# Patient Record
Sex: FEMALE | Race: WHITE | NOT HISPANIC OR LATINO | Employment: OTHER | URBAN - METROPOLITAN AREA
[De-identification: names, ages, dates, MRNs, and addresses within clinical notes are randomized per-mention and may not be internally consistent; named-entity substitution may affect disease eponyms.]

---

## 2017-10-06 ENCOUNTER — HISTORICAL (OUTPATIENT)
Dept: RADIOLOGY | Facility: HOSPITAL | Age: 67
End: 2017-10-06

## 2017-10-27 ENCOUNTER — HISTORICAL (OUTPATIENT)
Dept: RADIOLOGY | Facility: HOSPITAL | Age: 67
End: 2017-10-27

## 2017-11-13 ENCOUNTER — HISTORICAL (OUTPATIENT)
Dept: RADIOLOGY | Facility: HOSPITAL | Age: 67
End: 2017-11-13

## 2017-11-24 ENCOUNTER — HISTORICAL (OUTPATIENT)
Dept: RADIOLOGY | Facility: HOSPITAL | Age: 67
End: 2017-11-24

## 2017-11-24 LAB
ABS NEUT (OLG): 3.5 X10(3)/MCL (ref 1.5–6.9)
ALBUMIN SERPL-MCNC: 4.1 GM/DL (ref 3.4–5)
ALBUMIN/GLOB SERPL: 1.2 RATIO
ALP SERPL-CCNC: 44 UNIT/L (ref 30–113)
ALT SERPL-CCNC: 32 UNIT/L (ref 10–45)
AST SERPL-CCNC: 19 UNIT/L (ref 15–37)
BILIRUB SERPL-MCNC: 0.3 MG/DL (ref 0.1–0.9)
BILIRUBIN DIRECT+TOT PNL SERPL-MCNC: 0.1 MG/DL (ref 0–0.3)
BILIRUBIN DIRECT+TOT PNL SERPL-MCNC: 0.2 MG/DL
BUN SERPL-MCNC: 15 MG/DL (ref 10–20)
CALCIUM SERPL-MCNC: 9.7 MG/DL (ref 8–10.5)
CHLORIDE SERPL-SCNC: 103 MMOL/L (ref 100–108)
CO2 SERPL-SCNC: 29 MMOL/L (ref 21–35)
CREAT SERPL-MCNC: 0.94 MG/DL (ref 0.7–1.3)
ERYTHROCYTE [DISTWIDTH] IN BLOOD BY AUTOMATED COUNT: 13.1 % (ref 11.5–17)
GLOBULIN SER-MCNC: 3.5 GM/DL
GLUCOSE SERPL-MCNC: 183 MG/DL (ref 75–116)
HCT VFR BLD AUTO: 40 % (ref 36–48)
HGB BLD-MCNC: 12.9 GM/DL (ref 12–16)
MCH RBC QN AUTO: 29 PG (ref 27–34)
MCHC RBC AUTO-ENTMCNC: 32 GM/DL (ref 31–36)
MCV RBC AUTO: 90 FL (ref 80–99)
PLATELET # BLD AUTO: 305 X10(3)/MCL (ref 140–400)
PMV BLD AUTO: 10.3 FL (ref 6.8–10)
POTASSIUM SERPL-SCNC: 4.2 MMOL/L (ref 3.6–5.2)
PROT SERPL-MCNC: 7.6 GM/DL (ref 6.4–8.2)
RBC # BLD AUTO: 4.44 X10(6)/MCL (ref 4.2–5.4)
SODIUM SERPL-SCNC: 139 MMOL/L (ref 135–145)
WBC # SPEC AUTO: 7.3 X10(3)/MCL (ref 4.5–11.5)

## 2018-02-15 ENCOUNTER — HISTORICAL (OUTPATIENT)
Dept: RADIOLOGY | Facility: HOSPITAL | Age: 68
End: 2018-02-15

## 2018-10-09 ENCOUNTER — HISTORICAL (OUTPATIENT)
Dept: RADIOLOGY | Facility: HOSPITAL | Age: 68
End: 2018-10-09

## 2018-10-18 ENCOUNTER — HISTORICAL (OUTPATIENT)
Dept: RADIOLOGY | Facility: HOSPITAL | Age: 68
End: 2018-10-18

## 2018-10-24 LAB
ABS NEUT (OLG): 3.77 X10(3)/MCL (ref 2.1–9.2)
BASOPHILS # BLD AUTO: 0.1 X10(3)/MCL (ref 0–0.2)
BASOPHILS NFR BLD AUTO: 1 %
BUN SERPL-MCNC: 31 MG/DL (ref 7–18)
CALCIUM SERPL-MCNC: 10.2 MG/DL (ref 8.5–10.1)
CHLORIDE SERPL-SCNC: 106 MMOL/L (ref 98–107)
CO2 SERPL-SCNC: 26 MMOL/L (ref 21–32)
CREAT SERPL-MCNC: 1.2 MG/DL (ref 0.55–1.02)
CREAT/UREA NIT SERPL: 25.8
EOSINOPHIL # BLD AUTO: 0.3 X10(3)/MCL (ref 0–0.9)
EOSINOPHIL NFR BLD AUTO: 4 %
ERYTHROCYTE [DISTWIDTH] IN BLOOD BY AUTOMATED COUNT: 12.7 % (ref 11.5–17)
GLUCOSE SERPL-MCNC: 121 MG/DL (ref 74–106)
HCT VFR BLD AUTO: 41 % (ref 37–47)
HGB BLD-MCNC: 12.4 GM/DL (ref 12–16)
LYMPHOCYTES # BLD AUTO: 2.3 X10(3)/MCL (ref 0.6–4.6)
LYMPHOCYTES NFR BLD AUTO: 33 %
MCH RBC QN AUTO: 27.9 PG (ref 27–31)
MCHC RBC AUTO-ENTMCNC: 30.2 GM/DL (ref 33–36)
MCV RBC AUTO: 92.3 FL (ref 80–94)
MONOCYTES # BLD AUTO: 0.6 X10(3)/MCL (ref 0.1–1.3)
MONOCYTES NFR BLD AUTO: 8 %
NEUTROPHILS # BLD AUTO: 3.77 X10(3)/MCL (ref 2.1–9.2)
NEUTROPHILS NFR BLD AUTO: 54 %
PLATELET # BLD AUTO: 325 X10(3)/MCL (ref 130–400)
PMV BLD AUTO: 10.3 FL (ref 9.4–12.4)
POTASSIUM SERPL-SCNC: 4.4 MMOL/L (ref 3.5–5.1)
RBC # BLD AUTO: 4.44 X10(6)/MCL (ref 4.2–5.4)
SODIUM SERPL-SCNC: 141 MMOL/L (ref 136–145)
WBC # SPEC AUTO: 7 X10(3)/MCL (ref 4.5–11.5)

## 2018-10-31 ENCOUNTER — HISTORICAL (OUTPATIENT)
Dept: SURGERY | Facility: HOSPITAL | Age: 68
End: 2018-10-31

## 2018-11-20 ENCOUNTER — HISTORICAL (OUTPATIENT)
Dept: SURGERY | Facility: HOSPITAL | Age: 68
End: 2018-11-20

## 2018-11-20 LAB
APTT PPP: 35.7 SECOND(S) (ref 24.8–36.9)
GROUP & RH: NORMAL
INR PPP: 1.1 (ref 0–1.3)
PROTHROMBIN TIME: 15 SECOND(S) (ref 12.2–14.7)

## 2018-12-03 ENCOUNTER — HISTORICAL (OUTPATIENT)
Dept: ADMINISTRATIVE | Facility: HOSPITAL | Age: 68
End: 2018-12-03

## 2019-01-14 ENCOUNTER — HISTORICAL (OUTPATIENT)
Dept: ADMINISTRATIVE | Facility: HOSPITAL | Age: 69
End: 2019-01-14

## 2019-02-06 ENCOUNTER — HISTORICAL (OUTPATIENT)
Dept: RADIOLOGY | Facility: HOSPITAL | Age: 69
End: 2019-02-06

## 2019-02-26 ENCOUNTER — HISTORICAL (OUTPATIENT)
Dept: RADIOLOGY | Facility: HOSPITAL | Age: 69
End: 2019-02-26

## 2019-03-18 ENCOUNTER — HISTORICAL (OUTPATIENT)
Dept: ADMINISTRATIVE | Facility: HOSPITAL | Age: 69
End: 2019-03-18

## 2019-09-24 ENCOUNTER — HISTORICAL (OUTPATIENT)
Dept: RESPIRATORY THERAPY | Facility: HOSPITAL | Age: 69
End: 2019-09-24

## 2019-09-30 ENCOUNTER — HISTORICAL (OUTPATIENT)
Dept: RADIOLOGY | Facility: HOSPITAL | Age: 69
End: 2019-09-30

## 2019-11-20 ENCOUNTER — HISTORICAL (OUTPATIENT)
Dept: INTENSIVE CARE | Facility: HOSPITAL | Age: 69
End: 2019-11-20

## 2020-01-23 ENCOUNTER — HISTORICAL (OUTPATIENT)
Dept: CARDIOLOGY | Facility: HOSPITAL | Age: 70
End: 2020-01-23

## 2020-05-04 ENCOUNTER — HISTORICAL (OUTPATIENT)
Dept: ADMINISTRATIVE | Facility: HOSPITAL | Age: 70
End: 2020-05-04

## 2020-08-03 ENCOUNTER — HISTORICAL (OUTPATIENT)
Dept: ADMINISTRATIVE | Facility: HOSPITAL | Age: 70
End: 2020-08-03

## 2020-10-26 ENCOUNTER — HISTORICAL (OUTPATIENT)
Dept: SURGERY | Facility: HOSPITAL | Age: 70
End: 2020-10-26

## 2020-10-26 LAB
ABS NEUT (OLG): 4.8 X10(3)/MCL (ref 1.5–6.9)
BUN SERPL-MCNC: 21 MG/DL (ref 9.8–20.1)
CALCIUM SERPL-MCNC: 10.4 MG/DL (ref 8.4–10.2)
CHLORIDE SERPL-SCNC: 104 MMOL/L (ref 98–107)
CO2 SERPL-SCNC: 25 MMOL/L (ref 23–31)
CREAT SERPL-MCNC: 0.82 MG/DL (ref 0.55–1.02)
CREAT/UREA NIT SERPL: 26
ERYTHROCYTE [DISTWIDTH] IN BLOOD BY AUTOMATED COUNT: 13.2 % (ref 11.5–17)
GLUCOSE SERPL-MCNC: 176 MG/DL (ref 82–115)
GROUP & RH: NORMAL
HCT VFR BLD AUTO: 44.9 % (ref 36–48)
HGB BLD-MCNC: 14.6 GM/DL (ref 12–16)
MCH RBC QN AUTO: 28 PG (ref 27–34)
MCHC RBC AUTO-ENTMCNC: 32 GM/DL (ref 31–36)
MCV RBC AUTO: 88 FL (ref 80–99)
PLATELET # BLD AUTO: 297 X10(3)/MCL (ref 140–400)
PMV BLD AUTO: 10.9 FL (ref 6.8–10)
POTASSIUM SERPL-SCNC: 4.3 MMOL/L (ref 3.5–5.1)
RBC # BLD AUTO: 5.13 X10(6)/MCL (ref 4.2–5.4)
SODIUM SERPL-SCNC: 141 MMOL/L (ref 136–145)
WBC # SPEC AUTO: 8.1 X10(3)/MCL (ref 4.5–11.5)

## 2020-11-10 LAB
ABS NEUT (OLG): 3.1 X10(3)/MCL (ref 1.5–6.9)
BUN SERPL-MCNC: 14 MG/DL (ref 9.8–20.1)
CALCIUM SERPL-MCNC: 9.4 MG/DL (ref 8.4–10.2)
CHLORIDE SERPL-SCNC: 104 MMOL/L (ref 98–107)
CO2 SERPL-SCNC: 28 MMOL/L (ref 23–31)
CREAT SERPL-MCNC: 0.74 MG/DL (ref 0.55–1.02)
CREAT/UREA NIT SERPL: 19
ERYTHROCYTE [DISTWIDTH] IN BLOOD BY AUTOMATED COUNT: 13.3 % (ref 11.5–17)
GLUCOSE SERPL-MCNC: 186 MG/DL (ref 82–115)
GROUP & RH: NORMAL
HCT VFR BLD AUTO: 41.9 % (ref 36–48)
HGB BLD-MCNC: 13.1 GM/DL (ref 12–16)
MCH RBC QN AUTO: 28 PG (ref 27–34)
MCHC RBC AUTO-ENTMCNC: 31 GM/DL (ref 31–36)
MCV RBC AUTO: 89 FL (ref 80–99)
PLATELET # BLD AUTO: 289 X10(3)/MCL (ref 140–400)
PMV BLD AUTO: 10.4 FL (ref 6.8–10)
POTASSIUM SERPL-SCNC: 4.3 MMOL/L (ref 3.5–5.1)
RBC # BLD AUTO: 4.72 X10(6)/MCL (ref 4.2–5.4)
SODIUM SERPL-SCNC: 140 MMOL/L (ref 136–145)
WBC # SPEC AUTO: 6.2 X10(3)/MCL (ref 4.5–11.5)

## 2020-11-13 ENCOUNTER — HISTORICAL (OUTPATIENT)
Dept: ANESTHESIOLOGY | Facility: HOSPITAL | Age: 70
End: 2020-11-13

## 2021-02-05 ENCOUNTER — HISTORICAL (OUTPATIENT)
Dept: SURGERY | Facility: HOSPITAL | Age: 71
End: 2021-02-05

## 2021-02-05 LAB
ABS NEUT (OLG): 3 X10(3)/MCL (ref 1.5–6.9)
BUN SERPL-MCNC: 15 MG/DL (ref 9.8–20.1)
CALCIUM SERPL-MCNC: 10.3 MG/DL (ref 8.4–10.2)
CHLORIDE SERPL-SCNC: 99 MMOL/L (ref 98–107)
CO2 SERPL-SCNC: 30 MMOL/L (ref 23–31)
CREAT SERPL-MCNC: 0.83 MG/DL (ref 0.55–1.02)
CREAT/UREA NIT SERPL: 18
ERYTHROCYTE [DISTWIDTH] IN BLOOD BY AUTOMATED COUNT: 13.3 % (ref 11.5–17)
GLUCOSE SERPL-MCNC: 209 MG/DL (ref 82–115)
GROUP & RH: NORMAL
HCT VFR BLD AUTO: 44.8 % (ref 36–48)
HGB BLD-MCNC: 14.2 GM/DL (ref 12–16)
MCH RBC QN AUTO: 27 PG (ref 27–34)
MCHC RBC AUTO-ENTMCNC: 32 GM/DL (ref 31–36)
MCV RBC AUTO: 86 FL (ref 80–99)
PLATELET # BLD AUTO: 256 X10(3)/MCL (ref 140–400)
PMV BLD AUTO: 10.7 FL (ref 6.8–10)
POTASSIUM SERPL-SCNC: 3.7 MMOL/L (ref 3.5–5.1)
RBC # BLD AUTO: 5.18 X10(6)/MCL (ref 4.2–5.4)
SARS-COV-2 AG RESP QL IA.RAPID: NOT DETECTED
SODIUM SERPL-SCNC: 139 MMOL/L (ref 136–145)
WBC # SPEC AUTO: 5.5 X10(3)/MCL (ref 4.5–11.5)

## 2021-02-10 ENCOUNTER — HISTORICAL (OUTPATIENT)
Dept: LAB | Facility: HOSPITAL | Age: 71
End: 2021-02-10

## 2021-02-10 LAB
ABS NEUT (OLG): 3.8 X10(3)/MCL (ref 1.5–6.9)
BUN SERPL-MCNC: 19 MG/DL (ref 9.8–20.1)
CALCIUM SERPL-MCNC: 9.4 MG/DL (ref 8.4–10.2)
CHLORIDE SERPL-SCNC: 102 MMOL/L (ref 98–107)
CO2 SERPL-SCNC: 29 MMOL/L (ref 23–31)
CREAT SERPL-MCNC: 0.74 MG/DL (ref 0.55–1.02)
CREAT/UREA NIT SERPL: 26
ERYTHROCYTE [DISTWIDTH] IN BLOOD BY AUTOMATED COUNT: 13.1 % (ref 11.5–17)
GLUCOSE SERPL-MCNC: 173 MG/DL (ref 82–115)
HCT VFR BLD AUTO: 41.4 % (ref 36–48)
HGB BLD-MCNC: 13.7 GM/DL (ref 12–16)
MCH RBC QN AUTO: 28 PG (ref 27–34)
MCHC RBC AUTO-ENTMCNC: 33 GM/DL (ref 31–36)
MCV RBC AUTO: 86 FL (ref 80–99)
PLATELET # BLD AUTO: 302 X10(3)/MCL (ref 140–400)
PMV BLD AUTO: 10.2 FL (ref 6.8–10)
POTASSIUM SERPL-SCNC: 3.7 MMOL/L (ref 3.5–5.1)
RBC # BLD AUTO: 4.81 X10(6)/MCL (ref 4.2–5.4)
SODIUM SERPL-SCNC: 140 MMOL/L (ref 136–145)
WBC # SPEC AUTO: 6.7 X10(3)/MCL (ref 4.5–11.5)

## 2021-03-04 LAB
ABS NEUT (OLG): 5.18 X10(3)/MCL (ref 2.1–9.2)
ALBUMIN SERPL-MCNC: 2.9 GM/DL (ref 3.4–4.8)
ALBUMIN/GLOB SERPL: 1 RATIO (ref 1.1–2)
ALP SERPL-CCNC: 76 UNIT/L (ref 40–150)
ALT SERPL-CCNC: 59 UNIT/L (ref 0–55)
AST SERPL-CCNC: 11 UNIT/L (ref 5–34)
BASOPHILS # BLD AUTO: 0.06 X10(3)/MCL (ref 0–0.2)
BASOPHILS NFR BLD AUTO: 0.7 % (ref 0–1)
BILIRUB SERPL-MCNC: 0.5 MG/DL (ref 0.2–1.2)
BILIRUBIN DIRECT+TOT PNL SERPL-MCNC: 0.2 MG/DL (ref 0–0.8)
BILIRUBIN DIRECT+TOT PNL SERPL-MCNC: 0.3 MG/DL (ref 0–0.5)
BUN SERPL-MCNC: 20.2 MG/DL (ref 9.8–20.1)
CALCIUM SERPL-MCNC: 9.5 MG/DL (ref 8.4–10.2)
CHLORIDE SERPL-SCNC: 102 MMOL/L (ref 98–107)
CHOLEST SERPL-MCNC: 121 MG/DL
CHOLEST/HDLC SERPL: 5 {RATIO} (ref 0–5)
CO2 SERPL-SCNC: 29 MMOL/L (ref 23–31)
CREAT SERPL-MCNC: 0.84 MG/DL (ref 0.57–1.11)
EOSINOPHIL # BLD AUTO: 0.67 X10(3)/MCL (ref 0–0.9)
EOSINOPHIL NFR BLD AUTO: 8 % (ref 0–6.4)
ERYTHROCYTE [DISTWIDTH] IN BLOOD BY AUTOMATED COUNT: 14.6 % (ref 11.5–17)
EST. AVERAGE GLUCOSE BLD GHB EST-MCNC: 154.2 MG/DL
FERRITIN SERPL-MCNC: 88.27 NG/ML (ref 4.63–204)
GLOBULIN SER-MCNC: 2.9 GM/DL (ref 2.4–3.5)
GLUCOSE SERPL-MCNC: 187 MG/DL (ref 82–115)
HBA1C MFR BLD: 7 %
HCT VFR BLD AUTO: 26 % (ref 37–47)
HDLC SERPL-MCNC: 23 MG/DL (ref 40–60)
HGB BLD-MCNC: 7.9 GM/DL (ref 12–16)
IMM GRANULOCYTES # BLD AUTO: 0.09 10*3/UL (ref 0–0.02)
IMM GRANULOCYTES NFR BLD AUTO: 1.1 % (ref 0–0.43)
IRON SATN MFR SERPL: 12 % (ref 20–50)
IRON SERPL-MCNC: 32 UG/DL (ref 50–170)
LDLC SERPL CALC-MCNC: 50 MG/DL (ref 50–140)
LYMPHOCYTES # BLD AUTO: 1.65 X10(3)/MCL (ref 0.6–4.6)
LYMPHOCYTES NFR BLD AUTO: 19.7 % (ref 16–44)
MAGNESIUM SERPL-MCNC: 1.95 MG/DL (ref 1.6–2.6)
MCH RBC QN AUTO: 27.7 PG (ref 27–31)
MCHC RBC AUTO-ENTMCNC: 30.4 GM/DL (ref 33–36)
MCV RBC AUTO: 91.2 FL (ref 80–94)
MONOCYTES # BLD AUTO: 0.74 X10(3)/MCL (ref 0.1–1.3)
MONOCYTES NFR BLD AUTO: 8.8 % (ref 4–12.1)
NEUTROPHILS # BLD AUTO: 5.18 X10(3)/MCL (ref 2.1–9.2)
NEUTROPHILS NFR BLD AUTO: 61.7 % (ref 43–73)
NRBC BLD AUTO-RTO: 0 % (ref 0–0.2)
PHOSPHATE SERPL-MCNC: 4.6 MG/DL (ref 2.3–4.7)
PLATELET # BLD AUTO: 271 X10(3)/MCL (ref 130–400)
PMV BLD AUTO: 10.6 FL (ref 7.4–10.4)
POTASSIUM SERPL-SCNC: 4 MMOL/L (ref 3.5–5.1)
PREALB SERPL-MCNC: 12.4 MG/DL (ref 14–37)
PROT SERPL-MCNC: 5.8 GM/DL (ref 5.8–7.6)
RBC # BLD AUTO: 2.85 X10(6)/MCL (ref 4.2–5.4)
SODIUM SERPL-SCNC: 138 MMOL/L (ref 136–145)
TIBC SERPL-MCNC: 235 UG/DL (ref 70–310)
TIBC SERPL-MCNC: 267 UG/DL (ref 250–450)
TRANSFERRIN SERPL-MCNC: 224 MG/DL (ref 173–360)
TRIGL SERPL-MCNC: 238 MG/DL (ref 0–150)
TSH SERPL-ACNC: 3.55 UIU/ML (ref 0.35–4.94)
VLDLC SERPL CALC-MCNC: 48 MG/DL
WBC # SPEC AUTO: 8.4 X10(3)/MCL (ref 4.5–11.5)

## 2021-03-05 ENCOUNTER — HISTORICAL (OUTPATIENT)
Dept: ADMINISTRATIVE | Facility: HOSPITAL | Age: 71
End: 2021-03-05

## 2021-03-05 LAB
ABS NEUT (OLG): 6.59 X10(3)/MCL (ref 2.1–9.2)
ALBUMIN SERPL-MCNC: 2.9 GM/DL (ref 3.4–4.8)
ALBUMIN/GLOB SERPL: 0.9 RATIO (ref 1.1–2)
ALP SERPL-CCNC: 75 UNIT/L (ref 40–150)
ALT SERPL-CCNC: 44 UNIT/L (ref 0–55)
AST SERPL-CCNC: 27 UNIT/L (ref 5–34)
BASOPHILS # BLD AUTO: 0.08 X10(3)/MCL (ref 0–0.2)
BASOPHILS NFR BLD AUTO: 0.8 % (ref 0–1)
BILIRUB SERPL-MCNC: 0.5 MG/DL (ref 0.2–1.2)
BILIRUBIN DIRECT+TOT PNL SERPL-MCNC: 0.2 MG/DL (ref 0–0.8)
BILIRUBIN DIRECT+TOT PNL SERPL-MCNC: 0.3 MG/DL (ref 0–0.5)
BUN SERPL-MCNC: 19.5 MG/DL (ref 9.8–20.1)
BUN SERPL-MCNC: 19.6 MG/DL (ref 9.8–20.1)
CALCIUM SERPL-MCNC: 9.4 MG/DL (ref 8.4–10.2)
CALCIUM SERPL-MCNC: 9.6 MG/DL (ref 8.4–10.2)
CHLORIDE SERPL-SCNC: 103 MMOL/L (ref 98–107)
CHLORIDE SERPL-SCNC: 103 MMOL/L (ref 98–107)
CO2 SERPL-SCNC: 23 MMOL/L (ref 23–31)
CO2 SERPL-SCNC: 25 MMOL/L (ref 23–31)
CREAT SERPL-MCNC: 0.8 MG/DL (ref 0.57–1.11)
CREAT SERPL-MCNC: 0.86 MG/DL (ref 0.57–1.11)
CREAT/UREA NIT SERPL: 24
EOSINOPHIL # BLD AUTO: 0.71 X10(3)/MCL (ref 0–0.9)
EOSINOPHIL NFR BLD AUTO: 6.8 % (ref 0–6.4)
ERYTHROCYTE [DISTWIDTH] IN BLOOD BY AUTOMATED COUNT: 14.9 % (ref 11.5–17)
GLOBULIN SER-MCNC: 3.4 GM/DL (ref 2.4–3.5)
GLUCOSE SERPL-MCNC: 167 MG/DL (ref 82–115)
GLUCOSE SERPL-MCNC: 169 MG/DL (ref 82–115)
HCO3 UR-SCNC: 26.4 MMOL/L (ref 22–26)
HCT VFR BLD AUTO: 28.7 % (ref 37–47)
HCT VFR BLD AUTO: 28.9 % (ref 37–47)
HGB BLD-MCNC: 8.8 GM/DL (ref 12–16)
HGB BLD-MCNC: 8.8 GM/DL (ref 12–16)
IMM GRANULOCYTES # BLD AUTO: 0.11 10*3/UL (ref 0–0.02)
IMM GRANULOCYTES NFR BLD AUTO: 1.1 % (ref 0–0.43)
LYMPHOCYTES # BLD AUTO: 2.03 X10(3)/MCL (ref 0.6–4.6)
LYMPHOCYTES NFR BLD AUTO: 19.4 % (ref 16–44)
MAGNESIUM SERPL-MCNC: 1.99 MG/DL (ref 1.6–2.6)
MCH RBC QN AUTO: 28.2 PG (ref 27–31)
MCHC RBC AUTO-ENTMCNC: 30.4 GM/DL (ref 33–36)
MCV RBC AUTO: 92.6 FL (ref 80–94)
MONOCYTES # BLD AUTO: 0.95 X10(3)/MCL (ref 0.1–1.3)
MONOCYTES NFR BLD AUTO: 9.1 % (ref 4–12.1)
NEUTROPHILS # BLD AUTO: 6.59 X10(3)/MCL (ref 2.1–9.2)
NEUTROPHILS NFR BLD AUTO: 62.8 % (ref 43–73)
NRBC BLD AUTO-RTO: 0.2 % (ref 0–0.2)
PCO2 BLDA: 35.9 MMHG (ref 35–45)
PH SMN: 7.47 [PH] (ref 7.35–7.45)
PLATELET # BLD AUTO: 317 X10(3)/MCL (ref 130–400)
PMV BLD AUTO: 11.3 FL (ref 7.4–10.4)
PO2 BLDA: 79 MMHG (ref 80–100)
POC ALLENS TEST: NORMAL
POC BE: 3 MMOL/L (ref -2–3)
POC SAMPLESOURCE: NORMAL
POC SATURATED O2: 96 % (ref 96–97)
POC SITE: NORMAL
POC TCO2: 28 MMOL/L (ref 24–29)
POTASSIUM SERPL-SCNC: 4 MMOL/L (ref 3.5–5.1)
POTASSIUM SERPL-SCNC: 4.4 MMOL/L (ref 3.5–5.1)
PROT SERPL-MCNC: 6.3 GM/DL (ref 5.8–7.6)
RBC # BLD AUTO: 3.12 X10(6)/MCL (ref 4.2–5.4)
SODIUM SERPL-SCNC: 137 MMOL/L (ref 136–145)
SODIUM SERPL-SCNC: 138 MMOL/L (ref 136–145)
WBC # SPEC AUTO: 10.5 X10(3)/MCL (ref 4.5–11.5)

## 2021-03-08 LAB
ABS NEUT (OLG): 6.63 X10(3)/MCL (ref 2.1–9.2)
ALBUMIN SERPL-MCNC: 2.9 GM/DL (ref 3.4–4.8)
ALBUMIN/GLOB SERPL: 0.8 RATIO (ref 1.1–2)
ALP SERPL-CCNC: 68 UNIT/L (ref 40–150)
ALT SERPL-CCNC: 26 UNIT/L (ref 0–55)
AST SERPL-CCNC: 11 UNIT/L (ref 5–34)
BASOPHILS # BLD AUTO: 0.08 X10(3)/MCL (ref 0–0.2)
BASOPHILS NFR BLD AUTO: 0.8 % (ref 0–1)
BILIRUB SERPL-MCNC: 0.4 MG/DL (ref 0.2–1.2)
BILIRUBIN DIRECT+TOT PNL SERPL-MCNC: 0.2 MG/DL (ref 0–0.5)
BILIRUBIN DIRECT+TOT PNL SERPL-MCNC: 0.2 MG/DL (ref 0–0.8)
BUN SERPL-MCNC: 19.7 MG/DL (ref 9.8–20.1)
CALCIUM SERPL-MCNC: 10 MG/DL (ref 8.4–10.2)
CHLORIDE SERPL-SCNC: 102 MMOL/L (ref 98–107)
CO2 SERPL-SCNC: 27 MMOL/L (ref 23–31)
CREAT SERPL-MCNC: 0.81 MG/DL (ref 0.57–1.11)
CROSSMATCH INTERPRETATION: NORMAL
EOSINOPHIL # BLD AUTO: 0.43 X10(3)/MCL (ref 0–0.9)
EOSINOPHIL NFR BLD AUTO: 4.4 % (ref 0–6.4)
ERYTHROCYTE [DISTWIDTH] IN BLOOD BY AUTOMATED COUNT: 14.6 % (ref 11.5–17)
GLOBULIN SER-MCNC: 3.5 GM/DL (ref 2.4–3.5)
GLUCOSE SERPL-MCNC: 147 MG/DL (ref 82–115)
GROUP & RH: NORMAL
HCT VFR BLD AUTO: 25.2 % (ref 37–47)
HEMOCCULT SP1 STL QL: NEGATIVE
HGB BLD-MCNC: 7.6 GM/DL (ref 12–16)
IMM GRANULOCYTES # BLD AUTO: 0.1 10*3/UL (ref 0–0.02)
IMM GRANULOCYTES NFR BLD AUTO: 1 % (ref 0–0.43)
LYMPHOCYTES # BLD AUTO: 1.81 X10(3)/MCL (ref 0.6–4.6)
LYMPHOCYTES NFR BLD AUTO: 18.4 % (ref 16–44)
MCH RBC QN AUTO: 27.6 PG (ref 27–31)
MCHC RBC AUTO-ENTMCNC: 30.2 GM/DL (ref 33–36)
MCV RBC AUTO: 91.6 FL (ref 80–94)
MONOCYTES # BLD AUTO: 0.79 X10(3)/MCL (ref 0.1–1.3)
MONOCYTES NFR BLD AUTO: 8 % (ref 4–12.1)
NEUTROPHILS # BLD AUTO: 6.63 X10(3)/MCL (ref 2.1–9.2)
NEUTROPHILS NFR BLD AUTO: 67.4 % (ref 43–73)
NRBC BLD AUTO-RTO: 0 % (ref 0–0.2)
PLATELET # BLD AUTO: 376 X10(3)/MCL (ref 130–400)
PMV BLD AUTO: 10.7 FL (ref 7.4–10.4)
POTASSIUM SERPL-SCNC: 4.6 MMOL/L (ref 3.5–5.1)
PRODUCT READY: NORMAL
PROT SERPL-MCNC: 6.4 GM/DL (ref 5.8–7.6)
RBC # BLD AUTO: 2.75 X10(6)/MCL (ref 4.2–5.4)
SODIUM SERPL-SCNC: 136 MMOL/L (ref 136–145)
TRANSFUSION ORDER: NORMAL
WBC # SPEC AUTO: 9.8 X10(3)/MCL (ref 4.5–11.5)

## 2021-03-09 LAB
BUN SERPL-MCNC: 23 MG/DL (ref 9.8–20.1)
CALCIUM SERPL-MCNC: 10 MG/DL (ref 8.4–10.2)
CHLORIDE SERPL-SCNC: 102 MMOL/L (ref 98–107)
CO2 SERPL-SCNC: 26 MMOL/L (ref 23–31)
CREAT SERPL-MCNC: 0.81 MG/DL (ref 0.57–1.11)
CREAT/UREA NIT SERPL: 28
GLUCOSE SERPL-MCNC: 146 MG/DL (ref 82–115)
HCT VFR BLD AUTO: 33.6 % (ref 37–47)
HGB BLD-MCNC: 10.6 GM/DL (ref 12–16)
MAGNESIUM SERPL-MCNC: 2.71 MG/DL (ref 1.6–2.6)
POTASSIUM SERPL-SCNC: 3.9 MMOL/L (ref 3.5–5.1)
SODIUM SERPL-SCNC: 137 MMOL/L (ref 136–145)

## 2021-03-11 LAB
BUN SERPL-MCNC: 22 MG/DL (ref 9.8–20.1)
CALCIUM SERPL-MCNC: 10 MG/DL (ref 8.4–10.2)
CHLORIDE SERPL-SCNC: 104 MMOL/L (ref 98–107)
CO2 SERPL-SCNC: 28 MMOL/L (ref 23–31)
CREAT SERPL-MCNC: 0.89 MG/DL (ref 0.57–1.11)
CREAT/UREA NIT SERPL: 25
GLUCOSE SERPL-MCNC: 144 MG/DL (ref 82–115)
HCT VFR BLD AUTO: 34.7 % (ref 37–47)
HEMOCCULT SP2 STL QL: POSITIVE
HGB BLD-MCNC: 10.5 GM/DL (ref 12–16)
MAGNESIUM SERPL-MCNC: 2.13 MG/DL (ref 1.6–2.6)
POTASSIUM SERPL-SCNC: 4.4 MMOL/L (ref 3.5–5.1)
SODIUM SERPL-SCNC: 140 MMOL/L (ref 136–145)

## 2021-03-15 LAB
ABS NEUT (OLG): 3.75 X10(3)/MCL (ref 2.1–9.2)
ALBUMIN SERPL-MCNC: 3.3 GM/DL (ref 3.4–4.8)
ALBUMIN/GLOB SERPL: 1 RATIO (ref 1.1–2)
ALP SERPL-CCNC: 77 UNIT/L (ref 40–150)
ALT SERPL-CCNC: 12 UNIT/L (ref 0–55)
AST SERPL-CCNC: 10 UNIT/L (ref 5–34)
BASOPHILS # BLD AUTO: 0.11 X10(3)/MCL (ref 0–0.2)
BASOPHILS NFR BLD AUTO: 1.6 % (ref 0–1)
BILIRUB SERPL-MCNC: 0.3 MG/DL (ref 0.2–1.2)
BILIRUBIN DIRECT+TOT PNL SERPL-MCNC: 0.1 MG/DL (ref 0–0.5)
BILIRUBIN DIRECT+TOT PNL SERPL-MCNC: 0.2 MG/DL (ref 0–0.8)
BUN SERPL-MCNC: 22.4 MG/DL (ref 9.8–20.1)
CALCIUM SERPL-MCNC: 10 MG/DL (ref 8.4–10.2)
CHLORIDE SERPL-SCNC: 107 MMOL/L (ref 98–107)
CO2 SERPL-SCNC: 26 MMOL/L (ref 23–31)
CREAT SERPL-MCNC: 1 MG/DL (ref 0.57–1.11)
EOSINOPHIL # BLD AUTO: 0.45 X10(3)/MCL (ref 0–0.9)
EOSINOPHIL NFR BLD AUTO: 6.7 % (ref 0–6.4)
ERYTHROCYTE [DISTWIDTH] IN BLOOD BY AUTOMATED COUNT: 14.3 % (ref 11.5–17)
GLOBULIN SER-MCNC: 3.3 GM/DL (ref 2.4–3.5)
GLUCOSE SERPL-MCNC: 143 MG/DL (ref 82–115)
HCT VFR BLD AUTO: 35.6 % (ref 37–47)
HGB BLD-MCNC: 10.9 GM/DL (ref 12–16)
IMM GRANULOCYTES # BLD AUTO: 0.02 10*3/UL (ref 0–0.02)
IMM GRANULOCYTES NFR BLD AUTO: 0.3 % (ref 0–0.43)
LYMPHOCYTES # BLD AUTO: 1.81 X10(3)/MCL (ref 0.6–4.6)
LYMPHOCYTES NFR BLD AUTO: 27.1 % (ref 16–44)
MCH RBC QN AUTO: 28.4 PG (ref 27–31)
MCHC RBC AUTO-ENTMCNC: 30.6 GM/DL (ref 33–36)
MCV RBC AUTO: 92.7 FL (ref 80–94)
MONOCYTES # BLD AUTO: 0.53 X10(3)/MCL (ref 0.1–1.3)
MONOCYTES NFR BLD AUTO: 7.9 % (ref 4–12.1)
NEUTROPHILS # BLD AUTO: 3.75 X10(3)/MCL (ref 2.1–9.2)
NEUTROPHILS NFR BLD AUTO: 56.4 % (ref 43–73)
NRBC BLD AUTO-RTO: 0 % (ref 0–0.2)
PLATELET # BLD AUTO: 411 X10(3)/MCL (ref 130–400)
PMV BLD AUTO: 10.3 FL (ref 7.4–10.4)
POTASSIUM SERPL-SCNC: 4.4 MMOL/L (ref 3.5–5.1)
PROT SERPL-MCNC: 6.6 GM/DL (ref 5.8–7.6)
RBC # BLD AUTO: 3.84 X10(6)/MCL (ref 4.2–5.4)
SODIUM SERPL-SCNC: 141 MMOL/L (ref 136–145)
WBC # SPEC AUTO: 6.7 X10(3)/MCL (ref 4.5–11.5)

## 2021-03-17 LAB
BUN SERPL-MCNC: 21.5 MG/DL (ref 9.8–20.1)
CALCIUM SERPL-MCNC: 9.8 MG/DL (ref 8.4–10.2)
CHLORIDE SERPL-SCNC: 103 MMOL/L (ref 98–107)
CO2 SERPL-SCNC: 30 MMOL/L (ref 23–31)
CREAT SERPL-MCNC: 0.85 MG/DL (ref 0.57–1.11)
CREAT/UREA NIT SERPL: 25
GLUCOSE SERPL-MCNC: 136 MG/DL (ref 82–115)
MAGNESIUM SERPL-MCNC: 1.85 MG/DL (ref 1.6–2.6)
POTASSIUM SERPL-SCNC: 4.1 MMOL/L (ref 3.5–5.1)
SODIUM SERPL-SCNC: 139 MMOL/L (ref 136–145)

## 2021-03-23 ENCOUNTER — HOSPITAL ENCOUNTER (OUTPATIENT)
Dept: MEDSURG UNIT | Facility: HOSPITAL | Age: 71
End: 2021-03-25
Attending: FAMILY MEDICINE | Admitting: FAMILY MEDICINE

## 2021-03-23 LAB
ABS NEUT (OLG): 6.5 X10(3)/MCL (ref 1.5–6.9)
ALBUMIN SERPL-MCNC: 3.7 GM/DL (ref 3.4–4.8)
ALBUMIN/GLOB SERPL: 1.1 RATIO (ref 1.1–2)
ALP SERPL-CCNC: 82 UNIT/L (ref 40–150)
ALT SERPL-CCNC: 10 UNIT/L (ref 0–55)
APTT PPP: 44.9 SEC (ref 23.4–34.9)
AST SERPL-CCNC: 10 UNIT/L (ref 5–34)
BASOPHILS # BLD AUTO: 0.1 X10(3)/MCL (ref 0–0.1)
BASOPHILS NFR BLD AUTO: 1 % (ref 0–1)
BILIRUB SERPL-MCNC: 0.6 MG/DL
BILIRUBIN DIRECT+TOT PNL SERPL-MCNC: 0.3 MG/DL (ref 0–0.5)
BILIRUBIN DIRECT+TOT PNL SERPL-MCNC: 0.3 MG/DL (ref 0–0.8)
BUN SERPL-MCNC: 12 MG/DL (ref 9.8–20.1)
CALCIUM SERPL-MCNC: 10.1 MG/DL (ref 8.4–10.2)
CHLORIDE SERPL-SCNC: 106 MMOL/L (ref 98–107)
CO2 SERPL-SCNC: 24 MMOL/L (ref 23–31)
CREAT SERPL-MCNC: 0.78 MG/DL (ref 0.55–1.02)
EOSINOPHIL # BLD AUTO: 0.1 X10(3)/MCL (ref 0–0.6)
EOSINOPHIL NFR BLD AUTO: 2 % (ref 0–5)
ERYTHROCYTE [DISTWIDTH] IN BLOOD BY AUTOMATED COUNT: 14.3 % (ref 11.5–17)
GLOBULIN SER-MCNC: 3.3 GM/DL (ref 2.4–3.5)
GLUCOSE SERPL-MCNC: 137 MG/DL (ref 82–115)
HCT VFR BLD AUTO: 35.1 % (ref 36–48)
HGB BLD-MCNC: 11 GM/DL (ref 12–16)
IMM GRANULOCYTES # BLD AUTO: 0.03 10*3/UL (ref 0–0.02)
IMM GRANULOCYTES NFR BLD AUTO: 0.3 % (ref 0–0.43)
INR PPP: 1.3 (ref 2–3)
LYMPHOCYTES # BLD AUTO: 1.6 X10(3)/MCL (ref 0.5–4.1)
LYMPHOCYTES NFR BLD AUTO: 18 % (ref 15–40)
MAGNESIUM SERPL-MCNC: 1.8 MG/DL (ref 1.6–2.6)
MCH RBC QN AUTO: 29 PG (ref 27–34)
MCHC RBC AUTO-ENTMCNC: 31 GM/DL (ref 31–36)
MCV RBC AUTO: 91 FL (ref 80–99)
MONOCYTES # BLD AUTO: 0.8 X10(3)/MCL (ref 0–1.1)
MONOCYTES NFR BLD AUTO: 9 % (ref 4–12)
NEUTROPHILS # BLD AUTO: 6.5 X10(3)/MCL (ref 1.5–6.9)
NEUTROPHILS NFR BLD AUTO: 71 % (ref 43–75)
PHOSPHATE SERPL-MCNC: 2.5 MG/DL (ref 2.3–4.7)
PLATELET # BLD AUTO: 401 X10(3)/MCL (ref 140–400)
PMV BLD AUTO: 10.2 FL (ref 6.8–10)
POTASSIUM SERPL-SCNC: 3.9 MMOL/L (ref 3.5–5.1)
PROT SERPL-MCNC: 7 GM/DL (ref 5.8–7.6)
PROTHROMBIN TIME: 15.9 SEC (ref 11.7–14.5)
RBC # BLD AUTO: 3.85 X10(6)/MCL (ref 4.2–5.4)
SODIUM SERPL-SCNC: 139 MMOL/L (ref 136–145)
WBC # SPEC AUTO: 9.2 X10(3)/MCL (ref 4.5–11.5)

## 2021-03-24 LAB
APPEARANCE, UA: ABNORMAL
BACTERIA SPEC CULT: ABNORMAL /HPF
BILIRUB UR QL STRIP: NEGATIVE
COLOR UR: YELLOW
GLUCOSE (UA): NEGATIVE MG/DL
HGB UR QL STRIP: ABNORMAL
KETONES UR QL STRIP: NEGATIVE MG/DL
LEUKOCYTE ESTERASE UR QL STRIP: ABNORMAL
NITRITE UR QL STRIP: NEGATIVE
PH UR STRIP: 7 [PH] (ref 4.6–8)
PROT UR QL STRIP: 100 MG/DL
RBC #/AREA URNS HPF: ABNORMAL /HPF
SP GR UR STRIP: 1.02 (ref 1–1.03)
SQUAMOUS EPITHELIAL, UA: ABNORMAL /LPF
UROBILINOGEN UR STRIP-ACNC: 2 EU/DL
WBC #/AREA URNS HPF: ABNORMAL /HPF

## 2021-03-25 LAB
ABS NEUT (OLG): 3.4 X10(3)/MCL (ref 1.5–6.9)
ALBUMIN SERPL-MCNC: 3 GM/DL (ref 3.4–4.8)
ALBUMIN/GLOB SERPL: 1 RATIO (ref 1.1–2)
ALP SERPL-CCNC: 65 UNIT/L (ref 40–150)
ALT SERPL-CCNC: 14 UNIT/L (ref 0–55)
APPEARANCE, UA: ABNORMAL
AST SERPL-CCNC: 17 UNIT/L (ref 5–34)
BACTERIA SPEC CULT: ABNORMAL /HPF
BASOPHILS # BLD AUTO: 0 X10(3)/MCL (ref 0–0.1)
BASOPHILS NFR BLD AUTO: 0 % (ref 0–1)
BILIRUB SERPL-MCNC: 0.3 MG/DL
BILIRUB UR QL STRIP: NEGATIVE
BILIRUBIN DIRECT+TOT PNL SERPL-MCNC: 0.1 MG/DL (ref 0–0.8)
BILIRUBIN DIRECT+TOT PNL SERPL-MCNC: 0.2 MG/DL (ref 0–0.5)
BUN SERPL-MCNC: 13 MG/DL (ref 9.8–20.1)
CALCIUM SERPL-MCNC: 9.6 MG/DL (ref 8.4–10.2)
CHLORIDE SERPL-SCNC: 110 MMOL/L (ref 98–107)
CO2 SERPL-SCNC: 26 MMOL/L (ref 23–31)
COLOR UR: YELLOW
CREAT SERPL-MCNC: 0.88 MG/DL (ref 0.55–1.02)
EOSINOPHIL # BLD AUTO: 0.4 X10(3)/MCL (ref 0–0.6)
EOSINOPHIL NFR BLD AUTO: 7 % (ref 0–5)
ERYTHROCYTE [DISTWIDTH] IN BLOOD BY AUTOMATED COUNT: 14.4 % (ref 11.5–17)
FINAL CULTURE: NORMAL
GLOBULIN SER-MCNC: 2.9 GM/DL (ref 2.4–3.5)
GLUCOSE (UA): NEGATIVE MG/DL
GLUCOSE SERPL-MCNC: 160 MG/DL (ref 82–115)
HCT VFR BLD AUTO: 30.6 % (ref 36–48)
HGB BLD-MCNC: 9.7 GM/DL (ref 12–16)
HGB UR QL STRIP: ABNORMAL
IMM GRANULOCYTES # BLD AUTO: 0.01 10*3/UL (ref 0–0.02)
IMM GRANULOCYTES NFR BLD AUTO: 0.2 % (ref 0–0.43)
KETONES UR QL STRIP: NEGATIVE MG/DL
LEUKOCYTE ESTERASE UR QL STRIP: ABNORMAL
LYMPHOCYTES # BLD AUTO: 1.4 X10(3)/MCL (ref 0.5–4.1)
LYMPHOCYTES NFR BLD AUTO: 24 % (ref 15–40)
MAGNESIUM SERPL-MCNC: 1.8 MG/DL (ref 1.6–2.6)
MCH RBC QN AUTO: 29 PG (ref 27–34)
MCHC RBC AUTO-ENTMCNC: 32 GM/DL (ref 31–36)
MCV RBC AUTO: 92 FL (ref 80–99)
MONOCYTES # BLD AUTO: 0.6 X10(3)/MCL (ref 0–1.1)
MONOCYTES NFR BLD AUTO: 11 % (ref 4–12)
NEUTROPHILS # BLD AUTO: 3.4 X10(3)/MCL (ref 1.5–6.9)
NEUTROPHILS NFR BLD AUTO: 58 % (ref 43–75)
NITRITE UR QL STRIP: NEGATIVE
PH UR STRIP: 6 [PH] (ref 4.6–8)
PHOSPHATE SERPL-MCNC: 3.3 MG/DL (ref 2.3–4.7)
PLATELET # BLD AUTO: 304 X10(3)/MCL (ref 140–400)
PMV BLD AUTO: 10.3 FL (ref 6.8–10)
POTASSIUM SERPL-SCNC: 4.8 MMOL/L (ref 3.5–5.1)
PROT SERPL-MCNC: 5.9 GM/DL (ref 5.8–7.6)
PROT UR QL STRIP: NEGATIVE MG/DL
RBC # BLD AUTO: 3.32 X10(6)/MCL (ref 4.2–5.4)
RBC #/AREA URNS HPF: ABNORMAL /HPF
SODIUM SERPL-SCNC: 140 MMOL/L (ref 136–145)
SP GR UR STRIP: 1.02 (ref 1–1.03)
SQUAMOUS EPITHELIAL, UA: ABNORMAL /LPF
UROBILINOGEN UR STRIP-ACNC: 0.2 EU/DL
WBC # SPEC AUTO: 6 X10(3)/MCL (ref 4.5–11.5)
WBC #/AREA URNS HPF: ABNORMAL /HPF

## 2021-03-28 LAB — FINAL CULTURE: NORMAL

## 2021-04-13 ENCOUNTER — HISTORICAL (OUTPATIENT)
Dept: RADIOLOGY | Facility: HOSPITAL | Age: 71
End: 2021-04-13

## 2021-06-10 LAB
ABS NEUT (OLG): 4.9 X10(3)/MCL (ref 1.5–6.9)
BUN SERPL-MCNC: 25 MG/DL (ref 9.8–20.1)
CALCIUM SERPL-MCNC: 10.8 MG/DL (ref 8.4–10.2)
CHLORIDE SERPL-SCNC: 105 MMOL/L (ref 98–107)
CO2 SERPL-SCNC: 30 MMOL/L (ref 23–31)
CREAT SERPL-MCNC: 0.86 MG/DL (ref 0.55–1.02)
CREAT/UREA NIT SERPL: 29
ERYTHROCYTE [DISTWIDTH] IN BLOOD BY AUTOMATED COUNT: 14.2 % (ref 11.5–17)
GLUCOSE SERPL-MCNC: 143 MG/DL (ref 82–115)
GROUP & RH: NORMAL
HCT VFR BLD AUTO: 39.7 % (ref 36–48)
HGB BLD-MCNC: 12.4 GM/DL (ref 12–16)
MCH RBC QN AUTO: 28 PG (ref 27–34)
MCHC RBC AUTO-ENTMCNC: 31 GM/DL (ref 31–36)
MCV RBC AUTO: 90 FL (ref 80–99)
PLATELET # BLD AUTO: 341 X10(3)/MCL (ref 140–400)
PMV BLD AUTO: 10.9 FL (ref 6.8–10)
POTASSIUM SERPL-SCNC: 4.4 MMOL/L (ref 3.5–5.1)
RBC # BLD AUTO: 4.39 X10(6)/MCL (ref 4.2–5.4)
SARS-COV-2 AG RESP QL IA.RAPID: NOT DETECTED
SODIUM SERPL-SCNC: 143 MMOL/L (ref 136–145)
WBC # SPEC AUTO: 7.9 X10(3)/MCL (ref 4.5–11.5)

## 2021-06-15 ENCOUNTER — HISTORICAL (OUTPATIENT)
Dept: ANESTHESIOLOGY | Facility: HOSPITAL | Age: 71
End: 2021-06-15

## 2021-09-27 ENCOUNTER — HISTORICAL (OUTPATIENT)
Dept: SURGERY | Facility: HOSPITAL | Age: 71
End: 2021-09-27

## 2021-09-27 LAB
ABS NEUT (OLG): 4.6 X10(3)/MCL (ref 1.5–6.9)
BUN SERPL-MCNC: 17 MG/DL (ref 9.8–20.1)
CALCIUM SERPL-MCNC: 10.2 MG/DL (ref 8.4–10.2)
CHLORIDE SERPL-SCNC: 105 MMOL/L (ref 98–107)
CO2 SERPL-SCNC: 27 MMOL/L (ref 23–31)
CREAT SERPL-MCNC: 0.95 MG/DL (ref 0.55–1.02)
CREAT/UREA NIT SERPL: 18
ERYTHROCYTE [DISTWIDTH] IN BLOOD BY AUTOMATED COUNT: 13.7 % (ref 11.5–17)
GLUCOSE SERPL-MCNC: 128 MG/DL (ref 82–115)
GROUP & RH: NORMAL
HCT VFR BLD AUTO: 37.2 % (ref 36–48)
HGB BLD-MCNC: 11.6 GM/DL (ref 12–16)
MCH RBC QN AUTO: 28 PG (ref 27–34)
MCHC RBC AUTO-ENTMCNC: 31 GM/DL (ref 31–36)
MCV RBC AUTO: 89 FL (ref 80–99)
PLATELET # BLD AUTO: 369 X10(3)/MCL (ref 140–400)
PMV BLD AUTO: 11.2 FL (ref 6.8–10)
POTASSIUM SERPL-SCNC: 4.7 MMOL/L (ref 3.5–5.1)
RBC # BLD AUTO: 4.17 X10(6)/MCL (ref 4.2–5.4)
SARS-COV-2 AG RESP QL IA.RAPID: NOT DETECTED
SODIUM SERPL-SCNC: 143 MMOL/L (ref 136–145)
WBC # SPEC AUTO: 7.5 X10(3)/MCL (ref 4.5–11.5)

## 2021-09-30 ENCOUNTER — HISTORICAL (OUTPATIENT)
Dept: ANESTHESIOLOGY | Facility: HOSPITAL | Age: 71
End: 2021-09-30

## 2021-10-12 ENCOUNTER — HISTORICAL (OUTPATIENT)
Dept: SURGERY | Facility: HOSPITAL | Age: 71
End: 2021-10-12

## 2021-10-12 LAB
GROUP & RH: NORMAL
SARS-COV-2 AG RESP QL IA.RAPID: NOT DETECTED

## 2021-10-13 ENCOUNTER — HISTORICAL (OUTPATIENT)
Dept: ANESTHESIOLOGY | Facility: HOSPITAL | Age: 71
End: 2021-10-13

## 2021-10-13 LAB
ABS NEUT (OLG): 3.2 X10(3)/MCL (ref 1.5–6.9)
BASOPHILS # BLD AUTO: 0.1 X10(3)/MCL (ref 0–0.1)
BASOPHILS NFR BLD AUTO: 2 % (ref 0–1)
EOSINOPHIL # BLD AUTO: 0.5 X10(3)/MCL (ref 0–0.6)
EOSINOPHIL NFR BLD AUTO: 7 % (ref 0–5)
ERYTHROCYTE [DISTWIDTH] IN BLOOD BY AUTOMATED COUNT: 13.2 % (ref 11.5–17)
HCT VFR BLD AUTO: 38.3 % (ref 36–48)
HGB BLD-MCNC: 11.7 GM/DL (ref 12–16)
IMM GRANULOCYTES # BLD AUTO: 0.02 10*3/UL (ref 0–0.02)
IMM GRANULOCYTES NFR BLD AUTO: 0.3 % (ref 0–0.43)
LYMPHOCYTES # BLD AUTO: 2.5 X10(3)/MCL (ref 0.5–4.1)
LYMPHOCYTES NFR BLD AUTO: 36 % (ref 15–40)
MCH RBC QN AUTO: 27 PG (ref 27–34)
MCHC RBC AUTO-ENTMCNC: 30 GM/DL (ref 31–36)
MCV RBC AUTO: 88 FL (ref 80–99)
MONOCYTES # BLD AUTO: 0.6 X10(3)/MCL (ref 0–1.1)
MONOCYTES NFR BLD AUTO: 9 % (ref 4–12)
NEUTROPHILS # BLD AUTO: 3.2 X10(3)/MCL (ref 1.5–6.9)
NEUTROPHILS NFR BLD AUTO: 46 % (ref 43–75)
PLATELET # BLD AUTO: 327 X10(3)/MCL (ref 140–400)
PMV BLD AUTO: 10.8 FL (ref 6.8–10)
RBC # BLD AUTO: 4.33 X10(6)/MCL (ref 4.2–5.4)
WBC # SPEC AUTO: 6.9 X10(3)/MCL (ref 4.5–11.5)

## 2021-12-13 ENCOUNTER — HISTORICAL (OUTPATIENT)
Dept: RADIOLOGY | Facility: HOSPITAL | Age: 71
End: 2021-12-13

## 2022-04-05 ENCOUNTER — HISTORICAL (OUTPATIENT)
Dept: SURGERY | Facility: HOSPITAL | Age: 72
End: 2022-04-05

## 2022-04-05 LAB
ABS NEUT (OLG): 5.1 (ref 1.5–6.9)
BUN SERPL-MCNC: 14 MG/DL (ref 9.8–20.1)
CALCIUM SERPL-MCNC: 11 MG/DL (ref 8.7–10.5)
CHLORIDE SERPL-SCNC: 100 MMOL/L (ref 98–107)
CO2 SERPL-SCNC: 28 MMOL/L (ref 23–31)
CREAT SERPL-MCNC: 1.34 MG/DL (ref 0.55–1.02)
CREAT/UREA NIT SERPL: 10
ERYTHROCYTE [DISTWIDTH] IN BLOOD BY AUTOMATED COUNT: 14.1 % (ref 11.5–17)
GLUCOSE SERPL-MCNC: 133 MG/DL (ref 82–115)
HCT VFR BLD AUTO: 34.2 % (ref 36–48)
HEMOLYSIS INTERF INDEX SERPL-ACNC: 1
HGB BLD-MCNC: 10.6 G/DL (ref 12–16)
ICTERIC INTERF INDEX SERPL-ACNC: 0
LIPEMIC INTERF INDEX SERPL-ACNC: <0
MCH RBC QN AUTO: 27 PG (ref 27–34)
MCHC RBC AUTO-ENTMCNC: 31 G/DL (ref 31–36)
MCV RBC AUTO: 86 FL (ref 80–99)
PLATELET # BLD AUTO: 437 10*3/UL (ref 140–400)
PMV BLD AUTO: 10.1 FL (ref 6.8–10)
POTASSIUM SERPL-SCNC: 4.4 MMOL/L (ref 3.5–5.1)
RBC # BLD AUTO: 3.99 10*6/UL (ref 4.2–5.4)
SODIUM SERPL-SCNC: 140 MMOL/L (ref 136–145)
WBC # SPEC AUTO: 7 10*3/UL (ref 4.5–11.5)

## 2022-04-07 ENCOUNTER — HISTORICAL (OUTPATIENT)
Dept: ANESTHESIOLOGY | Facility: HOSPITAL | Age: 72
End: 2022-04-07

## 2022-04-07 LAB — CBG: 115 (ref 70–115)

## 2022-04-11 ENCOUNTER — HISTORICAL (OUTPATIENT)
Dept: ADMINISTRATIVE | Facility: HOSPITAL | Age: 72
End: 2022-04-11
Payer: MEDICARE

## 2022-04-28 VITALS
SYSTOLIC BLOOD PRESSURE: 144 MMHG | DIASTOLIC BLOOD PRESSURE: 77 MMHG | WEIGHT: 189.63 LBS | HEIGHT: 62 IN | BODY MASS INDEX: 34.89 KG/M2

## 2022-04-29 NOTE — OP NOTE
DATE OF SURGERY:    11/20/2018    SURGEON:  Anish Calderon Jr, MD  ASSISTANT:  None    PREOP DIAGNOSIS:  Left humeral shaft fracture.    POSTOP DIAGNOSIS:  Left humeral shaft fracture.    PROCEDURE:  Open reduction and internal fixation left humeral shaft.    ANESTHESIA:  General plus regional.    COMPLICATIONS:  None.    IMPLANTS:  Synthes.    HISTORY OF PRESENT ILLNESS:  Thu is a very pleasant, 68-year-old female, who fell on her left side and sustained a displaced proximal humeral shaft fracture.  I recommended open reduction and internal fixation.  I discussed with her the risks, benefits and alternatives to therapy and she elected to proceed.    PROCEDURE IN DETAIL:  Thu was initially seen in preoperative unit where History and Physical were reviewed without change.  Her left arm was marked.  Her consents were reviewed.  All questions were answered.  She was taken to the operating room and placed supine on the operating table where general anesthesia was induced.  Her left upper extremity was prepped and draped in a sterile fashion.  An attending led timeout confirmed the operative site.  Preoperative antibiotics were administered.  I then began the procedure.     I started with standard deltopectoral approach, sharply incised through skin and subcutaneous tissue, identified the cephalic vein and protected it throughout the procedure.  I came down through the deltopectoral interval and stayed lateral to the biceps tendon down to the bone.  She had basically 1 oblique fracture in the proximal 3rd of the shaft.  She had posterior butterfly piece which was displaced.  Using point reduction clamps, I was able to achieve an anatomic reduction and placed two 3.5 mm lag screws using a lag technique to secure the fracture.  I then placed a Synthes 3.5 mm LCP periarticular proximal humerus plate and secured it initially with pins and confirmed both reduction of the fracture and placement of the plate  under fluoroscopy.  I then placed 5 screws proximal in a locking fashion and 4 distal in cortical fashion to secure the fracture in order to place the plate in a neutralization type fashion.  Happy with this, I     took my final fluoroscopic pictures.  I copiously irrigated the wound.  I closed the incision in layers.  She was awoken from anesthesia and transferred to postop unit in good condition.        ______________________________  MD RIKI Rivera Jr/INDER  DD:  11/20/2018  Time:  03:58PM  DT:  11/20/2018  Time:  04:25PM  Job #:  [id number]

## 2022-04-29 NOTE — OP NOTE
Patient:   Thu Branch            MRN: [id number]            FIN: [id number]               Age:   70 years     Sex:  Female     :  1950   Associated Diagnoses:   None   Author:   Sylwia Benson MD      Operative Note   Operative Information   Date/ Time:  2020 07:12:00.     Procedures Performed: Botox of the Bladder with cystourethroscopy.     Preoperative Diagnosis: Urge urinary incontinence.     Postoperative Diagnosis: same.     Surgeon: Sylwia Benson MD.     Anesthesia: IV sedation.     Speciman Removed: none.     Description of Procedure/Findings/    Complications: 70-year-old female with refractory urinary urge incontinence desiring 3rd line therapy.     Appropriate consents were obtained obtained the patient was taken operating room and placed in the lithotomy position.  She was prepped and draped in usual sterile fashion at this time cystourethroscopy was performed without difficulty with the findings as previously stated.  The Abhinav surgical was then brought in through the accessory port and right centimeter was in visualization the needle was placed to #2 at this time Botox was injected in a gridlike fashion above the trigone throughout the dome of the bladder.  I used 100 units of Botox was dissolved in 10 mL of normal saline ; 20 injections of 25 mL were made throughout the dome of the bladder above the trigone ; once this was completed I did flush the Botox with 2 mL of normal saline) was withdrawn and the introducer was withdrawn out of the port the bladder was inspected ureteral reflux bilaterally right Botox was given.  Cystourethroscopy was completed the patient was then taken to the recovery room in stable condition. .     Esimated blood loss: loss less than  25  cc.     Findings: trabeculations throughout the bladder; normal ureteral orifices with bilateral efflux; normal urethral canal.     Complications: None.

## 2022-04-29 NOTE — OP NOTE
"   Patient:   Thu Branch            MRN: [id number]            FIN: [id number]               Age:   71 years     Sex:  Female     :  1950   Associated Diagnoses:   None   Author:   Sylwia Benson MD      Operative Note   Operative Information   Date/ Time:  2021 08:13:00.     Procedures Performed: Placement of the sacral neuromodulator using the InterStim 2.     Indications: Extremity 11-year-old female with refractory urinary urge incontinence has been on multiple antispasmodics as well as Botox for the bladder.     Preoperative Diagnosis: Urinary urge incontinence.     Postoperative Diagnosis: Same.     Surgeon: Sylwia Benson MD.     Anesthesia: Gen. anesthesia and local infiltration of 1% lidocaine.     Description of Procedure/Findings/    Complications: Patient was properly identified and placed in prone position per OR protocol.  MAC anesthesia was administered.  Pillows were placed under lower abdomen to flatten sacrum and under shins to allow the toes to dangle freely.  Patient was prepped and draped in usual sterile fashion using Hibiclens prep solution.  The C-arm was draped and moved into AP position to provide fluoroscopic mapping of the sacral region which included marking out midline of the sacrum, SI joints, sciatic notches, medial foramenal borders and sacral foramena.  The C-arm was moved to lateral position to image the area from sacral promontory to the coccyx.  Local injection of lidocaine with epinephrine was administered. A 5 inch 18-gauge size Foramen Needle was introduced approximately 2 cm above the sciatic notch and 2 cm lateral to sacral midline, feeling for foramenal margins until the S3 foramen was identified and penetrated.  The depth of the Foramen Needle was confirmed and adjusted fluoroscopically.  Proper needle position was confirmed by patient identification of the location of sensation, direct observation of the lifting of the perineum or "bellowing," " "and observation of plantar flexion of the great toe utilizing the external Test Stimulator.  The Foramen Needle stylet was removed and a directional guide was placed and confirmed fluoroscopically.  The Foramen Needle was removed.  An incision was made peripherally to the directional guide through the fascial layer.  The Lead Introducer sheath with dilator was placed over the directional guide and directed into the foramen to ensure the radiopaque marker of the Lead Introducer did not extend beyond the anterior edge of the sacrum.  The dilator was unlocked and removed along with the directional guide.  The lead was then placed through the introducer sheath to the first white line.  Position was checked fluoroscopically.  The lead was then further introduced until 3 electrodes were visible below the sacrum.  Each electrode was tested for location of patient sensation, visualization of "vinny," and plantar flexion of the great toe.  After satisfactory positioning was confirmed, the introducer sheath was retracted under continuous fluoroscopy, deploying lead tines into the perisacral tissue.  Further incision was made into subcutaneous tissue posterior to the iliac crest and lateral to the sacrum.  Blunt dissection was continued until the gluteal fascia was identified and hemostasis was achieved allowing for a sufficient pocket for the neurostimulator.  A Tunneling Tool with straw was placed from the lead exit site subcutaneously to the incised pocket site.  The tunneling tool was removed and the lead was fed through the straw and pulled out at the pocket site.  The lead was cleansed of bodily fluids and dried.   A protective boot was placed over the lead. The lead was inserted into the temporary Percutaneous Extension and the metal bands were aligned. The 4 setscrews were tightened with the hex wrench. The boot was pushed over the connection and silk ties were sutured to the boot grooves on either side of the " connection. A tunnel was made subcutaneously and exited to a puncture site above the contra lateral buttock. The Percutaneous Extension was placed through the straw and exposed and connected to the Twist Lock gray cable. The wounds were irrigated with antibiotic solution in water and closed with 3-0 Vicryl subcuticular sutures and 3-0 Vicryl skin sutures.  Counts were correct. Steri strips and gauze 4x4s were placed over incisions. Tegaderm was placed to cover incisions. Gauze was placed under Twist Lock cable connector.  The patient was transferred to recovery room in satisfactory condition.  Using the external Test Stimulator, the patient was programmed to the electrode of optimum sensation, and given instructions on utilizing the external Test Stimulator prior to discharge. A diary will be kept until return appointment to my office to discuss results of this test stimulation.  .     Esimated blood loss: loss less than  25  cc.     Complications: None.

## 2022-04-29 NOTE — DISCHARGE SUMMARY
Patient:   Thu Branch            MRN: [id number]            FIN: [id number]               Age:   68 years     Sex:  Female     :  1950   Associated Diagnoses:   Fracture of humerus   Author:   Kvng GUARDADO MD, Anish RASMUSSEN      Discharge Information   Discharge Summary Information:  Admitted  2018, Discharged  2018, Admitting diagnosis.         Admitting physician: Anish Calderon JR., MD.         Discharge diagnosis: Fracture of humerus ([id number] S42.309A).       Physical Examination   Vital Signs   2018 5:22 CST      Peripheral Pulse Rate     77 bpm                             Respiratory Rate          18 br/min                             SpO2                      96 %                             Oxygen Therapy            Room air                             Systolic Blood Pressure   126 mmHg                             Diastolic Blood Pressure  68 mmHg                             Mean Arterial Pressure, Cuff              87 mmHg    2018 1:10 CST      24 HR Intake Totals       1,316.14 mL                             24 HR Output Totals       0 mL                             24 HR I&O Balance         1,316.14 mL    2018 0:11 CST      Peripheral Pulse Rate     90 bpm                             Respiratory Rate          18 br/min                             SpO2                      98 %                             Oxygen Therapy            Room air                             Systolic Blood Pressure   128 mmHg                             Diastolic Blood Pressure  70 mmHg                             Mean Arterial Pressure, Cuff              89 mmHg    2018 21:06 CST     Peripheral Pulse Rate     77 bpm                             Respiratory Rate          18 br/min                             SpO2                      97 %                             Oxygen Therapy            Room air                             Systolic Blood Pressure   91 mmHg                              Diastolic Blood Pressure  63 mmHg                             Mean Arterial Pressure, Cuff              72 mmHg    11/20/2018 20:34 CST     Temperature Oral          36.4 DegC                             Temperature Oral (calculated)             97.52 DegF                             Peripheral Pulse Rate     78 bpm                             Respiratory Rate          16 br/min                             SpO2                      96 %                             Oxygen Therapy            Room air                             Systolic Blood Pressure   86 mmHg  LOW                             Diastolic Blood Pressure  61 mmHg                             Mean Arterial Pressure, Cuff              69 mmHg     Dressing c/d/i. +AIN, PIN, R, U. Sensation decreased globally. +Rad      Hospital Course   Hospital Course   Admitted from: from home.     Transferred via: by car.     Admitting diagnosis: Fracture of humerus ([id number] S42.309A).     Admission disposition: admit to surgery.     Length of stay: days  1.        Discharge Plan   Discharge Summary Plan   Discharge Status: improved.     Discharge instructions given: to patient.     Discharge disposition: discharge to home into the care of family member.     Prescriptions: reviewed with patient, called to pharmacy.

## 2022-04-29 NOTE — OP NOTE
Patient:   Thu Branch            MRN: [id number]            FIN: [id number]               Age:   68 years     Sex:  Female     :  1950   Associated Diagnoses:   None   Author:   Abdi Maguire MD      OPERATIVE REPORT    DATE: 10/31/2018    SURGEON: Abdi Maguire MD    ASSISTANT: Yareli Nathan    PREOPERATIVE DIAGNOSIS:   1.  Left shoulder greater tuberosity fracture   2.  Acromioclavicular arthrosis    POSTOPERATIVE DIAGNOSIS:  1.  Left shoulder greater tuberosity fracture   2.  Acromioclavicular arthrosis  3.  Degenerative SLAP tear    PROCEDURE:  1.  Left shoulder diagnostic arthroscopy  2.  Arthroscopic biceps tenotomy  3.  Distal clavicle resection  4.  Arthroscopic greater tuberosity repair (technique similar to arthroscopic rotator cuff repair)    TYPE OF ANESTHESIA:    General anesthesia with supraventricular nerve block    BLOOD LOSS:  Less than 40 cc    DVT PROPHYLAXIS:  This patient be on aspirin postoperatively    INSTRUMENTATION:  Arthrex 4.75 mm anchor screws for the Medial Row, Arthrex 5.5 mm anchor for lateral row    HISTORY AND INDICATIONS:  Refer to last orthopedic office visit note    PROCEDURE IN DETAIL:     Diagnostic arthroscopy of shoulder    The examination under anesthesia was performed for skin defects and other contraindications and none were found.The patient was carefully placed in a lateral decubitus position. All bony prominences were padded and sterile U-drape was applied. Patients operative extremity was placed in suspension device with 7-10lbs of weight applied. The skin was prepped in normal sterile fashion. A time out was performed to confirm correct operative extremity, allergies, and antibiotic infusion. All portals were made with an 11-blade and an 18-gauge spinal needle was used to help probe and find proper alignment for the portals. After creating posterior portal, an arthroscope was inserted into the glenohumeral joint. A diagnostic arthroscopy was then  performed in this sequential order: biceps anchor, rotator interval, subscapularis tendon, superior glenohumeral ligament, middle glenohumeral ligament, inferior glenohumeral ligament, anterior and inferior capsular attachments, anterior, inferior, and posterior labrum, teres minor tendon, infraspinatus tendon, and supraspinatus tendon, and biceps tendon in the rotator interval.    The certified assistant provided critical assistance by holding instruments including the arthroscope to provide adequate visualization of the procedure, as well as assisting in wound closure.    At the end of the procedure the portals were closed with mastisol around the wound and placement of steri-strips. The patient tolerated the procedure well and taken to the recovery room in good condition.      Arthroscopic biceps Tenotomy    Viewing through posterior portal, the base of the biceps tendon visualized and subluxed. The tendon was transected with arthroscopic scissors through the anterior portal.    Distal clavicle resection    A distal clavicle resection was then carried out. Viewing from the posterior portal, the shaver was used to debride the acromioclavicular joint and associated soft tissues through the anterior portal. Once cleared of the soft tissue, the antione was used to resect the distal end of the clavicle concentrating initially upon the inferior and distal clavicular spurs. The resection was then carried superiorly to remove the upper end of the clavicle to remove approximately 5-7mm of spurs. Then the antione was used on the acromion to remove 2-3mm of bone. The resection was then measured with a probe to confirm the 8-10mm of resection.    Arthroscopic greater tuberosity reduction and fixation (repaired in the same fashion as an arthroscopic rotator cuff repair)    Viewing from the posterior portal we can visualize the greater tuberosity fracture laterally.  The greater tuberosity attachment for the rotator cuff was  debrided to good bony base.  An anchor was placed just lateral to the articular surface of the humeral head on the greater tuberosity fracture near the rotator cuff attachment.  Those sutures were then pulled laterally and used for reduction and fixation of the tuberosity fracture.  The lateral anchor was then placed with the sutures and placed in the far lateral just distal to the fracture line of the tuberosity.

## 2022-04-29 NOTE — DISCHARGE SUMMARY
She was admitted directly from my office with severe abdominal pain and mid back pain on the right side.  I felt she had a kidney stone.  After hospitalization, we did a CT of her abdomen which showed a 7 mm hyperdense nonobstructing calculus in the lower pole collecting system of the right kidney.  There was no ureterolithiasis.  There was no nephroureterolithiasis or hydroureteronephrosis.  She did have bilateral perinephric stranding and in fact did have a urinary tract infection.  She was put on some antibiotics.  She also had constipation on CT.  Her uterus was not visualized.  Appendix and gallbladder were surgically absent.  She had cardiomegaly with a thin pericardial effusion.  She had calcified changes of the distal abdominal aorta with calcifications of the iliac vessels also.  She had grossly significant stenosis in these vessels.  She had a mild patchy alveolar opacity in both lungs in the base, resembling a subsegmental/discoid atelectasis.  Abdominal x-ray showed constipation also and postsurgical changes in the right upper abdomen, as well as thoracolumbar spondylosis and scoliosis.  She had atherosclerosis seen as well.  We did discuss this.  She is recovering from a CABG, currently.  Her constipation was resolved with laxatives and stool softeners.  She had no chest pain during hospitalization and she was discharged in stable condition on 03/25/2021.    PLAN:  Discharge patient to home.  Continue Tylenol p.r.n., aspirin 81 mg 1 p.o. daily, Plavix 75 mg 1 p.o. daily, Colace 100 mg b.i.d., Aricept 5 mg daily, Cymbalta 30 mg at bedtime, fenofibrate 145 mg q.p.m., Lasix 20 mg daily, metformin 500 mg b.i.d.  Metoprolol tartrate 25 mg b.i.d., MiraLAX 17 g in 8 ounces water daily, potassium chloride 10 mEq 1 p.o. daily, simvastatin 40 mg 1 p.o. daily, topiramate 25 mg daily, trazodone 50 mg q.h.s. for sleep, and Diovan 160 mg 1 p.o. daily.  Increase activity as tolerated.  Plan to work up her  abdominal atherosclerosis outpatient after she recovers from her CABG.    ASSESSMENT:    1. Right flank and right abdominal pain secondary to urinary tract infection.  2. Kidney stone in the upper collecting system of the kidney, 7 mm and does not look to be obstructing.  3. Coronary artery disease status post recent coronary artery bypass graft.  4. Urinary tract infection, as stated above.  5.   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  6. Obstructive sleep apnea.        PBS/MODL   DD: 03/31/2021 1854   DT: 03/31/2021 2021  Job # [id number]

## 2022-04-29 NOTE — OP NOTE
Patient:   Thu Branch            MRN: [id number]            FIN: [id number]               Age:   70 years     Sex:  Female     :  1950   Associated Diagnoses:   None   Author:   Syliwa Benson MD      Operative Note   Operative Information   Date/ Time:  6/15/2021 07:52:00.     Procedures Performed: Cystourethroscopy with Botox bladder injections.     Preoperative Diagnosis: Refractory urinary incontinence.     Postoperative Diagnosis: Same.     Surgeon: Sylwia Benson MD.     Anesthesia: IV general anesthesia.     Description of Procedure/Findings/    Complications: Patient was taken to operating room IV general sedation was obtained without difficulty she was prepped and draped in usual sterile fashion dorsal lithotomy position cystourethroscopy was performed with the findings as previously stated.  Injector needle was placed through the port and the needle was placed at 2 100 L units of Botox in mixed with 10 mL of saline and injections of 0.2 range exudate in as adequate a pattern as possible secondary to severe bladder contractions throughout the procedure.  Patient did have a urine culture that was negative for pathologic organisms as well as no fever or symptoms of a urinary tract infection.  If this procedure would have recent visit work would recommend InterStim sacral nerve modulation for refractory urinary urge incontinence.     Esimated blood loss: loss less than  10  cc.     Findings: Contractive bladder with trabeculations and bladder diverticulum bilateral ureteral; Dr. Serrano looked at bladder lining and agreed stated that patient possibly had hemorrhagic cystitis.     Complications: None.

## 2022-05-14 NOTE — OP NOTE
Patient:   Thu Branch            MRN: [id number]            FIN: [id number]               Age:   71 years     Sex:  Female     :  1950   Associated Diagnoses:   None   Author:   Sylwia Benson MD      Operative Note   Operative Information   Date/ Time:  2022 08:21:00.     Procedures Performed: Removal of sacral neuromodulator and chronic tined lead.     Preoperative Diagnosis: Pelvic pain  Cellulitis versus hematoma.     Postoperative Diagnosis: Hematoma.     Surgeon: Sylwia Benson MD.     Anesthesia: IV Gen. and local anesthesia.     Speciman Removed: Chronic tined lead and neurostimulator.     Description of Procedure/Findings/    Complications: Patient was properly identified and placed in prone position per OR protocol.  MAC anesthesia was administered.  Pillows were placed under lower abdomen to flatten sacrum and under shins to allow the toes to dangle freely.  Patient was prepped and draped in usual sterile fashion using Hibiclens prep solution Local injection of lidocaine with epinephrine was administered the previous incision so the battery pack to be removed.  Once the battery pack was removed and the lead was unscrewed from its connection  from  the generator and then placed off the table.  At this time dimpling was noted lead had been previously placed; this area was infiltrated  with lidocaine and then the lead was retrieved and brought out through theincision intact. and handed off to the table.  Was then apparent that the drainage has come from an old hematoma.  Patient does state that she falls frequently and has been on Plavix in the most recent past although she's been off for the last 5 days.  Cultures were taken of this incision pocket incision pocket was then irrigated with antibiotic solution in the bleeders were cauterized with Bovie and then Surgicel powder was then placed through the incision incision was then closed in 2 layers with 2-0 Polysorb for the  deeper layer polyp followed by 3-0 Polysorb followed by 4-0 Polysorb to close the subcu cuticular incision a pressure dressing was placed and patient was taken to recovery in stable condition to continue antibiotics.  Office on Monday.  All surgical counts were correct.     Esimated blood loss: loss less than  25  cc.     Findings: The right buttock incision was approximately 4 cm and toward the medial edge there was some incisional drainage that looked to be serosanguineous in nature minimal erythema noted around the incision incision pocket opened and revealed hematoma appearing fluid cultures were taken at this time.     Complications: None.

## 2022-09-21 ENCOUNTER — HOSPITAL ENCOUNTER (INPATIENT)
Facility: HOSPITAL | Age: 72
LOS: 6 days | Discharge: NURSING, REHAB OR LONG-TERM CARE | DRG: 690 | End: 2022-09-27
Attending: INTERNAL MEDICINE | Admitting: FAMILY MEDICINE
Payer: MEDICARE

## 2022-09-21 DIAGNOSIS — R31.9 URINARY TRACT INFECTION WITH HEMATURIA, SITE UNSPECIFIED: Primary | ICD-10-CM

## 2022-09-21 DIAGNOSIS — F19.90 SUBSTANCE USE DISORDER: ICD-10-CM
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
## 2022-09-21 DIAGNOSIS — R07.9 CHEST PAIN: ICD-10-CM

## 2022-09-21 DIAGNOSIS — N39.0 URINARY TRACT INFECTION WITH HEMATURIA, SITE UNSPECIFIED: Primary | ICD-10-CM

## 2022-09-21 DIAGNOSIS — R29.6 RECURRENT FALLS: ICD-10-CM

## 2022-09-21 DIAGNOSIS — R53.1 GENERALIZED WEAKNESS: ICD-10-CM

## 2022-09-21 PROBLEM — E11.9 DIABETES MELLITUS: Status: ACTIVE | Noted: 2022-09-21

## 2022-09-21 PROBLEM — G62.9 POLYNEUROPATHY: Status: ACTIVE | Noted: 2022-09-21

## 2022-09-21 PROBLEM — I50.9 CONGESTIVE HEART FAILURE: Status: ACTIVE | Noted: 2022-09-21

## 2022-09-21 PROBLEM — G47.33 OBSTRUCTIVE SLEEP APNEA SYNDROME: Status: ACTIVE | Noted: 2022-09-21

## 2022-09-21 PROBLEM — F32.A DEPRESSIVE DISORDER: Status: ACTIVE | Noted: 2022-09-21

## 2022-09-21 PROBLEM — F41.1 GENERALIZED ANXIETY DISORDER: Status: ACTIVE | Noted: 2022-09-21

## 2022-09-21 PROBLEM — F41.9 ANXIETY: Status: ACTIVE | Noted: 2022-09-21

## 2022-09-21 PROBLEM — N18.32 STAGE 3B CHRONIC KIDNEY DISEASE: Status: ACTIVE | Noted: 2022-09-21

## 2022-09-21 PROBLEM — I70.209 ATHEROSCLEROSIS OF ARTERIES OF EXTREMITIES: Status: ACTIVE | Noted: 2020-08-04

## 2022-09-21 PROBLEM — K57.30 DIVERTICULOSIS OF COLON: Status: ACTIVE | Noted: 2022-09-21

## 2022-09-21 PROBLEM — I10 ESSENTIAL HYPERTENSION: Status: ACTIVE | Noted: 2022-09-21

## 2022-09-21 PROBLEM — R53.81 DEBILITY: Status: ACTIVE | Noted: 2022-09-21

## 2022-09-21 PROBLEM — F32.1 MAJOR DEPRESSIVE DISORDER, SINGLE EPISODE, MODERATE: Status: ACTIVE | Noted: 2017-12-11

## 2022-09-21 PROBLEM — N30.00 ACUTE CYSTITIS: Status: ACTIVE | Noted: 2022-09-21

## 2022-09-21 PROBLEM — I25.10 CORONARY ARTERY DISEASE: Status: ACTIVE | Noted: 2022-09-21

## 2022-09-21 PROBLEM — E78.00 HYPERCHOLESTEROLEMIA: Status: ACTIVE | Noted: 2022-09-21

## 2022-09-21 PROBLEM — I05.9 RHEUMATIC MITRAL VALVE DISEASE: Status: ACTIVE | Noted: 2022-09-21

## 2022-09-21 LAB
ALBUMIN SERPL-MCNC: 3.2 GM/DL (ref 3.4–4.8)
ALBUMIN/GLOB SERPL: 0.8 RATIO (ref 1.1–2)
ALP SERPL-CCNC: 34 UNIT/L (ref 40–150)
ALT SERPL-CCNC: 16 UNIT/L (ref 0–55)
AMMONIA PLAS-MSCNC: 14.7 UMOL/L (ref 18–72)
AST SERPL-CCNC: 15 UNIT/L (ref 5–34)
BASOPHILS # BLD AUTO: 0.07 X10(3)/MCL (ref 0–0.2)
BASOPHILS NFR BLD AUTO: 0.5 %
BILIRUBIN DIRECT+TOT PNL SERPL-MCNC: 0.6 MG/DL
BUN SERPL-MCNC: 35 MG/DL (ref 9.8–20.1)
CALCIUM SERPL-MCNC: 10.2 MG/DL (ref 8.4–10.2)
CHLORIDE SERPL-SCNC: 101 MMOL/L (ref 98–107)
CK SERPL-CCNC: 58 U/L (ref 29–168)
CO2 SERPL-SCNC: 24 MMOL/L (ref 23–31)
CREAT SERPL-MCNC: 2.15 MG/DL (ref 0.55–1.02)
EOSINOPHIL # BLD AUTO: 0.02 X10(3)/MCL (ref 0–0.9)
EOSINOPHIL NFR BLD AUTO: 0.1 %
ERYTHROCYTE [DISTWIDTH] IN BLOOD BY AUTOMATED COUNT: 13.4 % (ref 11.5–17)
GFR SERPLBLD CREATININE-BSD FMLA CKD-EPI: 24 MLS/MIN/1.73/M2
GLOBULIN SER-MCNC: 3.8 GM/DL (ref 2.4–3.5)
GLUCOSE SERPL-MCNC: 157 MG/DL (ref 82–115)
HCT VFR BLD AUTO: 35.2 % (ref 37–47)
HGB BLD-MCNC: 11.5 GM/DL (ref 12–16)
IMM GRANULOCYTES # BLD AUTO: 0.08 X10(3)/MCL (ref 0–0.04)
IMM GRANULOCYTES NFR BLD AUTO: 0.5 %
LYMPHOCYTES # BLD AUTO: 0.83 X10(3)/MCL (ref 0.6–4.6)
LYMPHOCYTES NFR BLD AUTO: 5.4 %
MCH RBC QN AUTO: 28.9 PG (ref 27–31)
MCHC RBC AUTO-ENTMCNC: 32.7 MG/DL (ref 33–36)
MCV RBC AUTO: 88.4 FL (ref 80–94)
MONOCYTES # BLD AUTO: 1.01 X10(3)/MCL (ref 0.1–1.3)
MONOCYTES NFR BLD AUTO: 6.6 %
NEUTROPHILS # BLD AUTO: 13.3 X10(3)/MCL (ref 2.1–9.2)
NEUTROPHILS NFR BLD AUTO: 86.9 %
PLATELET # BLD AUTO: 284 X10(3)/MCL (ref 130–400)
PMV BLD AUTO: 10.6 FL (ref 7.4–10.4)
POTASSIUM SERPL-SCNC: 3.4 MMOL/L (ref 3.5–5.1)
PROT SERPL-MCNC: 7 GM/DL (ref 5.8–7.6)
RBC # BLD AUTO: 3.98 X10(6)/MCL (ref 4.2–5.4)
SODIUM SERPL-SCNC: 135 MMOL/L (ref 136–145)
TROPONIN I SERPL-MCNC: 0.02 NG/ML (ref 0–0.04)
TSH SERPL-ACNC: 1.27 UIU/ML (ref 0.35–4.94)
WBC # SPEC AUTO: 15.3 X10(3)/MCL (ref 4.5–11.5)

## 2022-09-21 PROCEDURE — 99900035 HC TECH TIME PER 15 MIN (STAT)

## 2022-09-21 PROCEDURE — 99285 EMERGENCY DEPT VISIT HI MDM: CPT | Mod: 25

## 2022-09-21 PROCEDURE — 82140 ASSAY OF AMMONIA: CPT | Performed by: FAMILY MEDICINE

## 2022-09-21 PROCEDURE — 85025 COMPLETE CBC W/AUTO DIFF WBC: CPT | Performed by: INTERNAL MEDICINE

## 2022-09-21 PROCEDURE — 36415 COLL VENOUS BLD VENIPUNCTURE: CPT | Performed by: FAMILY MEDICINE

## 2022-09-21 PROCEDURE — 27000190 HC CPAP FULL FACE MASK W/VALVE

## 2022-09-21 PROCEDURE — 25000003 PHARM REV CODE 250: Performed by: INTERNAL MEDICINE

## 2022-09-21 PROCEDURE — 80053 COMPREHEN METABOLIC PANEL: CPT | Performed by: INTERNAL MEDICINE

## 2022-09-21 PROCEDURE — 94761 N-INVAS EAR/PLS OXIMETRY MLT: CPT

## 2022-09-21 PROCEDURE — 63600175 PHARM REV CODE 636 W HCPCS: Performed by: INTERNAL MEDICINE

## 2022-09-21 PROCEDURE — 63600175 PHARM REV CODE 636 W HCPCS: Performed by: FAMILY MEDICINE

## 2022-09-21 PROCEDURE — 84484 ASSAY OF TROPONIN QUANT: CPT | Performed by: FAMILY MEDICINE

## 2022-09-21 PROCEDURE — 36415 COLL VENOUS BLD VENIPUNCTURE: CPT | Performed by: INTERNAL MEDICINE

## 2022-09-21 PROCEDURE — 82550 ASSAY OF CK (CPK): CPT | Performed by: FAMILY MEDICINE

## 2022-09-21 PROCEDURE — 11000001 HC ACUTE MED/SURG PRIVATE ROOM

## 2022-09-21 PROCEDURE — 84443 ASSAY THYROID STIM HORMONE: CPT | Performed by: INTERNAL MEDICINE

## 2022-09-21 RX ORDER — IBUPROFEN 200 MG
16 TABLET ORAL
Status: DISCONTINUED | OUTPATIENT
Start: 2022-09-21 | End: 2022-09-27 | Stop reason: HOSPADM

## 2022-09-21 RX ORDER — FENOFIBRATE 145 MG/1
1 TABLET, FILM COATED ORAL DAILY
Status: ON HOLD | COMMUNITY
End: 2022-10-11 | Stop reason: HOSPADM

## 2022-09-21 RX ORDER — DIAZEPAM 10 MG/2ML
5 INJECTION INTRAMUSCULAR EVERY 4 HOURS PRN
Status: DISCONTINUED | OUTPATIENT
Start: 2022-09-21 | End: 2022-09-27 | Stop reason: HOSPADM

## 2022-09-21 RX ORDER — ENOXAPARIN SODIUM 100 MG/ML
30 INJECTION SUBCUTANEOUS EVERY 24 HOURS
Status: DISCONTINUED | OUTPATIENT
Start: 2022-09-21 | End: 2022-09-27 | Stop reason: HOSPADM

## 2022-09-21 RX ORDER — SODIUM CHLORIDE 9 MG/ML
1000 INJECTION, SOLUTION INTRAVENOUS
Status: COMPLETED | OUTPATIENT
Start: 2022-09-21 | End: 2022-09-21

## 2022-09-21 RX ORDER — VALSARTAN 160 MG/1
1 TABLET ORAL
Status: ON HOLD | COMMUNITY
End: 2022-10-11 | Stop reason: HOSPADM

## 2022-09-21 RX ORDER — DOXYCYCLINE HYCLATE 50 MG/1
CAPSULE ORAL
Status: ON HOLD | COMMUNITY
End: 2022-10-11 | Stop reason: HOSPADM

## 2022-09-21 RX ORDER — DONEPEZIL HYDROCHLORIDE 10 MG/1
10 TABLET, FILM COATED ORAL DAILY
COMMUNITY
Start: 2022-09-17

## 2022-09-21 RX ORDER — BUMETANIDE 0.5 MG/1
TABLET ORAL
Status: ON HOLD | COMMUNITY
Start: 2022-09-16 | End: 2022-10-11 | Stop reason: HOSPADM

## 2022-09-21 RX ORDER — SODIUM CHLORIDE 0.9 % (FLUSH) 0.9 %
10 SYRINGE (ML) INJECTION EVERY 12 HOURS PRN
Status: DISCONTINUED | OUTPATIENT
Start: 2022-09-21 | End: 2022-09-27 | Stop reason: HOSPADM

## 2022-09-21 RX ORDER — CEFTRIAXONE 1 G/1
1 INJECTION, POWDER, FOR SOLUTION INTRAMUSCULAR; INTRAVENOUS
Status: DISCONTINUED | OUTPATIENT
Start: 2022-09-21 | End: 2022-09-21

## 2022-09-21 RX ORDER — CLOPIDOGREL BISULFATE 75 MG/1
75 TABLET ORAL DAILY
Status: DISCONTINUED | OUTPATIENT
Start: 2022-09-22 | End: 2022-09-27 | Stop reason: HOSPADM

## 2022-09-21 RX ORDER — KETOROLAC TROMETHAMINE 30 MG/ML
10 INJECTION, SOLUTION INTRAMUSCULAR; INTRAVENOUS EVERY 6 HOURS PRN
Status: DISPENSED | OUTPATIENT
Start: 2022-09-21 | End: 2022-09-24

## 2022-09-21 RX ORDER — SODIUM CHLORIDE AND POTASSIUM CHLORIDE 300; 900 MG/100ML; MG/100ML
INJECTION, SOLUTION INTRAVENOUS CONTINUOUS
Status: DISCONTINUED | OUTPATIENT
Start: 2022-09-21 | End: 2022-09-22

## 2022-09-21 RX ORDER — IBUPROFEN 200 MG
24 TABLET ORAL
Status: DISCONTINUED | OUTPATIENT
Start: 2022-09-21 | End: 2022-09-27 | Stop reason: HOSPADM

## 2022-09-21 RX ORDER — GLUCAGON 1 MG
1 KIT INJECTION
Status: DISCONTINUED | OUTPATIENT
Start: 2022-09-21 | End: 2022-09-27 | Stop reason: HOSPADM

## 2022-09-21 RX ORDER — CLOPIDOGREL BISULFATE 75 MG/1
75 TABLET ORAL DAILY
Status: ON HOLD | COMMUNITY
Start: 2022-09-19 | End: 2022-10-11 | Stop reason: HOSPADM

## 2022-09-21 RX ORDER — NALOXONE HCL 0.4 MG/ML
0.02 VIAL (ML) INJECTION
Status: DISCONTINUED | OUTPATIENT
Start: 2022-09-21 | End: 2022-09-27 | Stop reason: HOSPADM

## 2022-09-21 RX ORDER — DONEPEZIL HYDROCHLORIDE 5 MG/1
10 TABLET, FILM COATED ORAL DAILY
Status: DISCONTINUED | OUTPATIENT
Start: 2022-09-22 | End: 2022-09-25

## 2022-09-21 RX ORDER — CHLORTHALIDONE 25 MG/1
25 TABLET ORAL DAILY
Status: ON HOLD | COMMUNITY
Start: 2022-05-04 | End: 2022-10-11 | Stop reason: HOSPADM

## 2022-09-21 RX ORDER — VALSARTAN 80 MG/1
80 TABLET ORAL DAILY
Status: DISCONTINUED | OUTPATIENT
Start: 2022-09-22 | End: 2022-09-27 | Stop reason: HOSPADM

## 2022-09-21 RX ORDER — DONEPEZIL HYDROCHLORIDE 10 MG/1
1 TABLET, FILM COATED ORAL NIGHTLY
COMMUNITY
Start: 2021-09-27 | End: 2022-09-21 | Stop reason: SDUPTHER

## 2022-09-21 RX ORDER — TERBINAFINE HYDROCHLORIDE 250 MG/1
1 TABLET ORAL
Status: ON HOLD | COMMUNITY
Start: 2022-09-16 | End: 2022-10-11 | Stop reason: HOSPADM

## 2022-09-21 RX ORDER — ONDANSETRON 2 MG/ML
4 INJECTION INTRAMUSCULAR; INTRAVENOUS EVERY 8 HOURS PRN
Status: DISCONTINUED | OUTPATIENT
Start: 2022-09-21 | End: 2022-09-27 | Stop reason: HOSPADM

## 2022-09-21 RX ADMIN — KETOROLAC TROMETHAMINE 10 MG: 30 INJECTION, SOLUTION INTRAMUSCULAR; INTRAVENOUS at 06:09

## 2022-09-21 RX ADMIN — POTASSIUM CHLORIDE AND SODIUM CHLORIDE: 900; 300 INJECTION, SOLUTION INTRAVENOUS at 05:09

## 2022-09-21 RX ADMIN — CEFTRIAXONE SODIUM 1 G: 1 INJECTION, POWDER, FOR SOLUTION INTRAMUSCULAR; INTRAVENOUS at 04:09

## 2022-09-21 RX ADMIN — POTASSIUM CHLORIDE AND SODIUM CHLORIDE: 900; 300 INJECTION, SOLUTION INTRAVENOUS at 11:09

## 2022-09-21 RX ADMIN — ENOXAPARIN SODIUM 30 MG: 30 INJECTION SUBCUTANEOUS at 05:09

## 2022-09-21 RX ADMIN — DIAZEPAM 5 MG: 5 INJECTION INTRAMUSCULAR; INTRAVENOUS at 10:09

## 2022-09-21 RX ADMIN — SODIUM CHLORIDE 1000 ML: 9 INJECTION, SOLUTION INTRAVENOUS at 03:09

## 2022-09-21 NOTE — ED PROVIDER NOTES
09/21/2022         2:51 PM    Source of History:  History obtained from the patient.       Chief complaint:  From Nurse Triage:  Fall (Fall and hurt right humerus, falling a lot at home)    HPI:  Thu Branch is a 72 y.o. female presenting with Fall (Fall and hurt right humerus, falling a lot at home)       Patient returns to the emergency room with complaint of right shoulder pain, she had a fall yesterday and was seen in the emergency room during the night, she has been having recurrent falls, having generalized weakness, so today she was still complaining of pain in the right arm so family brought her to the emergency room.  Patient herself have only complaint of shoulder pain and anterior chest wall pain from a fall 3 weeks back    Review of Systems   Constitutional symptoms:  No Fever. No Chills    Skin symptoms:  No Rash.    Eye symptoms:  No Visual disturbance reported.   ENMT symptoms:  No Sore throat,    Respiratory symptoms:  No Shortness of Breath, no Cough, no Wheezing.    Cardiovascular symptoms:  No Chest Pain, No Palpitations, No Tachycardia.    Gastrointestinal symptoms:  No Abdominal Pain, No Nausea, No Vomiting, No Diarrhea, No Constipation.    Genitourinary symptoms:  No Dysuria,    Musculoskeletal symptoms:  No Back pain,  right shoulder pain  Neurologic symptoms:  No Headache, No Dizziness.  Recurrent falls  Psychiatric symptoms:  No Anxiety, No Depression, No Substance Abuse.              Additional review of systems information: Patient Denies Any Other Complaints.  All Other Systems Reviewed With Patient And Negative.    ALLEGIES:  Review of patient's allergies indicates:   Allergen Reactions    Adhesive tape-silicones Blisters    Chlorpheniramine-pseudoephed Other (See Comments)    Codeine Itching    Morphine Itching     Other reaction(s): Unknown    Statins-hmg-coa reductase inhibitors Nausea Only and Rash     Other reaction(s): muscle weakness       HOME MEDICINES:    Current  Facility-Administered Medications:     0.9 % NaCl with KCl 40 mEq infusion, , Intravenous, Continuous, Joseph Vides MD, Last Rate: 150 mL/hr at 09/21/22 1712, New Bag at 09/21/22 1712    cefTRIAXone (ROCEPHIN) 1 g in dextrose 5 % in water (D5W) 5 % 50 mL IVPB (MB+), 1 g, Intravenous, Q24H, Helena Truong MD, Stopped at 09/21/22 1652    [START ON 9/22/2022] clopidogreL tablet 75 mg, 75 mg, Oral, Daily, Joseph Vides MD    dextrose 10% bolus 125 mL, 12.5 g, Intravenous, PRN, Joseph Vides MD    dextrose 10% bolus 250 mL, 25 g, Intravenous, PRN, Joseph Vides MD    diazePAM injection 5 mg, 5 mg, Intravenous, Q4H PRN, Joseph Vides MD    [START ON 9/22/2022] donepeziL tablet 10 mg, 10 mg, Oral, Daily, Joseph Vides MD    enoxaparin injection 30 mg, 30 mg, Subcutaneous, Daily, Joseph Vides MD, 30 mg at 09/21/22 1712    glucagon (human recombinant) injection 1 mg, 1 mg, Intramuscular, PRN, Joseph Vides MD    glucose chewable tablet 16 g, 16 g, Oral, PRN, Joseph Vides MD    glucose chewable tablet 24 g, 24 g, Oral, PRN, Joseph Vides MD    naloxone 0.4 mg/mL injection 0.02 mg, 0.02 mg, Intravenous, PRN, Joseph Vides MD    ondansetron injection 4 mg, 4 mg, Intravenous, Q8H PRN, Joseph Vides MD    sodium chloride 0.9% flush 10 mL, 10 mL, Intravenous, Q12H PRN, Joseph Vides MD    [START ON 9/22/2022] valsartan tablet 80 mg, 80 mg, Oral, Daily, Joseph Vides MD    PMH:  As per HPI and below:    Reviewed and updated in chart.    PAST MEDICAL HISTORY:  History reviewed. No pertinent past medical history.     PAST SURGICAL HISTORY:  History reviewed. No pertinent surgical history.    SOCIAL HISTORY:       FAMILY HISTORY:  History reviewed. No pertinent family history.     PROBLEM LIST:  Patient Active Problem List   Diagnosis    Anxiety    Congestive heart failure    Coronary artery disease    Depressive disorder     Diabetes mellitus    Diverticulosis of colon    Generalized anxiety disorder    Hypercholesterolemia    Essential hypertension    Major depressive disorder, single episode, moderate    Obstructive sleep apnea syndrome    Atherosclerosis of arteries of extremities    Polyneuropathy    Rheumatic mitral valve disease    Stage 3b chronic kidney disease    Debility    Acute cystitis        PHYSICAL EXAM:      ED Triage Vitals [09/21/22 1347]   /62   Pulse 82   Resp 18   Temp    SpO2 100 %        Vital Signs: Reviewed As In Chart.  General:  Alert, No Cardiorespiratory Distress Noted.   Skin: Normal For Ethnic Origin  Eye:  Extraocular Movements Are Intact.   ENT: Mucus membranes are moist.   Cardiovascular:  Regular Rate And Rhythm, No Murmur, No Pedal Edema.    Respiratory:  Respirations Nonlabored, No Respiratory Distress, Good Bilateral Air Entry, No Rales, No Rhonchi.    Musculoskeletal:  Tenderness in the right anterior chest wall, tenderness in the right shoulder and upper humerus.    Gastrointestinal:  Soft, Non Distended, Non Tender, Normal Bowel Sounds.    Neurological:  Alert And Oriented To Person, Place, Time, And Situation, Normal Motor Observed, Normal Speech Observed.    Psychiatric:  Cooperative, Appropriate Mood & Affect.    INITIAL IMPRESSION/ DIFFERENTIAL DX:      MEDICAL DECISION MAKING:      Reviewed Nurses Note. Reviewed Vital Signs.     Reviewed Pertinent old records, History and updated as necessary.    72 y.o. female with Fall (Fall and hurt right humerus, falling a lot at home)    Patient with recurrent falls, history of for generalized weakness, has had these problems multiple times, has ended up in the emergency room and in nursing home a few times where she gets better and goes back to home and started having the problems again.  So the family brought her today to the emergency room for evaluation where she was seen yesterday and she was found to have a couple of drugs on her which  family feels is somebody from the family is bringing to her.  They feel it is dangerous for her to go and stay at home by herself alone, patient does have a urinary tract infection, and having recurrent falls, I will talk to family physician to admit her in the hospital for further management as needed.    ED WORKUP AND COURSE:  ED ORDERS:  Orders Placed This Encounter   Procedures    X-Ray Humerus 2 View Right    X-Ray Chest 1 View    CBC auto differential    Comprehensive metabolic panel    TSH    CBC with Differential    CBC Auto Differential    Comprehensive Metabolic Panel    Phosphorus    Magnesium    Ammonia    Prealbumin    Troponin I    COVID/FLU A&B PCR    Cortisol, Serum    CK    Vital Signs    Bladder scan    Notify Physician    Place sequential compression device    Recheck Blood Glucose:    Cardiac Monitoring - Adult    Full code    EKG 12-lead    Insert Saline lock IV    Insert saline lock    Admit to Inpatient       Medications   sodium chloride 0.9% flush 10 mL (has no administration in time range)   naloxone 0.4 mg/mL injection 0.02 mg (has no administration in time range)   glucose chewable tablet 16 g (has no administration in time range)   glucose chewable tablet 24 g (has no administration in time range)   glucagon (human recombinant) injection 1 mg (has no administration in time range)   dextrose 10% bolus 125 mL (has no administration in time range)   dextrose 10% bolus 250 mL (has no administration in time range)   enoxaparin injection 30 mg (30 mg Subcutaneous Given 9/21/22 1712)   ondansetron injection 4 mg (has no administration in time range)   cefTRIAXone (ROCEPHIN) 1 g in dextrose 5 % in water (D5W) 5 % 50 mL IVPB (MB+) (0 g Intravenous Stopped 9/21/22 1652)   clopidogreL tablet 75 mg (has no administration in time range)   donepeziL tablet 10 mg (has no administration in time range)   valsartan tablet 80 mg (has no administration in time range)   0.9 % NaCl with KCl 40 mEq infusion (  Intravenous New Bag 9/21/22 1712)   diazePAM injection 5 mg (has no administration in time range)   0.9%  NaCl infusion (1,000 mLs Intravenous New Bag 9/21/22 1518)                ED LABS ORDERED AND REVIEWED:  Admission on 09/21/2022   Component Date Value Ref Range Status    Sodium Level 09/21/2022 135 (L)  136 - 145 mmol/L Final    Potassium Level 09/21/2022 3.4 (L)  3.5 - 5.1 mmol/L Final    Chloride 09/21/2022 101  98 - 107 mmol/L Final    Carbon Dioxide 09/21/2022 24  23 - 31 mmol/L Final    Glucose Level 09/21/2022 157 (H)  82 - 115 mg/dL Final    Blood Urea Nitrogen 09/21/2022 35.0 (H)  9.8 - 20.1 mg/dL Final    Creatinine 09/21/2022 2.15 (H)  0.55 - 1.02 mg/dL Final    Calcium Level Total 09/21/2022 10.2  8.4 - 10.2 mg/dL Final    Protein Total 09/21/2022 7.0  5.8 - 7.6 gm/dL Final    Albumin Level 09/21/2022 3.2 (L)  3.4 - 4.8 gm/dL Final    Globulin 09/21/2022 3.8 (H)  2.4 - 3.5 gm/dL Final    Albumin/Globulin Ratio 09/21/2022 0.8 (L)  1.1 - 2.0 ratio Final    Bilirubin Total 09/21/2022 0.6  <=1.5 mg/dL Final    Alkaline Phosphatase 09/21/2022 34 (L)  40 - 150 unit/L Final    Alanine Aminotransferase 09/21/2022 16  0 - 55 unit/L Final    Aspartate Aminotransferase 09/21/2022 15  5 - 34 unit/L Final    eGFR 09/21/2022 24  mls/min/1.73/m2 Final    Thyroid Stimulating Hormone 09/21/2022 1.2714  0.3500 - 4.9400 uIU/mL Final    WBC 09/21/2022 15.3 (H)  4.5 - 11.5 x10(3)/mcL Final    RBC 09/21/2022 3.98 (L)  4.20 - 5.40 x10(6)/mcL Final    Hgb 09/21/2022 11.5 (L)  12.0 - 16.0 gm/dL Final    Hct 09/21/2022 35.2 (L)  37.0 - 47.0 % Final    MCV 09/21/2022 88.4  80.0 - 94.0 fL Final    MCH 09/21/2022 28.9  27.0 - 31.0 pg Final    MCHC 09/21/2022 32.7 (L)  33.0 - 36.0 mg/dL Final    RDW 09/21/2022 13.4  11.5 - 17.0 % Final    Platelet 09/21/2022 284  130 - 400 x10(3)/mcL Final    MPV 09/21/2022 10.6 (H)  7.4 - 10.4 fL Final    Neut % 09/21/2022 86.9  % Final    Lymph % 09/21/2022 5.4  % Final    Mono % 09/21/2022  6.6  % Final    Eos % 09/21/2022 0.1  % Final    Basophil % 09/21/2022 0.5  % Final    Lymph # 09/21/2022 0.83  0.6 - 4.6 x10(3)/mcL Final    Neut # 09/21/2022 13.3 (H)  2.1 - 9.2 x10(3)/mcL Final    Mono # 09/21/2022 1.01  0.1 - 1.3 x10(3)/mcL Final    Eos # 09/21/2022 0.02  0 - 0.9 x10(3)/mcL Final    Baso # 09/21/2022 0.07  0 - 0.2 x10(3)/mcL Final    IG# 09/21/2022 0.08 (H)  0 - 0.04 x10(3)/mcL Final    IG% 09/21/2022 0.5  % Final    Ammonia Level 09/21/2022 14.7 (L)  18.0 - 72.0 umol/L Final    Troponin-I 09/21/2022 0.023  0.000 - 0.045 ng/mL Final    Creatine Kinase 09/21/2022 58  29 - 168 U/L Final   Admission on 09/20/2022, Discharged on 09/21/2022   Component Date Value Ref Range Status    Color, UA 09/20/2022 Yellow  Yellow, Colorless, Other, Clear Final    Appearance, UA 09/20/2022 Slightly Cloudy (A)  Clear Final    Specific Gravity, UA 09/20/2022 1.015   Final    pH, UA 09/20/2022 6.5  5.0, 5.5, 6.0, 6.5, 7.0, 7.5, 8.0, 8.5 Final    Protein, UA 09/20/2022 30 (A)  Negative, 300  mg/dL Final    Glucose, UA 09/20/2022 250 (A)  Negative, Normal mg/dL Final    Ketones, UA 09/20/2022 Negative  Negative, +1, +2, +3, +4, +5, >=160, >=80 mg/dL Final    Blood, UA 09/20/2022 Trace-Lysed (A)  Negative unit/L Final    Bilirubin, UA 09/20/2022 Negative  Negative mg/dL Final    Urobilinogen, UA 09/20/2022 1.0  0.2, 1.0, Normal mg/dL Final    Nitrites, UA 09/20/2022 Positive (A)  Negative Final    Leukocyte Esterase, UA 09/20/2022 Small (A)  Negative, 75  unit/L Final    Amphetamines, Urine 09/20/2022 Negative  Negative Final    Barbituates, Urine 09/20/2022 Negative  Negative Final    Benzodiazepine, Urine 09/20/2022 Negative  Negative Final    Cannabinoids, Urine 09/20/2022 Negative  Negative Final    Cocaine, Urine 09/20/2022 Negative  Negative Final    Fentanyl, Urine 09/20/2022 Positive (A)  Negative Final    MDMA, Urine 09/20/2022 Positive (A)  Negative Final    Opiates, Urine 09/20/2022 Negative  Negative Final     Phencyclidine, Urine 09/20/2022 Negative  Negative Final    pH, Urine 09/20/2022 6.5  3.0 - 11.0 Final    Specific Gravity, Urine Auto 09/20/2022 1.015  1.001 - 1.035 Final    Bacteria, UA 09/20/2022 Many (A)  None Seen, Rare, Occasional /HPF Final    RBC, UA 09/20/2022 21-50 (A)  None Seen, 0-2, 3-5, 0-5 /HPF Final    WBC, UA 09/20/2022 50-99 (A)  None Seen, 0-2, 3-5, 0-5 /HPF Final    Squamous Epithelial Cells, UA 09/20/2022 None Seen  None Seen, Rare, Occasional, Occ /HPF Final       RADIOLOGY STUDIES ORDERED AND REVIEWED:  Imaging Results              X-Ray Chest 1 View (Final result)  Result time 09/21/22 15:40:56      Final result by Adonay Nieves MD (09/21/22 15:40:56)                   Impression:      1. No active cardiopulmonary disease identified      Electronically signed by: Adonay Nieves  Date:    09/21/2022  Time:    15:40               Narrative:    EXAMINATION:  XR CHEST 1 VIEW    CLINICAL HISTORY:  fall;, .    COMPARISON:  01/23/2022    FINDINGS:  An AP view or more reveals the heart to be normal in size.  The trachea is midline.  Atherosclerosis is seen within the aorta.  A right shoulder prosthetic is again identified.  No infiltrate or effusion is seen.  Post sternotomy changes present.  Postsurgical changes stabilization hardware again evident at the cervical spine.  Orthopedic hardware is evident at the left shoulder.  No pneumothorax is seen.  Bony structures are osteopenic.                                       X-Ray Humerus 2 View Right (Final result)  Result time 09/21/22 15:26:29      Final result by Adonay Nieves MD (09/21/22 15:26:29)                   Impression:      1. No definite acute osseous defect identified  2. Right shoulder prosthetic  3. Calcifications again evident medial and lateral to the prosthetic suspicious for postsurgical changes which have a somewhat similar appearance to the prior exam  4. Osteopenia      Electronically signed by: Adonay  Ezequiel  Date:    09/21/2022  Time:    15:26               Narrative:    EXAMINATION:  XR HUMERUS 2 VIEW RIGHT    CLINICAL HISTORY:  Unspecified fall, initial encounter; .    COMPARISON:  09/20/2022, 08/03/2020    FINDINGS:  AP and lateral views a right shoulder prosthetic is again identified which appears seated in anatomic alignment and position.  Small calcifications are again noted medial and lateral to the prosthetic suspicious for postsurgical changes.  Bony structures are osteopenic.  No aggressive osteolytic or osteoblastic lesions seen.                                      ED COURSE AND REEVALUATIONS:  Vitals:    09/21/22 1649   BP: 135/80   Pulse: 86   Resp:    Temp: 98.1 °F (36.7 °C)       PROCEDURES PERFORMED IN ED:  Procedures  I talked to Dr. Almeida fellow, and we decided to admit her for urinary tract infection, and further management of her acute symptoms and then possible admission in the nursing home.              DIAGNOSTIC IMPRESSION:      1. Urinary tract infection with hematuria, site unspecified    2. Fall    3. Recurrent falls    4. Substance use disorder    5. Generalized weakness    6. Chest pain         ED Disposition Condition    Admit Stable               Medication List        ASK your doctor about these medications      bumetanide 0.5 MG Tab  Commonly known as: BUMEX     chlorthalidone 25 MG Tab  Commonly known as: HYGROTEN     clopidogreL 75 mg tablet  Commonly known as: PLAVIX     donepeziL 10 MG tablet  Commonly known as: ARICEPT  Ask about: Which instructions should I use?     doxycycline 50 MG capsule     fenofibrate 145 MG tablet  Commonly known as: TRICOR     terbinafine  mg tablet  Commonly known as: LAMISIL     valsartan 160 MG tablet  Commonly known as: VLADIMIR Truong MD  09/21/22 4286

## 2022-09-21 NOTE — ED NOTES
Pt arrives per ambulance after losing her balance and falling at home. Pt c/o rt forearm pain, no deformity noted.

## 2022-09-22 LAB
ALBUMIN SERPL-MCNC: 2.8 GM/DL (ref 3.4–4.8)
ALBUMIN/GLOB SERPL: 0.8 RATIO (ref 1.1–2)
ALP SERPL-CCNC: 33 UNIT/L (ref 40–150)
ALT SERPL-CCNC: 14 UNIT/L (ref 0–55)
AST SERPL-CCNC: 15 UNIT/L (ref 5–34)
BASOPHILS # BLD AUTO: 0.03 X10(3)/MCL (ref 0–0.2)
BASOPHILS NFR BLD AUTO: 0.3 %
BILIRUBIN DIRECT+TOT PNL SERPL-MCNC: 0.4 MG/DL
BUN SERPL-MCNC: 35 MG/DL (ref 9.8–20.1)
CALCIUM SERPL-MCNC: 9.7 MG/DL (ref 8.4–10.2)
CHLORIDE SERPL-SCNC: 107 MMOL/L (ref 98–107)
CO2 SERPL-SCNC: 21 MMOL/L (ref 23–31)
CREAT SERPL-MCNC: 1.72 MG/DL (ref 0.55–1.02)
EOSINOPHIL # BLD AUTO: 0.02 X10(3)/MCL (ref 0–0.9)
EOSINOPHIL NFR BLD AUTO: 0.2 %
ERYTHROCYTE [DISTWIDTH] IN BLOOD BY AUTOMATED COUNT: 13.4 % (ref 11.5–17)
FERRITIN SERPL-MCNC: 130.7 NG/ML (ref 4.63–204)
FLUAV AG UPPER RESP QL IA.RAPID: NOT DETECTED
FLUBV AG UPPER RESP QL IA.RAPID: NOT DETECTED
FOLATE SERPL-MCNC: 13.5 NG/ML (ref 7–31.4)
GFR SERPLBLD CREATININE-BSD FMLA CKD-EPI: 32 MLS/MIN/1.73/M2
GLOBULIN SER-MCNC: 3.6 GM/DL (ref 2.4–3.5)
GLUCOSE SERPL-MCNC: 133 MG/DL (ref 82–115)
HCT VFR BLD AUTO: 31.4 % (ref 37–47)
HGB BLD-MCNC: 9.8 GM/DL (ref 12–16)
IMM GRANULOCYTES # BLD AUTO: 0.05 X10(3)/MCL (ref 0–0.04)
IMM GRANULOCYTES NFR BLD AUTO: 0.6 %
IRON SATN MFR SERPL: 3 % (ref 20–50)
IRON SERPL-MCNC: 8 UG/DL (ref 50–170)
LYMPHOCYTES # BLD AUTO: 0.41 X10(3)/MCL (ref 0.6–4.6)
LYMPHOCYTES NFR BLD AUTO: 4.7 %
MAGNESIUM SERPL-MCNC: 1.6 MG/DL (ref 1.6–2.6)
MCH RBC QN AUTO: 28.3 PG (ref 27–31)
MCHC RBC AUTO-ENTMCNC: 31.2 MG/DL (ref 33–36)
MCV RBC AUTO: 90.8 FL (ref 80–94)
MONOCYTES # BLD AUTO: 0.49 X10(3)/MCL (ref 0.1–1.3)
MONOCYTES NFR BLD AUTO: 5.6 %
NEUTROPHILS # BLD AUTO: 7.8 X10(3)/MCL (ref 2.1–9.2)
NEUTROPHILS NFR BLD AUTO: 88.6 %
PHOSPHATE SERPL-MCNC: 1.9 MG/DL (ref 2.3–4.7)
PLATELET # BLD AUTO: 238 X10(3)/MCL (ref 130–400)
PMV BLD AUTO: 10.9 FL (ref 7.4–10.4)
POTASSIUM SERPL-SCNC: 4.5 MMOL/L (ref 3.5–5.1)
PREALB SERPL-MCNC: 12.9 MG/DL (ref 14–37)
PROT SERPL-MCNC: 6.4 GM/DL (ref 5.8–7.6)
RBC # BLD AUTO: 3.46 X10(6)/MCL (ref 4.2–5.4)
RET# (OHS): 0.07 (ref 0.02–0.08)
RETICULOCYTE COUNT AUTOMATED (OLG): 1.92 % (ref 1.1–2.1)
SARS-COV-2 RNA RESP QL NAA+PROBE: NOT DETECTED
SODIUM SERPL-SCNC: 135 MMOL/L (ref 136–145)
TIBC SERPL-MCNC: 288 UG/DL (ref 70–310)
TIBC SERPL-MCNC: 296 UG/DL (ref 250–450)
VIT B12 SERPL-MCNC: 239 PG/ML (ref 213–816)
WBC # SPEC AUTO: 8.8 X10(3)/MCL (ref 4.5–11.5)

## 2022-09-22 PROCEDURE — 87636 SARSCOV2 & INF A&B AMP PRB: CPT | Performed by: FAMILY MEDICINE

## 2022-09-22 PROCEDURE — 82728 ASSAY OF FERRITIN: CPT | Performed by: FAMILY MEDICINE

## 2022-09-22 PROCEDURE — 99900035 HC TECH TIME PER 15 MIN (STAT)

## 2022-09-22 PROCEDURE — 11000001 HC ACUTE MED/SURG PRIVATE ROOM

## 2022-09-22 PROCEDURE — 25000003 PHARM REV CODE 250: Performed by: INTERNAL MEDICINE

## 2022-09-22 PROCEDURE — 82533 TOTAL CORTISOL: CPT | Performed by: FAMILY MEDICINE

## 2022-09-22 PROCEDURE — 82746 ASSAY OF FOLIC ACID SERUM: CPT | Performed by: FAMILY MEDICINE

## 2022-09-22 PROCEDURE — 63600175 PHARM REV CODE 636 W HCPCS: Performed by: FAMILY MEDICINE

## 2022-09-22 PROCEDURE — 94761 N-INVAS EAR/PLS OXIMETRY MLT: CPT

## 2022-09-22 PROCEDURE — 82607 VITAMIN B-12: CPT | Performed by: FAMILY MEDICINE

## 2022-09-22 PROCEDURE — 85025 COMPLETE CBC W/AUTO DIFF WBC: CPT | Performed by: FAMILY MEDICINE

## 2022-09-22 PROCEDURE — 83540 ASSAY OF IRON: CPT | Performed by: FAMILY MEDICINE

## 2022-09-22 PROCEDURE — 63600175 PHARM REV CODE 636 W HCPCS: Performed by: INTERNAL MEDICINE

## 2022-09-22 PROCEDURE — 25000003 PHARM REV CODE 250: Performed by: FAMILY MEDICINE

## 2022-09-22 PROCEDURE — 84134 ASSAY OF PREALBUMIN: CPT | Performed by: FAMILY MEDICINE

## 2022-09-22 PROCEDURE — 36415 COLL VENOUS BLD VENIPUNCTURE: CPT | Performed by: FAMILY MEDICINE

## 2022-09-22 PROCEDURE — 80053 COMPREHEN METABOLIC PANEL: CPT | Performed by: FAMILY MEDICINE

## 2022-09-22 PROCEDURE — 85045 AUTOMATED RETICULOCYTE COUNT: CPT | Performed by: FAMILY MEDICINE

## 2022-09-22 PROCEDURE — 84100 ASSAY OF PHOSPHORUS: CPT | Performed by: FAMILY MEDICINE

## 2022-09-22 PROCEDURE — 83735 ASSAY OF MAGNESIUM: CPT | Performed by: FAMILY MEDICINE

## 2022-09-22 RX ORDER — SODIUM CHLORIDE 9 MG/ML
INJECTION, SOLUTION INTRAVENOUS CONTINUOUS
Status: DISCONTINUED | OUTPATIENT
Start: 2022-09-22 | End: 2022-09-24

## 2022-09-22 RX ORDER — TRAZODONE HYDROCHLORIDE 50 MG/1
100 TABLET ORAL NIGHTLY
Status: DISCONTINUED | OUTPATIENT
Start: 2022-09-22 | End: 2022-09-27 | Stop reason: HOSPADM

## 2022-09-22 RX ADMIN — SODIUM CHLORIDE: 9 INJECTION, SOLUTION INTRAVENOUS at 02:09

## 2022-09-22 RX ADMIN — CEFTRIAXONE SODIUM 1 G: 1 INJECTION, POWDER, FOR SOLUTION INTRAMUSCULAR; INTRAVENOUS at 04:09

## 2022-09-22 RX ADMIN — DONEPEZIL HYDROCHLORIDE 10 MG: 5 TABLET ORAL at 08:09

## 2022-09-22 RX ADMIN — KETOROLAC TROMETHAMINE 10 MG: 30 INJECTION, SOLUTION INTRAMUSCULAR; INTRAVENOUS at 08:09

## 2022-09-22 RX ADMIN — VALSARTAN 80 MG: 80 TABLET ORAL at 08:09

## 2022-09-22 RX ADMIN — TRAZODONE HYDROCHLORIDE 100 MG: 50 TABLET ORAL at 09:09

## 2022-09-22 RX ADMIN — POTASSIUM CHLORIDE AND SODIUM CHLORIDE: 900; 300 INJECTION, SOLUTION INTRAVENOUS at 09:09

## 2022-09-22 RX ADMIN — KETOROLAC TROMETHAMINE 10 MG: 30 INJECTION, SOLUTION INTRAMUSCULAR; INTRAVENOUS at 04:09

## 2022-09-22 RX ADMIN — POTASSIUM CHLORIDE AND SODIUM CHLORIDE: 900; 300 INJECTION, SOLUTION INTRAVENOUS at 06:09

## 2022-09-22 RX ADMIN — CLOPIDOGREL 75 MG: 75 TABLET, FILM COATED ORAL at 08:09

## 2022-09-22 RX ADMIN — ENOXAPARIN SODIUM 30 MG: 30 INJECTION SUBCUTANEOUS at 04:09

## 2022-09-22 NOTE — NURSING
PHYSICIAN ROUNDED ON PATIENT. BASED ON CURRENT DRUG SCREEN PHYSICIAN REQUESTED RESTRICTING CERTAIN VISITORS FROM PATIENT'S ROOM.  PATIENT HAS NO PRIOR HISTORY OF DRUG ABUSE. MADE ALL NECESSARY STAFF AWARE OF PHYSICIAN'S REQUEST.

## 2022-09-22 NOTE — NURSING
[facility] NOTIFIED OF PATIENT DRUG SCREEN BY PATIENT'S SON. PATIENT WAS QUESTIONED BY BOTH REPRESENTATIVES. FOLLOWING THEIR VISIT WITH THE PATIENT DEPUTIES REQUESTED COPY OF DRUG SCREEN FROM MYSELF. DID NOT RELEASE INFORMATION AT THIS TIME. NOTIFIED SUPERVISION OF REQUEST, WHO THEN HAD THE PATIENT SIGN APPROPRIATE RELEASE CONSENTS. DEPUTIES WERE THEN REDIRECTED TO MEDICAL RECORDS.

## 2022-09-22 NOTE — PLAN OF CARE
Per Alexandrea, d/c coordinator at [facility], due to pt having hx of MDD and Anxiety, we have to get a psych eval for state in order to obtain 142 for placement. Order placed.  Notified [facility], contracted w/ [facility], for psych eval.   Gave information to Christine at [facility].

## 2022-09-22 NOTE — PLAN OF CARE
EPS on the unit, stated that they spoke to patient, stated that the police called them.  EPS worker states that pt has a positive drug screen w/Fentanyl and MDMA.  EPS worker stated that pt is wanting to go live as shelter at [facility].  Will send referral.

## 2022-09-22 NOTE — NURSING
During the admission assessment, patient states he has home health and goes to our wound care clinic for a wound to his right ankle and requires dressing changes every two days. Patient refused to allow the dressing to be removed and photographed for an assessment.

## 2022-09-23 LAB
ALBUMIN SERPL-MCNC: 2.5 GM/DL (ref 3.4–4.8)
ALBUMIN/GLOB SERPL: 0.7 RATIO (ref 1.1–2)
ALP SERPL-CCNC: 33 UNIT/L (ref 40–150)
ALT SERPL-CCNC: 17 UNIT/L (ref 0–55)
AST SERPL-CCNC: 18 UNIT/L (ref 5–34)
BASOPHILS # BLD AUTO: 0.01 X10(3)/MCL (ref 0–0.2)
BASOPHILS NFR BLD AUTO: 0.2 %
BILIRUBIN DIRECT+TOT PNL SERPL-MCNC: 0.2 MG/DL
BUN SERPL-MCNC: 24 MG/DL (ref 9.8–20.1)
CALCIUM SERPL-MCNC: 9.7 MG/DL (ref 8.4–10.2)
CHLORIDE SERPL-SCNC: 106 MMOL/L (ref 98–107)
CO2 SERPL-SCNC: 22 MMOL/L (ref 23–31)
CORTIS SERPL IA-MCNC: 17 MCG/DL
CREAT SERPL-MCNC: 1.15 MG/DL (ref 0.55–1.02)
EOSINOPHIL # BLD AUTO: 0.07 X10(3)/MCL (ref 0–0.9)
EOSINOPHIL NFR BLD AUTO: 1.6 %
ERYTHROCYTE [DISTWIDTH] IN BLOOD BY AUTOMATED COUNT: 13.6 % (ref 11.5–17)
GFR SERPLBLD CREATININE-BSD FMLA CKD-EPI: 51 MLS/MIN/1.73/M2
GLOBULIN SER-MCNC: 3.7 GM/DL (ref 2.4–3.5)
GLUCOSE SERPL-MCNC: 159 MG/DL (ref 82–115)
HCT VFR BLD AUTO: 31.4 % (ref 37–47)
HGB BLD-MCNC: 10 GM/DL (ref 12–16)
IMM GRANULOCYTES # BLD AUTO: 0.02 X10(3)/MCL (ref 0–0.04)
IMM GRANULOCYTES NFR BLD AUTO: 0.5 %
LYMPHOCYTES # BLD AUTO: 0.27 X10(3)/MCL (ref 0.6–4.6)
LYMPHOCYTES NFR BLD AUTO: 6.2 %
MAGNESIUM SERPL-MCNC: 1.5 MG/DL (ref 1.6–2.6)
MCH RBC QN AUTO: 29 PG (ref 27–31)
MCHC RBC AUTO-ENTMCNC: 31.8 MG/DL (ref 33–36)
MCV RBC AUTO: 91 FL (ref 80–94)
MONOCYTES # BLD AUTO: 0.32 X10(3)/MCL (ref 0.1–1.3)
MONOCYTES NFR BLD AUTO: 7.3 %
NEUTROPHILS # BLD AUTO: 3.7 X10(3)/MCL (ref 2.1–9.2)
NEUTROPHILS NFR BLD AUTO: 84.2 %
PHOSPHATE SERPL-MCNC: 2 MG/DL (ref 2.3–4.7)
PLATELET # BLD AUTO: 218 X10(3)/MCL (ref 130–400)
PMV BLD AUTO: 10.7 FL (ref 7.4–10.4)
POTASSIUM SERPL-SCNC: 4.1 MMOL/L (ref 3.5–5.1)
PROT SERPL-MCNC: 6.2 GM/DL (ref 5.8–7.6)
RBC # BLD AUTO: 3.45 X10(6)/MCL (ref 4.2–5.4)
SODIUM SERPL-SCNC: 135 MMOL/L (ref 136–145)
WBC # SPEC AUTO: 4.4 X10(3)/MCL (ref 4.5–11.5)

## 2022-09-23 PROCEDURE — 63600175 PHARM REV CODE 636 W HCPCS: Performed by: INTERNAL MEDICINE

## 2022-09-23 PROCEDURE — 80053 COMPREHEN METABOLIC PANEL: CPT | Performed by: FAMILY MEDICINE

## 2022-09-23 PROCEDURE — 25000003 PHARM REV CODE 250: Performed by: INTERNAL MEDICINE

## 2022-09-23 PROCEDURE — 83735 ASSAY OF MAGNESIUM: CPT | Performed by: FAMILY MEDICINE

## 2022-09-23 PROCEDURE — 63600175 PHARM REV CODE 636 W HCPCS: Performed by: FAMILY MEDICINE

## 2022-09-23 PROCEDURE — 11000001 HC ACUTE MED/SURG PRIVATE ROOM

## 2022-09-23 PROCEDURE — 84100 ASSAY OF PHOSPHORUS: CPT | Performed by: FAMILY MEDICINE

## 2022-09-23 PROCEDURE — 97161 PT EVAL LOW COMPLEX 20 MIN: CPT

## 2022-09-23 PROCEDURE — 25000003 PHARM REV CODE 250: Performed by: FAMILY MEDICINE

## 2022-09-23 PROCEDURE — 36415 COLL VENOUS BLD VENIPUNCTURE: CPT | Performed by: FAMILY MEDICINE

## 2022-09-23 PROCEDURE — 99900035 HC TECH TIME PER 15 MIN (STAT)

## 2022-09-23 PROCEDURE — 85025 COMPLETE CBC W/AUTO DIFF WBC: CPT | Performed by: FAMILY MEDICINE

## 2022-09-23 PROCEDURE — 94761 N-INVAS EAR/PLS OXIMETRY MLT: CPT

## 2022-09-23 RX ORDER — CLONIDINE HYDROCHLORIDE 0.2 MG/1
0.2 TABLET ORAL 3 TIMES DAILY
Status: DISCONTINUED | OUTPATIENT
Start: 2022-09-23 | End: 2022-09-27 | Stop reason: HOSPADM

## 2022-09-23 RX ADMIN — CLOPIDOGREL 75 MG: 75 TABLET, FILM COATED ORAL at 08:09

## 2022-09-23 RX ADMIN — CEFTRIAXONE SODIUM 1 G: 1 INJECTION, POWDER, FOR SOLUTION INTRAMUSCULAR; INTRAVENOUS at 04:09

## 2022-09-23 RX ADMIN — CLONIDINE HYDROCHLORIDE 0.2 MG: 0.2 TABLET ORAL at 08:09

## 2022-09-23 RX ADMIN — ENOXAPARIN SODIUM 30 MG: 30 INJECTION SUBCUTANEOUS at 04:09

## 2022-09-23 RX ADMIN — CLONIDINE HYDROCHLORIDE 0.2 MG: 0.2 TABLET ORAL at 04:09

## 2022-09-23 RX ADMIN — KETOROLAC TROMETHAMINE 10 MG: 30 INJECTION, SOLUTION INTRAMUSCULAR; INTRAVENOUS at 04:09

## 2022-09-23 RX ADMIN — TRAZODONE HYDROCHLORIDE 100 MG: 50 TABLET ORAL at 08:09

## 2022-09-23 RX ADMIN — SODIUM CHLORIDE: 9 INJECTION, SOLUTION INTRAVENOUS at 11:09

## 2022-09-23 RX ADMIN — VALSARTAN 80 MG: 80 TABLET ORAL at 08:09

## 2022-09-23 RX ADMIN — DONEPEZIL HYDROCHLORIDE 10 MG: 5 TABLET ORAL at 08:09

## 2022-09-23 RX ADMIN — KETOROLAC TROMETHAMINE 10 MG: 30 INJECTION, SOLUTION INTRAMUSCULAR; INTRAVENOUS at 11:09

## 2022-09-23 RX ADMIN — SODIUM CHLORIDE: 9 INJECTION, SOLUTION INTRAVENOUS at 08:09

## 2022-09-23 NOTE — PT/OT/SLP EVAL
Physical Therapy Evaluation    Patient Name:  Thu Branch   MRN:  [id number]    Recommendations:     Discharge Recommendations:      Discharge Equipment Recommendations:     Barriers to discharge: None    Assessment:     Thu Branch is a 72 y.o. female admitted with a medical diagnosis of Acute cystitis.  She presents with the following impairments/functional limitations:  weakness, impaired endurance, impaired functional mobility, gait instability, impaired balance, pain.    Patient complained of severe pain in her right upper arm with any movement.  Also had complaints of severe head pain and spinning with change in position.  Mobility limited by these symptoms.    Rehab Prognosis: Good; patient would benefit from acute skilled PT services to address these deficits and reach maximum level of function.    Recent Surgery: * No surgery found *      Plan:     During this hospitalization, patient to be seen daily to address the identified rehab impairments via gait training, therapeutic activities, therapeutic exercises and progress toward the following goals:    Plan of Care Expires:  10/21/22    Subjective     Chief Complaint: Pain, weakness    Pain/Comfort:  Pain Rating 1: 7/10  Location - Side 1: Right  Location 1: arm  Pain Rating Post-Intervention 1: 9/10    Patients cultural, spiritual, Mosque conflicts given the current situation:      Living Environment:  Patient reports she lives alone.  2 weeks ago was ambulating independently with a RW and says she started falling and hit her head a few times.  Since then she has been unable to get OOB.      Objective:     Communicated with nurse prior to session.  Patient found HOB elevated with telemetry, peripheral IV, bed alarm  upon PT entry to room.    General Precautions: Standard, fall   Orthopedic Precautions:    Braces:    Respiratory Status: Room air    Exams:  RUE ROM: Deficits: minimal shoulder elevation due to pain weakness, keeps arms guarded  at side  RUE Strength: Deficits: limited by pain  LUE Strength: Deficits: 3/5  RLE Strength: Deficits: 3/5  LLE Strength: Deficits: 3/5    Functional Mobility:  Bed Mobility:     Scooting: maximal assistance  Supine to Sit: maximal assistance  Sit to Supine: maximal assistance  Transfers:     Sit to Stand:  maximal assistance with rolling walker  Balance: max A standing, mod A sitting.  Unable to take steps      AM-PAC 6 CLICK MOBILITY  Total Score:      Patient left HOB elevated with call button in reach and bed alarm on.    GOALS:   Multidisciplinary Problems       Physical Therapy Goals          Problem: Physical Therapy    Goal Priority Disciplines Outcome Goal Variances Interventions   Physical Therapy Goal     PT, PT/OT Ongoing, Progressing     Description: Goals to be met by: 10/21/2022     Patient will increase functional independence with mobility by performin. Supine to sit with Modified [place]  2. Sit to stand transfer with Modified [place]  3. Gait  x 150 feet with Modified [place] using Rolling Walker.                          History:     History reviewed. No pertinent past medical history.    History reviewed. No pertinent surgical history.    Time Tracking:     PT Received On: 22  PT Start Time: 1005     PT Stop Time: 1025  PT Total Time (min): 20 min     Billable Minutes: Evaluation 20      2022

## 2022-09-23 NOTE — PSYCH
I attempted psych eval for level II.  I spoke with the nurse and she reports that the patient said that she did not want to do it tonight.  She asked that it be done tomorrow.

## 2022-09-24 PROCEDURE — 63600175 PHARM REV CODE 636 W HCPCS: Performed by: FAMILY MEDICINE

## 2022-09-24 PROCEDURE — 94660 CPAP INITIATION&MGMT: CPT

## 2022-09-24 PROCEDURE — 25000003 PHARM REV CODE 250: Performed by: FAMILY MEDICINE

## 2022-09-24 PROCEDURE — 25000003 PHARM REV CODE 250: Performed by: INTERNAL MEDICINE

## 2022-09-24 PROCEDURE — 94761 N-INVAS EAR/PLS OXIMETRY MLT: CPT

## 2022-09-24 PROCEDURE — 63600175 PHARM REV CODE 636 W HCPCS: Performed by: INTERNAL MEDICINE

## 2022-09-24 PROCEDURE — 11000001 HC ACUTE MED/SURG PRIVATE ROOM

## 2022-09-24 PROCEDURE — 99900035 HC TECH TIME PER 15 MIN (STAT)

## 2022-09-24 PROCEDURE — 97530 THERAPEUTIC ACTIVITIES: CPT

## 2022-09-24 RX ORDER — LANOLIN ALCOHOL/MO/W.PET/CERES
1 CREAM (GRAM) TOPICAL 2 TIMES DAILY
Status: DISCONTINUED | OUTPATIENT
Start: 2022-09-24 | End: 2022-09-27 | Stop reason: HOSPADM

## 2022-09-24 RX ORDER — MAGNESIUM SULFATE HEPTAHYDRATE 40 MG/ML
4 INJECTION, SOLUTION INTRAVENOUS ONCE
Status: COMPLETED | OUTPATIENT
Start: 2022-09-24 | End: 2022-09-24

## 2022-09-24 RX ADMIN — FERROUS SULFATE TAB 325 MG (65 MG ELEMENTAL FE) 1 EACH: 325 (65 FE) TAB at 09:09

## 2022-09-24 RX ADMIN — ASCORBIC ACID, THIAMINE MONONITRATE,RIBOFLAVIN, NIACINAMIDE, PYRIDOXINE HYDROCHLORIDE, FOLIC ACID, CYANOCOBALAMIN, BIOTIN, CALCIUM PANTOTHENATE, 1 CAPSULE: 100; 1.5; 1.7; 20; 10; 1; 6000; 150000; 5 CAPSULE, LIQUID FILLED ORAL at 03:09

## 2022-09-24 RX ADMIN — CLOPIDOGREL 75 MG: 75 TABLET, FILM COATED ORAL at 08:09

## 2022-09-24 RX ADMIN — CLONIDINE HYDROCHLORIDE 0.2 MG: 0.2 TABLET ORAL at 03:09

## 2022-09-24 RX ADMIN — SODIUM CHLORIDE: 9 INJECTION, SOLUTION INTRAVENOUS at 08:09

## 2022-09-24 RX ADMIN — CEFTRIAXONE SODIUM 1 G: 1 INJECTION, POWDER, FOR SOLUTION INTRAMUSCULAR; INTRAVENOUS at 05:09

## 2022-09-24 RX ADMIN — DONEPEZIL HYDROCHLORIDE 10 MG: 5 TABLET ORAL at 08:09

## 2022-09-24 RX ADMIN — KETOROLAC TROMETHAMINE 10 MG: 30 INJECTION, SOLUTION INTRAMUSCULAR; INTRAVENOUS at 10:09

## 2022-09-24 RX ADMIN — CLONIDINE HYDROCHLORIDE 0.2 MG: 0.2 TABLET ORAL at 09:09

## 2022-09-24 RX ADMIN — CLONIDINE HYDROCHLORIDE 0.2 MG: 0.2 TABLET ORAL at 08:09

## 2022-09-24 RX ADMIN — TRAZODONE HYDROCHLORIDE 100 MG: 50 TABLET ORAL at 09:09

## 2022-09-24 RX ADMIN — MAGNESIUM SULFATE HEPTAHYDRATE 4 G: 2 INJECTION, SOLUTION INTRAVENOUS at 03:09

## 2022-09-24 RX ADMIN — VALSARTAN 80 MG: 80 TABLET ORAL at 08:09

## 2022-09-24 RX ADMIN — ENOXAPARIN SODIUM 30 MG: 30 INJECTION SUBCUTANEOUS at 05:09

## 2022-09-24 NOTE — PT/OT/SLP PROGRESS
Physical Therapy Treatment    Patient Name:  Thu Branch   MRN:  [id number]    Recommendations:     Discharge Recommendations:      Discharge Equipment Recommendations:     Barriers to discharge: Decreased caregiver support    Assessment:     Thu Branch is a 72 y.o. female admitted with a medical diagnosis of Acute cystitis.  She presents with the following impairments/functional limitations:  weakness, impaired endurance, impaired functional mobility, gait instability, impaired balance Patient slow to mobilize, c/o discomfort/pain in R forearm with mobility, reluctant to use R arm with mobility. Patient able to stand and sidestep today with improvement, but requires encouragement.    Rehab Prognosis: Fair; patient would benefit from acute skilled PT services to address these deficits and reach maximum level of function.    Recent Surgery: * No surgery found *      Plan:     During this hospitalization, patient to be seen daily to address the identified rehab impairments via therapeutic activities, therapeutic exercises, gait training and progress toward the following goals:    Plan of Care Expires:  10/21/22    Subjective     Chief Complaint: Patient reports wanting to get up and stand , but unsure if she can use her R arm.  Patient/Family Comments/goals: to get strong and go home.  Pain/Comfort:  Pain Rating 1: 0/10      Objective:     Communicated with nurse prior to session.  Patient found HOB elevated with peripheral IV, PureWick upon PT entry to room.     General Precautions: Standard, fall   Orthopedic Precautions:    Braces:    Respiratory Status: Room air     Functional Mobility:  Bed Mobility:     Supine to Sit: moderate assistance  Sit to Supine: maximal assistance  Transfers:     Sit to Stand:  Mod x 2 with cuing for wt shift over DEYANIRA and assistance with placement of RUE  with rolling walker  Balance: sitting EOB with fair balance with UE support to complete BLE AROM x 10 reps, standing with  UE support yet flexed posture and R lean , sidestepping with UE support on RW x 2 steps      AM-PAC 6 CLICK MOBILITY          Therapeutic Activities and Exercises:   As stated above    Patient left HOB elevated with all lines intact, call button in reach, and nurse notified..    GOALS:   Multidisciplinary Problems       Physical Therapy Goals          Problem: Physical Therapy    Goal Priority Disciplines Outcome Goal Variances Interventions   Physical Therapy Goal     PT, PT/OT Ongoing, Progressing     Description: Goals to be met by: 10/21/2022     Patient will increase functional independence with mobility by performin. Supine to sit with Modified [place]  2. Sit to stand transfer with Modified [place]  3. Gait  x 150 feet with Modified [place] using Rolling Walker.                          Time Tracking:     PT Received On:    PT Start Time: 1035     PT Stop Time: 1055  PT Total Time (min): 20 min     Billable Minutes: Therapeutic Activity 20    Treatment Type: Treatment  PT/PTA: PT           2022

## 2022-09-25 PROBLEM — R82.5 POSITIVE URINE DRUG SCREEN: Status: ACTIVE | Noted: 2022-09-25

## 2022-09-25 PROBLEM — R29.6 RECURRENT FALLS: Status: ACTIVE | Noted: 2022-09-25

## 2022-09-25 PROBLEM — D64.9 ANEMIA: Status: ACTIVE | Noted: 2022-09-25

## 2022-09-25 PROBLEM — E66.9 OBESITY: Status: ACTIVE | Noted: 2022-09-25

## 2022-09-25 PROBLEM — E44.0 MALNUTRITION OF MODERATE DEGREE: Status: ACTIVE | Noted: 2022-09-25

## 2022-09-25 PROBLEM — D72.819 LEUKOCYTOPENIA: Status: ACTIVE | Noted: 2022-09-25

## 2022-09-25 PROBLEM — E86.0 DEHYDRATION: Status: ACTIVE | Noted: 2022-09-25

## 2022-09-25 LAB
ALBUMIN SERPL-MCNC: 2.2 GM/DL (ref 3.4–4.8)
ALBUMIN/GLOB SERPL: 0.6 RATIO (ref 1.1–2)
ALP SERPL-CCNC: 32 UNIT/L (ref 40–150)
ALT SERPL-CCNC: 20 UNIT/L (ref 0–55)
AST SERPL-CCNC: 18 UNIT/L (ref 5–34)
BASOPHILS # BLD AUTO: 0.02 X10(3)/MCL (ref 0–0.2)
BASOPHILS NFR BLD AUTO: 0.6 %
BILIRUBIN DIRECT+TOT PNL SERPL-MCNC: 0.1 MG/DL
BUN SERPL-MCNC: 29 MG/DL (ref 9.8–20.1)
CALCIUM SERPL-MCNC: 10.3 MG/DL (ref 8.4–10.2)
CHLORIDE SERPL-SCNC: 111 MMOL/L (ref 98–107)
CO2 SERPL-SCNC: 21 MMOL/L (ref 23–31)
CREAT SERPL-MCNC: 1.19 MG/DL (ref 0.55–1.02)
EOSINOPHIL # BLD AUTO: 0.25 X10(3)/MCL (ref 0–0.9)
EOSINOPHIL NFR BLD AUTO: 7.3 %
ERYTHROCYTE [DISTWIDTH] IN BLOOD BY AUTOMATED COUNT: 13.6 % (ref 11.5–17)
GFR SERPLBLD CREATININE-BSD FMLA CKD-EPI: 49 MLS/MIN/1.73/M2
GLOBULIN SER-MCNC: 3.4 GM/DL (ref 2.4–3.5)
GLUCOSE SERPL-MCNC: 118 MG/DL (ref 82–115)
HCT VFR BLD AUTO: 27.5 % (ref 37–47)
HGB BLD-MCNC: 8.4 GM/DL (ref 12–16)
IMM GRANULOCYTES # BLD AUTO: 0.02 X10(3)/MCL (ref 0–0.04)
IMM GRANULOCYTES NFR BLD AUTO: 0.6 %
LYMPHOCYTES # BLD AUTO: 0.78 X10(3)/MCL (ref 0.6–4.6)
LYMPHOCYTES NFR BLD AUTO: 22.7 %
MAGNESIUM SERPL-MCNC: 2.4 MG/DL (ref 1.6–2.6)
MCH RBC QN AUTO: 28.2 PG (ref 27–31)
MCHC RBC AUTO-ENTMCNC: 30.5 MG/DL (ref 33–36)
MCV RBC AUTO: 92.3 FL (ref 80–94)
MONOCYTES # BLD AUTO: 0.48 X10(3)/MCL (ref 0.1–1.3)
MONOCYTES NFR BLD AUTO: 14 %
NEUTROPHILS # BLD AUTO: 1.9 X10(3)/MCL (ref 2.1–9.2)
NEUTROPHILS NFR BLD AUTO: 54.8 %
PHOSPHATE SERPL-MCNC: 3.9 MG/DL (ref 2.3–4.7)
PLATELET # BLD AUTO: 241 X10(3)/MCL (ref 130–400)
PMV BLD AUTO: 10.8 FL (ref 7.4–10.4)
POTASSIUM SERPL-SCNC: 4.9 MMOL/L (ref 3.5–5.1)
PROT SERPL-MCNC: 5.6 GM/DL (ref 5.8–7.6)
RBC # BLD AUTO: 2.98 X10(6)/MCL (ref 4.2–5.4)
SODIUM SERPL-SCNC: 137 MMOL/L (ref 136–145)
WBC # SPEC AUTO: 3.4 X10(3)/MCL (ref 4.5–11.5)

## 2022-09-25 PROCEDURE — 85025 COMPLETE CBC W/AUTO DIFF WBC: CPT | Performed by: FAMILY MEDICINE

## 2022-09-25 PROCEDURE — 97530 THERAPEUTIC ACTIVITIES: CPT

## 2022-09-25 PROCEDURE — 84100 ASSAY OF PHOSPHORUS: CPT | Performed by: FAMILY MEDICINE

## 2022-09-25 PROCEDURE — 99900035 HC TECH TIME PER 15 MIN (STAT)

## 2022-09-25 PROCEDURE — 80053 COMPREHEN METABOLIC PANEL: CPT | Performed by: FAMILY MEDICINE

## 2022-09-25 PROCEDURE — 25000003 PHARM REV CODE 250: Performed by: INTERNAL MEDICINE

## 2022-09-25 PROCEDURE — 36415 COLL VENOUS BLD VENIPUNCTURE: CPT | Performed by: FAMILY MEDICINE

## 2022-09-25 PROCEDURE — 94660 CPAP INITIATION&MGMT: CPT

## 2022-09-25 PROCEDURE — 94761 N-INVAS EAR/PLS OXIMETRY MLT: CPT

## 2022-09-25 PROCEDURE — 11000001 HC ACUTE MED/SURG PRIVATE ROOM

## 2022-09-25 PROCEDURE — 63600175 PHARM REV CODE 636 W HCPCS: Performed by: FAMILY MEDICINE

## 2022-09-25 PROCEDURE — 63600175 PHARM REV CODE 636 W HCPCS: Performed by: INTERNAL MEDICINE

## 2022-09-25 PROCEDURE — 25000003 PHARM REV CODE 250: Performed by: FAMILY MEDICINE

## 2022-09-25 PROCEDURE — 83735 ASSAY OF MAGNESIUM: CPT | Performed by: FAMILY MEDICINE

## 2022-09-25 RX ORDER — ACETAMINOPHEN 500 MG
500 TABLET ORAL EVERY 4 HOURS PRN
Status: DISCONTINUED | OUTPATIENT
Start: 2022-09-25 | End: 2022-09-27 | Stop reason: HOSPADM

## 2022-09-25 RX ORDER — MEMANTINE HYDROCHLORIDE 5 MG/1
5 TABLET ORAL 2 TIMES DAILY
Status: DISCONTINUED | OUTPATIENT
Start: 2022-09-25 | End: 2022-09-27 | Stop reason: HOSPADM

## 2022-09-25 RX ORDER — DONEPEZIL HYDROCHLORIDE 5 MG/1
5 TABLET, FILM COATED ORAL DAILY
Status: DISCONTINUED | OUTPATIENT
Start: 2022-09-26 | End: 2022-09-27 | Stop reason: HOSPADM

## 2022-09-25 RX ADMIN — CLONIDINE HYDROCHLORIDE 0.2 MG: 0.2 TABLET ORAL at 09:09

## 2022-09-25 RX ADMIN — MEMANTINE 5 MG: 5 TABLET ORAL at 12:09

## 2022-09-25 RX ADMIN — FERROUS SULFATE TAB 325 MG (65 MG ELEMENTAL FE) 1 EACH: 325 (65 FE) TAB at 08:09

## 2022-09-25 RX ADMIN — ENOXAPARIN SODIUM 30 MG: 30 INJECTION SUBCUTANEOUS at 04:09

## 2022-09-25 RX ADMIN — FERROUS SULFATE TAB 325 MG (65 MG ELEMENTAL FE) 1 EACH: 325 (65 FE) TAB at 09:09

## 2022-09-25 RX ADMIN — ACETAMINOPHEN 500 MG: 500 TABLET, FILM COATED ORAL at 05:09

## 2022-09-25 RX ADMIN — CLOPIDOGREL 75 MG: 75 TABLET, FILM COATED ORAL at 08:09

## 2022-09-25 RX ADMIN — MEMANTINE 5 MG: 5 TABLET ORAL at 09:09

## 2022-09-25 RX ADMIN — ASCORBIC ACID, THIAMINE MONONITRATE,RIBOFLAVIN, NIACINAMIDE, PYRIDOXINE HYDROCHLORIDE, FOLIC ACID, CYANOCOBALAMIN, BIOTIN, CALCIUM PANTOTHENATE, 1 CAPSULE: 100; 1.5; 1.7; 20; 10; 1; 6000; 150000; 5 CAPSULE, LIQUID FILLED ORAL at 08:09

## 2022-09-25 RX ADMIN — TRAZODONE HYDROCHLORIDE 100 MG: 50 TABLET ORAL at 09:09

## 2022-09-25 RX ADMIN — CEFTRIAXONE SODIUM 1 G: 1 INJECTION, POWDER, FOR SOLUTION INTRAMUSCULAR; INTRAVENOUS at 03:09

## 2022-09-25 RX ADMIN — DONEPEZIL HYDROCHLORIDE 10 MG: 5 TABLET ORAL at 08:09

## 2022-09-25 RX ADMIN — VALSARTAN 80 MG: 80 TABLET ORAL at 08:09

## 2022-09-25 RX ADMIN — CLONIDINE HYDROCHLORIDE 0.2 MG: 0.2 TABLET ORAL at 08:09

## 2022-09-25 NOTE — PROGRESS NOTES
[facility]                     [address] [facility] [address]    PATIENT NAME:       JONO MATUTE  YOB: 1950  CSN:                [id number]   MRN:                [id number]  ADMIT DATE:         09/21/2022 13:49:00  PHYSICIAN:          Joseph Vides MD                            PROGRESS NOTE    DATE:  09/24/2022 00:00:00    She remains in the hospital secondary to her UTI.  She is on Rocephin, cultures   pending.  Her urine drug screen was positive for fentanyl and MDMS.  She also   has anemia and a low magnesium.  She has a low iron level and a moderately low   B12 level.  She has no new complaints.  She continues with her neck pain and low   back pain.    PHYSICAL EXAMINATION:  VITAL SIGNS:  She is afebrile.  Vital signs are stable with a labile blood   pressure.  Pulse ox is 98% on room air.    HEART:  Regular rate and rhythm.    LUNGS:  Clear to auscultation bilaterally.    ABDOMEN:  Nontender, nondistended.  Normoactive bowel sounds.   EXTREMITIES:  No significant edema.    MUSCULOSKELETAL:  Her neck and back are as before with tenderness.    LABS, X-RAYS, MEDICATIONS:  On the chart and reviewed.    ASSESSMENT:    1. Urinary tract infection.  2. Positive drug screen with altered mental status on admit.  3. Iron deficiency anemia with moderately low B12.  4. Severe debility with known disk disease and neck and back pain.  5. Anemia.  6. Low white blood cell count.  7. Magnesium is low.    8. Multiple other problems as per problem list.    PLAN:    1. Continue current care.    2. Replace magnesium.    3. Give B complex vitamin and also iron pills.    4. Recheck a.m. labs.    5. Continue physical therapy.    6. If we cannot get her to LTAC, then will go to the nursing home.  She is   requesting [facility].        ______________________________  Joseph Vides MD    PBS/AQS  DD:  09/25/2022  Time:   01:16PM  DT:  09/25/2022  Time:  01:35PM  Job #:  [id number]      PROGRESS NOTE

## 2022-09-25 NOTE — H&P
[facility]                     [address] [facility] [address]    PATIENT NAME:       THU BRANCH  YOB: 1950  CSN:                [id number]   MRN:                [id number]  ADMIT DATE:         09/21/2022 13:49:00  PHYSICIAN:          Joseph Vides MD                        HISTORY AND PHYSICAL      CHIEF COMPLAINT AND HISTORY OF PRESENT ILLNESS:  Ms. Thu Branch is a   patient of mine, whom we saw a couple weeks ago with debility at home.  She is   very weak.  We ordered physical therapy and home health care at that time.    Unfortunately, she is still falling.  She went to the emergency room yesterday   and was evaluated for right shoulder pain and anterior right chest pain.  X-ray   showed no fracture and she was discharged back to home this morning.  She has   been falling again, and went to the emergency room this afternoon, and she is   being admitted.  Her son and daughter-in-law came by the office, they are very   concerned.  She apparently has a urinary tract infection, but more importantly,   she has a positive urine drug screen for fentanyl and MDMS.  We are not sure   where she is getting this.  The police have been notified and are involved.  She   currently complains of generalized pain, and neck and low back pain, which is   chronic.    PAST MEDICAL HISTORY:  Significant for anemia, diabetes mellitus type 2, vitamin   D deficiency, hyperlipidemia, depression, anxiety, insomnia, obstructive sleep   apnea, not using her CPAP machine; polyneuropathy, glaucoma, mitral valve   disorders, hypertension, coronary artery disease, carotid artery stenosis,   previous cerebral aneurysm, corrected with a coil placement; peripheral vascular   disease, esophagitis, gastritis without hemorrhage, hiatal hernia, chronic   vascular disorder of the intestine, diverticulosis of the colon, slow transit    constipation, rosacea, osteoarthritis, hematuria, chronic kidney disease stage   3B, vertigo, multiple falls at home, statin intolerance, severe debility.    PAST SURGICAL HISTORY:  Includes cholecystectomy per Dr. Jo, colonoscopy, EGD,   humeral surgery with open reduction, internal fixation of the left humeral shaft   fracture, 2018; partial hysterectomy, repair of intracranial aneurysm, as   stated above; shoulder surgery for repair of the left shoulder rotator cuff in   2018, right shoulder surgery subsequent to that; CABG x2 in 2021, EGD with   biopsy and ulcers at that time, 2022; carotid endarterectomy.  She also has   known diverticulosis and grade 2 hemorrhoids by colonoscopy.    FAMILY MEDICAL HISTORY:  Father had COPD, myocardial infarction in the past.    Mother had heart disease.    SOCIAL HISTORY:  She is a former smoker, she quit in 2005.  Denies drug use.    Denies alcohol.    IMMUNIZATIONS:  Not up to date.    CODE STATUS:  Full currently.    PHYSICAL EXAMINATION:  VITAL SIGNS:  Stable.  Blood pressure is labile.    GENERAL:  She is lying in bed, in no acute distress.  She is alert and oriented   x3, somewhat tired however.    HEENT:  Examination is grossly within normal limits.    NECK:  Tender posteriorly, with decreased range of motion.    HEART:  Regular rate and rhythm.   LUNGS:  Clear to auscultation bilaterally.    ABDOMEN:  Nontender, soft.  EXTREMITIES:  No significant edema.  She has a right shoulder pain and also low   back pain.    NEUROLOGIC:  She moves all 4 extremities and she is alert and oriented x4.    GENITOURINARY:  Examination is deferred at this time.  RECTAL:  Examination is deferred at this time.  BREASTS:  Examination is deferred at this time.    ASSESSMENT:    1. Substance abuse, with positive urine drug screen for fentanyl and MDMS.  2. Recurrent falls at home, with significant weakness.  3. Urinary tract infection, with hematuria.  4. Right anterior wall chest pain and  right shoulder pain, secondary to a fall.  5. Neck and low back pain.  6. History of depression and anxiety.  7. History of dementia.    PLAN:  Give antibiotics and try to keep her off narcotics.  Consult Physical   Therapy and try to get her to LTAC.  Follow up cultures.  Follow up labs in the   morning.  Control blood pressure.    ______________________________  Joseph Vides MD    PBS/AQS  DD:  09/25/2022  Time:  01:43PM  DT:  09/25/2022  Time:  02:17PM  Job #:  [id number]      HISTORY AND PHYSICAL

## 2022-09-25 NOTE — PROGRESS NOTES
[facility]                     [address] [facility] [address]    PATIENT NAME:       JONO MATUTE  YOB: 1950  CSN:                [id number]   MRN:                [id number]  ADMIT DATE:         09/21/2022 13:49:00  PHYSICIAN:          Joseph Vides MD                            PROGRESS NOTE    DATE:  09/22/2022 00:00:00    SUBJECTIVE:  She was admitted to [facility] yesterday with altered   mental status and positive urine drug screen for fentanyl and MDMS.  She is not   sure how she has been getting this.  She is unable to even stand essentially.  I   did discuss the case with her son and [employer] is working on this.    PHYSICAL EXAMINATION:  VITAL SIGNS:  The patient's vitals are stable.  She is afebrile.    HEART:  Regular rate and rhythm.    LUNGS:  Clear to auscultation bilaterally.    ABDOMEN:  Nontender, nondistended.  Normoactive bowel sounds.    EXTREMITIES:  No significant edema.    LABS, X-RAYS, MEDICATIONS:  On the chart and reviewed.    ASSESSMENT:    1. Multiple falls at home with acute pain of her right shoulder on admit, but   now she is complaining of pain all over, primarily of her neck and back, which   is chronic for her.  We are trying to keep her off narcotics because of her   positive drug screen, and we are not sure where this came from.  2. Severe debility, as stated above.  3. Urinary tract infection.  4. Hydrocephalus seen on CT.  Previously worked up outpatient.  5. Anemia.  6. Dehydration.  7. Hypertension, currently better controlled than on admit.  8. Multiple other medical problems as per problem list.    PLAN:  Continue to control blood pressure and try to get her to LTAC versus   nursing home since she is unable to take care of herself at home, with frequent   falls.  Consult Physical Therapy.  Continue antibiotics.  I gave her Ketorolac   for her  pain.        ______________________________  MD ROBBIN Zazueta/HUMAIRA  DD:  09/25/2022  Time:  01:31PM  DT:  09/25/2022  Time:  01:45PM  Job #:  [id number]      PROGRESS NOTE

## 2022-09-25 NOTE — PROGRESS NOTES
[facility]                     [address] [facility] [address]    PATIENT NAME:       JONO MATUTE  YOB: 1950  CSN:                [id number]   MRN:                [id number]  ADMIT DATE:         09/21/2022 13:49:00  PHYSICIAN:          Joseph Vides MD                            PROGRESS NOTE    DATE:  09/23/2022 00:00:00    SUBJECTIVE:  She was admitted on the 21st, secondary to altered mental status.    Her urine drug screen was positive MDMS and fentanyl.  She has had some problems   with her blood pressure.  She continues to complain of generalized pain   primarily of her neck and lower back from her known disk disease.  She is lying   in bed, in no acute distress.    Labs, x-rays, and medications are on the chart and reviewed.    PHYSICAL EXAMINATION:  VITAL SIGNS:  Reveal a blood pressure of 176/79, pulse oximetry 99% on room air.    She is afebrile.    HEART:  Regular rate and rhythm.  LUNGS:  Clear to auscultation bilaterally.  ABDOMEN:  Obese, nontender, soft.  EXTREMITIES:  No significant edema.    NEUROLOGIC:  She is alert and oriented x3.  Moves all 4 extremities, but is very   weak, unable to stand on her own.    ASSESSMENT:    1. Positive urine drug screen for fentanyl and MDMS.  She is not sure how she   got this.  2. History of depression and anxiety, currently stable on Cymbalta.  3. Severe debility, fall precaution, with multiple falls at home.  4. Labile blood pressure.  5. Urinary tract infection.  6. Anemia.  7. Multiple other medical problems as per problem list.    PLAN:  Continue current care.  Start clonidine to help control blood pressure.    PT consult, and we will try to get her to LTAC versus nursing home.  Obviously,   she is unable to care for herself at home at this time.        ______________________________  Joseph Vides MD    PBS/AQS  DD:  09/25/2022  Time:   01:23PM  DT:  09/25/2022  Time:  01:34PM  Job #:  [id number]      PROGRESS NOTE

## 2022-09-25 NOTE — PT/OT/SLP PROGRESS
Physical Therapy Treatment    Patient Name:  Thu Branch   MRN:  [id number]    Recommendations:     Discharge Recommendations:  nursing facility, skilled   Discharge Equipment Recommendations:     Barriers to discharge: None    Assessment:     Thu Branch is a 72 y.o. female admitted with a medical diagnosis of Acute cystitis.  She presents with the following impairments/functional limitations:  weakness, impaired endurance, impaired functional mobility, gait instability, impaired balance, decreased safety awareness . Patient with increased assistance needed to transfer to standing, poor posture, leaning to R side more in sitting EOB and standing, difficulty to correct, unsure if patient was giving max effort.    Rehab Prognosis: Fair; patient would benefit from acute skilled PT services to address these deficits and reach maximum level of function.    Recent Surgery: * No surgery found *      Plan:     During this hospitalization, patient to be seen daily to address the identified rehab impairments via gait training, therapeutic activities, therapeutic exercises and progress toward the following goals:    Plan of Care Expires:  10/21/22    Subjective     Chief Complaint: Patient reports having a bad night.  Patient/Family Comments/goals: to get strong  Pain/Comfort:  Pain Rating 1: 5/10  Location - Side 1: Right  Location 1: arm  Pain Addressed 1: Reposition      Objective:     Communicated with nurse prior to session.  Patient found HOB elevated with PureWick upon PT entry to room.     General Precautions: Standard, fall   Orthopedic Precautions:    Braces:    Respiratory Status: Room air     Functional Mobility:  Bed Mobility:     Rolling Left:  moderate assistance  Rolling Right: moderate assistance  Supine to Sit: maximal assistance  Sit to Supine: maximal assistance  Transfers:     Sit to Stand:  moderate assistance and of 2 persons with rolling walker  Balance: sitting balance initally : Fair/Poor  + with fatigue: Poor, standing : Poor/Poor - , pt with difficulty adjusting posture with verbal cuing only.      AM-PAC 6 CLICK MOBILITY          Therapeutic Activities and Exercises:   As above, in addition, sitting EOB x 6 minutes to complete BLE AROM x 10 reps in available planes     Patient left HOB elevated with all lines intact, call button in reach, and bed alarm on..    GOALS:   Multidisciplinary Problems       Physical Therapy Goals          Problem: Physical Therapy    Goal Priority Disciplines Outcome Goal Variances Interventions   Physical Therapy Goal     PT, PT/OT Ongoing, Progressing     Description: Goals to be met by: 10/21/2022     Patient will increase functional independence with mobility by performin. Supine to sit with Modified McKean  2. Sit to stand transfer with Modified McKean  3. Gait  x 150 feet with Modified McKean using Rolling Walker.                          Time Tracking:     PT Received On:    PT Start Time: 1000     PT Stop Time: 1020  PT Total Time (min): 20 min     Billable Minutes: Therapeutic Activity 20    Treatment Type: Treatment  PT/PTA: PT           2022

## 2022-09-25 NOTE — PROGRESS NOTES
[facility]                     [address] [facility] [address]    PATIENT NAME:       JONO BRANCH  YOB: 1950  CSN:                [id number]   MRN:                [id number]  ADMIT DATE:         09/21/2022 13:49:00  PHYSICIAN:          Joseph Vides MD                            PROGRESS NOTE    DATE:  09/25/2022 00:00:00    Ms. Branch remains in the hospital following admission secondary to mental   status changes.  She had a positive drug screen, and she does not know how that   happened.  Today she denies being depressed as long she takes her medication.    She remains with low back and neck pain.  She also has a UTI, which we are   treating.    PHYSICAL EXAMINATION:  VITAL SIGNS:  She is afebrile.  Pulse is 55 beats per minute, blood pressure   144/64, pulse ox 99% on room air.    HEART:  Irregular rhythm with mild bradycardia.    LUNGS:  Clear to auscultation bilaterally.  No respiratory distress.  ABDOMEN:  Soft, nontender.   EXTREMITIES:  No significant edema.    NECK:  Diminished range of motion with pain posteriorly.    MUSCULOSKELETAL:  Lower back was not examined secondary to her sitting up in   bed.    LABS, X-RAYS, MEDICATIONS:  On the chart and reviewed.    ASSESSMENT:    1. Positive urine drug screen.  2. History of depression and anxiety, currently stable.  3. Malnutrition.  4. Dehydration on admit.  5. Anemia with iron deficiency.  6. Leukocytopenia.  7. Underlying dementia, currently stable.  8. Multiple other medical problems as per problem list.    PLAN:    1. Recheck labs in the morning.    2. Continue antibiotics.  Culture was reviewed and is on the chart.    3. Encourage good nutrition.  4. Will discuss nursing home placement if not LTAC placement with the case   manager tomorrow.        ______________________________  Joseph Vides MD    PBS/AQS  DD:  09/25/2022  Time:   01:08PM  DT:  09/25/2022  Time:  01:22PM  Job #:  [id number]      PROGRESS NOTE

## 2022-09-26 LAB
ALBUMIN SERPL-MCNC: 2.2 GM/DL (ref 3.4–4.8)
ALBUMIN/GLOB SERPL: 0.7 RATIO (ref 1.1–2)
ALP SERPL-CCNC: 39 UNIT/L (ref 40–150)
ALT SERPL-CCNC: 36 UNIT/L (ref 0–55)
AST SERPL-CCNC: 35 UNIT/L (ref 5–34)
BASOPHILS # BLD AUTO: 0.05 X10(3)/MCL (ref 0–0.2)
BASOPHILS NFR BLD AUTO: 1.2 %
BILIRUBIN DIRECT+TOT PNL SERPL-MCNC: 0.2 MG/DL
BUN SERPL-MCNC: 33 MG/DL (ref 9.8–20.1)
CALCIUM SERPL-MCNC: 10.4 MG/DL (ref 8.4–10.2)
CHLORIDE SERPL-SCNC: 108 MMOL/L (ref 98–107)
CO2 SERPL-SCNC: 20 MMOL/L (ref 23–31)
CREAT SERPL-MCNC: 1.27 MG/DL (ref 0.55–1.02)
EOSINOPHIL # BLD AUTO: 0.25 X10(3)/MCL (ref 0–0.9)
EOSINOPHIL NFR BLD AUTO: 5.8 %
ERYTHROCYTE [DISTWIDTH] IN BLOOD BY AUTOMATED COUNT: 13.7 % (ref 11.5–17)
GFR SERPLBLD CREATININE-BSD FMLA CKD-EPI: 46 MLS/MIN/1.73/M2
GLOBULIN SER-MCNC: 3.3 GM/DL (ref 2.4–3.5)
GLUCOSE SERPL-MCNC: 143 MG/DL (ref 82–115)
HCT VFR BLD AUTO: 27.4 % (ref 37–47)
HGB BLD-MCNC: 8.5 GM/DL (ref 12–16)
IMM GRANULOCYTES # BLD AUTO: 0.05 X10(3)/MCL (ref 0–0.04)
IMM GRANULOCYTES NFR BLD AUTO: 1.2 %
LYMPHOCYTES # BLD AUTO: 1.14 X10(3)/MCL (ref 0.6–4.6)
LYMPHOCYTES NFR BLD AUTO: 26.5 %
MAGNESIUM SERPL-MCNC: 1.9 MG/DL (ref 1.6–2.6)
MCH RBC QN AUTO: 28.9 PG (ref 27–31)
MCHC RBC AUTO-ENTMCNC: 31 MG/DL (ref 33–36)
MCV RBC AUTO: 93.2 FL (ref 80–94)
MONOCYTES # BLD AUTO: 0.52 X10(3)/MCL (ref 0.1–1.3)
MONOCYTES NFR BLD AUTO: 12.1 %
NEUTROPHILS # BLD AUTO: 2.3 X10(3)/MCL (ref 2.1–9.2)
NEUTROPHILS NFR BLD AUTO: 53.2 %
PHOSPHATE SERPL-MCNC: 4.2 MG/DL (ref 2.3–4.7)
PLATELET # BLD AUTO: 267 X10(3)/MCL (ref 130–400)
PMV BLD AUTO: 11.1 FL (ref 7.4–10.4)
POTASSIUM SERPL-SCNC: 5 MMOL/L (ref 3.5–5.1)
PROT SERPL-MCNC: 5.5 GM/DL (ref 5.8–7.6)
RBC # BLD AUTO: 2.94 X10(6)/MCL (ref 4.2–5.4)
SODIUM SERPL-SCNC: 135 MMOL/L (ref 136–145)
WBC # SPEC AUTO: 4.3 X10(3)/MCL (ref 4.5–11.5)

## 2022-09-26 PROCEDURE — 83735 ASSAY OF MAGNESIUM: CPT | Performed by: FAMILY MEDICINE

## 2022-09-26 PROCEDURE — 36415 COLL VENOUS BLD VENIPUNCTURE: CPT | Performed by: FAMILY MEDICINE

## 2022-09-26 PROCEDURE — 80053 COMPREHEN METABOLIC PANEL: CPT | Performed by: FAMILY MEDICINE

## 2022-09-26 PROCEDURE — 63600175 PHARM REV CODE 636 W HCPCS: Performed by: FAMILY MEDICINE

## 2022-09-26 PROCEDURE — 84100 ASSAY OF PHOSPHORUS: CPT | Performed by: FAMILY MEDICINE

## 2022-09-26 PROCEDURE — 63600175 PHARM REV CODE 636 W HCPCS: Performed by: INTERNAL MEDICINE

## 2022-09-26 PROCEDURE — 94761 N-INVAS EAR/PLS OXIMETRY MLT: CPT

## 2022-09-26 PROCEDURE — 99900035 HC TECH TIME PER 15 MIN (STAT)

## 2022-09-26 PROCEDURE — 25000003 PHARM REV CODE 250: Performed by: FAMILY MEDICINE

## 2022-09-26 PROCEDURE — 25000003 PHARM REV CODE 250: Performed by: INTERNAL MEDICINE

## 2022-09-26 PROCEDURE — 85025 COMPLETE CBC W/AUTO DIFF WBC: CPT | Performed by: FAMILY MEDICINE

## 2022-09-26 PROCEDURE — 97530 THERAPEUTIC ACTIVITIES: CPT

## 2022-09-26 PROCEDURE — 94660 CPAP INITIATION&MGMT: CPT

## 2022-09-26 PROCEDURE — 11000001 HC ACUTE MED/SURG PRIVATE ROOM

## 2022-09-26 RX ADMIN — VALSARTAN 80 MG: 80 TABLET ORAL at 09:09

## 2022-09-26 RX ADMIN — MEMANTINE 5 MG: 5 TABLET ORAL at 09:09

## 2022-09-26 RX ADMIN — DONEPEZIL HYDROCHLORIDE 5 MG: 5 TABLET ORAL at 09:09

## 2022-09-26 RX ADMIN — CEFTRIAXONE SODIUM 1 G: 1 INJECTION, POWDER, FOR SOLUTION INTRAMUSCULAR; INTRAVENOUS at 05:09

## 2022-09-26 RX ADMIN — FERROUS SULFATE TAB 325 MG (65 MG ELEMENTAL FE) 1 EACH: 325 (65 FE) TAB at 09:09

## 2022-09-26 RX ADMIN — CLONIDINE HYDROCHLORIDE 0.2 MG: 0.2 TABLET ORAL at 09:09

## 2022-09-26 RX ADMIN — CLOPIDOGREL 75 MG: 75 TABLET, FILM COATED ORAL at 09:09

## 2022-09-26 RX ADMIN — CLONIDINE HYDROCHLORIDE 0.2 MG: 0.2 TABLET ORAL at 04:09

## 2022-09-26 RX ADMIN — FERROUS SULFATE TAB 325 MG (65 MG ELEMENTAL FE) 1 EACH: 325 (65 FE) TAB at 08:09

## 2022-09-26 RX ADMIN — ENOXAPARIN SODIUM 30 MG: 30 INJECTION SUBCUTANEOUS at 04:09

## 2022-09-26 RX ADMIN — MEMANTINE 5 MG: 5 TABLET ORAL at 08:09

## 2022-09-26 RX ADMIN — ASCORBIC ACID, THIAMINE MONONITRATE,RIBOFLAVIN, NIACINAMIDE, PYRIDOXINE HYDROCHLORIDE, FOLIC ACID, CYANOCOBALAMIN, BIOTIN, CALCIUM PANTOTHENATE, 1 CAPSULE: 100; 1.5; 1.7; 20; 10; 1; 6000; 150000; 5 CAPSULE, LIQUID FILLED ORAL at 09:09

## 2022-09-26 RX ADMIN — CLONIDINE HYDROCHLORIDE 0.2 MG: 0.2 TABLET ORAL at 08:09

## 2022-09-26 NOTE — PSYCH EVALUATION
"Subjective:  "I fell at home."    Patient is a 72 y.o. female seen for evaluation Onset of symptoms was about 1 week ago with controlled course since that time. Patient's depressive symptoms include impaired memory and anxiety, questionable substance abuse as her UDS (+) MDMS and fentanyl, these substances haven't been prescribed to her and upon admission she was unable to disclose how this may have gotten in her UDS.  . Previous treatment modalities employed include medication. Depression risk factors include negative life event debilitation, chronic co-morbids with difficulty to manage, disability, lives alone . Organic causes of depression present: recreational substance use (questionable). PT came into the home and saw her injured at home about 30 mins and she was still on the floor. PT called an ambulance to her home to help her. Appetite and sleep fair before admission. Has been increasingly falling and losing balance at home. Has a walker at home she usually uses. Denies any problems or changes in cognition before admission. Is currently on medicine for "my memory", was taking a psych med at home but unable to remember what it was. Sister comes usually on Sundays and fills pill planner for pt. Sister has been helping with this for about 6 months at least.       Past Psychiatric History:   Past Psych medications: Cybalta, Ambien per records (pt was unable to recall medications)  Currently in treatment with PCP.  Education: high school diploma/GED    Substance Abuse History:  Recreational drugs:  denies (UDS positive though)  Use of Alcohol: denied  Use of Caffeine: coffee 1 /day  Use of OTC: denies    Patient Active Problem List    Diagnosis Date Noted    Anemia 09/25/2022    Obesity 09/25/2022    Recurrent falls 09/25/2022    Malnutrition of moderate degree 09/25/2022    Leukocytopenia 09/25/2022    Dehydration 09/25/2022    Positive urine drug screen 09/25/2022    Anxiety 09/21/2022    Congestive heart " failure 09/21/2022    Coronary artery disease 09/21/2022    Depressive disorder 09/21/2022    Diabetes mellitus 09/21/2022    Diverticulosis of colon 09/21/2022    Generalized anxiety disorder 09/21/2022    Hypercholesterolemia 09/21/2022    Essential hypertension 09/21/2022    Obstructive sleep apnea syndrome 09/21/2022    Polyneuropathy 09/21/2022    Rheumatic mitral valve disease 09/21/2022    Stage 3b chronic kidney disease 09/21/2022    Debility 09/21/2022    Acute cystitis 09/21/2022    Atherosclerosis of arteries of extremities 08/04/2020    Major depressive disorder, single episode, moderate 12/11/2017     History reviewed. No pertinent past medical history.   History reviewed. No pertinent surgical history.   Medications Prior to Admission   Medication Sig Dispense Refill Last Dose    bumetanide (BUMEX) 0.5 MG Tab Take by mouth.       chlorthalidone (HYGROTEN) 25 MG Tab Take 25 mg by mouth once daily.       clopidogreL (PLAVIX) 75 mg tablet Take 75 mg by mouth once daily.       donepeziL (ARICEPT) 10 MG tablet Take 10 mg by mouth once daily.       doxycycline 50 MG capsule doxycycline hyclate 50 mg capsule       fenofibrate (TRICOR) 145 MG tablet Take 1 tablet by mouth once daily.       terbinafine HCL (LAMISIL) 250 mg tablet 1 tablet.       valsartan (DIOVAN) 160 MG tablet 1 tablet.        Review of patient's allergies indicates:   Allergen Reactions    Adhesive tape-silicones Blisters    Chlorpheniramine-pseudoephed Other (See Comments)    Codeine Itching    Morphine Itching     Other reaction(s): Unknown    Statins-hmg-coa reductase inhibitors Nausea Only and Rash     Other reaction(s): muscle weakness      Social History     Tobacco Use    Smoking status: Not on file    Smokeless tobacco: Not on file   Substance Use Topics    Alcohol use: Not on file      History reviewed. No pertinent family history.       Psychiatric Review Of Systems:  sleep: yes  appetite changes: no  weight changes:  no  energy/anergy: yes  interest/pleasure/anhedonia: yes  somatic symptoms: yes  libido: no  anxiety/panic: no  guilty/hopeless: no  S.I.B.s/risky behavior: no  any drugs: no, questionable  alcohol: no       Objective:  Vital signs:  Temp:  [97.7 °F (36.5 °C)-98.5 °F (36.9 °C)] 98.5 °F (36.9 °C)  Pulse:  [52-58] 56  Resp:  [18] 18  SpO2:  [97 %-99 %] 99 %  BP: (117-158)/(52-74) 148/54    No exam performed today,  psych eval .    Mental Status Evaluation:  Appearance:  age appropriate, lying in bed, hospital gown   Behavior:  friendly and cooperative   Speech:  soft   Mood:  anxious   Affect:  blunted   Thought Process:  normal and logical, forgetful at times   Thought Content:  normal, no suicidality, no homicidality, delusions, or paranoia   Sensorium:  person, place, time/date   Cognition:  memory > able to remember remote events- no   Insight:  fair   Judgment:  limited     Assessment/Plan:  Axis I: Anxiety Disorder NOS, Depressive Disorder NOS, See current hospital problem list, and Substance Abuse r/o, Substance Induced Mood Disorder r/o; Dementia hx  Axis II: Deferred  Axis III: History reviewed. No pertinent past medical history.  Axis IV: other psychosocial or environmental problems and problems with access to health care services  Axis V: 0 Inadequate information      Recommendations:   1.) Monitor trazodone 100mg at bedtime (consider dereasing dose of somnolence noted)  2.) cont. NH placement  3.) Okay to restart Cymbalta once labs stabilize (low ALT, elevated BUN/Creatinine)

## 2022-09-26 NOTE — PROGRESS NOTES
[facility]                     [address] [facility] [address]    PATIENT NAME:       JONO MATUTE  YOB: 1950  CSN:                [id number]   MRN:                [id number]  ADMIT DATE:         09/21/2022 13:49:00  PHYSICIAN:          Joseph Vides MD                            PROGRESS NOTE    DATE:      She remains in the hospital secondary to her multiple medical problems including   falls.  We are trying to get her to the LTAC for continued treatment.  The   police are working on her exposure to fentanyl and MDMF.  Urinary tract   infection is being treated with Rocephin.    PHYSICAL EXAMINATION:  VITAL SIGNS:  Afebrile.  Stable.  Blood pressure 154/85, and pulse ox 99% on   room air.  Labs, x-rays, medications on the chart and reviewed.    HEART:  Regular rate and rhythm.    LUNGS:  Clear to auscultation bilaterally.    ABDOMEN:  Nontender, nondistended.  Normoactive bowel sounds.  EXTREMITIES:  No significant edema.  She continues with shoulder pain, neck   pain, low back pain.      Physical Therapy is working with the patient.    ASSESSMENT:    1. Musculoskeletal pain with severe debility and underlying osteoarthritis.  2. Frequent falls at home.  3. Positive urine drug screen.  4. Urinary tract infection.  5. Leukocytosis, improving.  6. Anemia.  7. Multiple other medical problems as per problem list.    PLAN:  Continue current care and hopefully transfer to LTAC tomorrow for further   care.      ______________________________  Joseph Vides MD    PBS/AQS  DD:  09/26/2022  Time:  06:24PM  DT:  09/26/2022  Time:  06:32PM  Job #:  [id number]      PROGRESS NOTE

## 2022-09-26 NOTE — PLAN OF CARE
Waiting on [facility] to do psych eval in order for gloria Lechuga/c coordinator at [facility], to send to [facility] for [facility], for NH placement.

## 2022-09-26 NOTE — PT/OT/SLP PROGRESS
Physical Therapy Treatment    Patient Name:  Thu Branch   MRN:  [id number]    Recommendations:     Discharge Recommendations:  nursing facility, skilled   Discharge Equipment Recommendations:     Barriers to discharge: None    Assessment:     Thu Branch is a 72 y.o. female admitted with a medical diagnosis of Acute cystitis.  She presents with the following impairments/functional limitations: weakness, impaired endurance, impaired functional mobility, gait instability, impaired balance, decreased safety awareness   .     Pt tolerated sitting at EOB for about 15 minutes total today. She participated in several standing trials. With max A, she was able to take 2 steps along the side of the bed but fatigued very quickly. Good participated with BLE ROM/exercises, encouraged her to perform them throughout the day.    Rehab Prognosis: Fair; patient would benefit from acute skilled PT services to address these deficits and reach maximum level of function.    Recent Surgery: * No surgery found *      Plan:     During this hospitalization, patient to be seen daily to address the identified rehab impairments via gait training, therapeutic activities, therapeutic exercises and progress toward the following goals:    Plan of Care Expires:  10/21/22    Subjective     Chief Complaint: Patient reports having a bad night.  Patient/Family Comments/goals: to get strong  Pain/Comfort:         Objective:     Communicated with nurse prior to session.  Patient found HOB elevated with   upon PT entry to room.     General Precautions: Standard, fall   Orthopedic Precautions:    Braces:    Respiratory Status: Room air     Functional Mobility:  Bed Mobility:     Supine to Sit: maximal assistance  Sit to Supine: maximal assistance  Transfers:     Sit to Stand:  maximal assistance with rolling walker  Balance: sitting, min-mod A, standing max A      AM-PAC 6 CLICK MOBILITY          Therapeutic Activities and Exercises:  See  mobility above    Patient left HOB elevated with all lines intact, call button in reach, and bed alarm on..    GOALS:   Multidisciplinary Problems       Physical Therapy Goals          Problem: Physical Therapy    Goal Priority Disciplines Outcome Goal Variances Interventions   Physical Therapy Goal     PT, PT/OT Ongoing, Progressing     Description: Goals to be met by: 10/21/2022     Patient will increase functional independence with mobility by performin. Supine to sit with Modified [place]  2. Sit to stand transfer with Modified [place]  3. Gait  x 150 feet with Modified [place] using Rolling Walker.                          Time Tracking:     PT Received On:  2022  PT Start Time:  910     PT Stop Time:  930  PT Total Time (min):       Billable Minutes: Therapeutic Activity 2022

## 2022-09-27 VITALS
SYSTOLIC BLOOD PRESSURE: 150 MMHG | OXYGEN SATURATION: 99 % | HEIGHT: 62 IN | RESPIRATION RATE: 17 BRPM | BODY MASS INDEX: 34.16 KG/M2 | DIASTOLIC BLOOD PRESSURE: 76 MMHG | HEART RATE: 57 BPM | TEMPERATURE: 98 F | WEIGHT: 185.63 LBS

## 2022-09-27 LAB — SARS-COV-2 RDRP RESP QL NAA+PROBE: NEGATIVE

## 2022-09-27 PROCEDURE — 63600175 PHARM REV CODE 636 W HCPCS: Performed by: FAMILY MEDICINE

## 2022-09-27 PROCEDURE — 94761 N-INVAS EAR/PLS OXIMETRY MLT: CPT

## 2022-09-27 PROCEDURE — 25000003 PHARM REV CODE 250: Performed by: INTERNAL MEDICINE

## 2022-09-27 PROCEDURE — 87635 SARS-COV-2 COVID-19 AMP PRB: CPT | Performed by: FAMILY MEDICINE

## 2022-09-27 PROCEDURE — 63600175 PHARM REV CODE 636 W HCPCS: Performed by: INTERNAL MEDICINE

## 2022-09-27 PROCEDURE — 25000003 PHARM REV CODE 250: Performed by: FAMILY MEDICINE

## 2022-09-27 RX ORDER — LANOLIN ALCOHOL/MO/W.PET/CERES
1 CREAM (GRAM) TOPICAL 2 TIMES DAILY
Status: CANCELLED | OUTPATIENT
Start: 2022-09-27

## 2022-09-27 RX ORDER — CLONIDINE HYDROCHLORIDE 0.2 MG/1
0.2 TABLET ORAL 3 TIMES DAILY
Status: CANCELLED | OUTPATIENT
Start: 2022-09-27

## 2022-09-27 RX ORDER — ENOXAPARIN SODIUM 100 MG/ML
30 INJECTION SUBCUTANEOUS EVERY 24 HOURS
Status: CANCELLED | OUTPATIENT
Start: 2022-09-27

## 2022-09-27 RX ORDER — CLOPIDOGREL BISULFATE 75 MG/1
75 TABLET ORAL DAILY
Status: CANCELLED | OUTPATIENT
Start: 2022-09-28

## 2022-09-27 RX ORDER — ACETAMINOPHEN 500 MG
500 TABLET ORAL EVERY 4 HOURS PRN
Status: CANCELLED | OUTPATIENT
Start: 2022-09-27

## 2022-09-27 RX ORDER — ONDANSETRON 2 MG/ML
4 INJECTION INTRAMUSCULAR; INTRAVENOUS EVERY 8 HOURS PRN
Status: CANCELLED | OUTPATIENT
Start: 2022-09-27

## 2022-09-27 RX ORDER — ALPRAZOLAM 0.5 MG/1
0.5 TABLET ORAL 3 TIMES DAILY PRN
Status: CANCELLED | OUTPATIENT
Start: 2022-09-27

## 2022-09-27 RX ORDER — IBUPROFEN 200 MG
24 TABLET ORAL
Status: CANCELLED | OUTPATIENT
Start: 2022-09-27

## 2022-09-27 RX ORDER — SODIUM CHLORIDE 0.9 % (FLUSH) 0.9 %
10 SYRINGE (ML) INJECTION EVERY 12 HOURS PRN
Status: CANCELLED | OUTPATIENT
Start: 2022-09-27

## 2022-09-27 RX ORDER — DONEPEZIL HYDROCHLORIDE 5 MG/1
5 TABLET, FILM COATED ORAL DAILY
Status: CANCELLED | OUTPATIENT
Start: 2022-09-28

## 2022-09-27 RX ORDER — MEMANTINE HYDROCHLORIDE 5 MG/1
5 TABLET ORAL 2 TIMES DAILY
Status: CANCELLED | OUTPATIENT
Start: 2022-09-27

## 2022-09-27 RX ORDER — HYDROCORTISONE 1 %
CREAM (GRAM) TOPICAL
Status: DISCONTINUED | OUTPATIENT
Start: 2022-09-27 | End: 2022-09-27 | Stop reason: HOSPADM

## 2022-09-27 RX ORDER — VALSARTAN 80 MG/1
80 TABLET ORAL DAILY
Status: CANCELLED | OUTPATIENT
Start: 2022-09-28

## 2022-09-27 RX ORDER — TRAZODONE HYDROCHLORIDE 50 MG/1
100 TABLET ORAL NIGHTLY
Status: CANCELLED | OUTPATIENT
Start: 2022-09-27

## 2022-09-27 RX ORDER — IBUPROFEN 200 MG
16 TABLET ORAL
Status: CANCELLED | OUTPATIENT
Start: 2022-09-27

## 2022-09-27 RX ORDER — GLUCAGON 1 MG
1 KIT INJECTION
Status: CANCELLED | OUTPATIENT
Start: 2022-09-27

## 2022-09-27 RX ADMIN — DONEPEZIL HYDROCHLORIDE 5 MG: 5 TABLET ORAL at 09:09

## 2022-09-27 RX ADMIN — CLONIDINE HYDROCHLORIDE 0.2 MG: 0.2 TABLET ORAL at 09:09

## 2022-09-27 RX ADMIN — CLONIDINE HYDROCHLORIDE 0.2 MG: 0.2 TABLET ORAL at 04:09

## 2022-09-27 RX ADMIN — ACETAMINOPHEN 500 MG: 500 TABLET, FILM COATED ORAL at 06:09

## 2022-09-27 RX ADMIN — CEFTRIAXONE SODIUM 1 G: 1 INJECTION, POWDER, FOR SOLUTION INTRAMUSCULAR; INTRAVENOUS at 04:09

## 2022-09-27 RX ADMIN — VALSARTAN 80 MG: 80 TABLET ORAL at 09:09

## 2022-09-27 RX ADMIN — MEMANTINE 5 MG: 5 TABLET ORAL at 09:09

## 2022-09-27 RX ADMIN — CLOPIDOGREL 75 MG: 75 TABLET, FILM COATED ORAL at 09:09

## 2022-09-27 RX ADMIN — ENOXAPARIN SODIUM 30 MG: 30 INJECTION SUBCUTANEOUS at 04:09

## 2022-09-27 RX ADMIN — ASCORBIC ACID, THIAMINE MONONITRATE,RIBOFLAVIN, NIACINAMIDE, PYRIDOXINE HYDROCHLORIDE, FOLIC ACID, CYANOCOBALAMIN, BIOTIN, CALCIUM PANTOTHENATE, 1 CAPSULE: 100; 1.5; 1.7; 20; 10; 1; 6000; 150000; 5 CAPSULE, LIQUID FILLED ORAL at 09:09

## 2022-09-27 RX ADMIN — FERROUS SULFATE TAB 325 MG (65 MG ELEMENTAL FE) 1 EACH: 325 (65 FE) TAB at 09:09

## 2022-09-27 NOTE — PLAN OF CARE
Dr. Vides asked for LTAC eval.  Pt agrees on [facility]. Referral sent.  Psych eval done yesterday, now waiting on 142.

## 2022-09-27 NOTE — PLAN OF CARE
Pt is accepted to go to LTAC today.  COVID testing ordered. Notified nurse that we need to swab pt.  Informed pt that she will d/c to LTAC today.  Informed pt.

## 2022-10-11 PROBLEM — N39.0 URINARY TRACT INFECTION, SITE NOT SPECIFIED: Status: ACTIVE | Noted: 2022-10-11

## 2022-10-12 ENCOUNTER — LAB REQUISITION (OUTPATIENT)
Dept: LAB | Facility: HOSPITAL | Age: 72
End: 2022-10-12
Attending: FAMILY MEDICINE
Payer: MEDICARE

## 2022-10-12 DIAGNOSIS — R53.1 WEAKNESS: ICD-10-CM

## 2022-10-12 DIAGNOSIS — I42.9 CARDIOMYOPATHY, UNSPECIFIED: ICD-10-CM

## 2022-10-12 DIAGNOSIS — R94.31 ABNORMAL ELECTROCARDIOGRAM (ECG) (EKG): ICD-10-CM

## 2022-10-12 LAB
ALBUMIN SERPL-MCNC: 3.8 GM/DL (ref 3.4–4.8)
ALBUMIN/GLOB SERPL: 1 RATIO (ref 1.1–2)
ALP SERPL-CCNC: 59 UNIT/L (ref 40–150)
ALT SERPL-CCNC: 60 UNIT/L (ref 0–55)
AST SERPL-CCNC: 42 UNIT/L (ref 5–34)
BILIRUBIN DIRECT+TOT PNL SERPL-MCNC: 0.2 MG/DL
BNP BLD-MCNC: 42.5 PG/ML
BUN SERPL-MCNC: 43 MG/DL (ref 9.8–20.1)
CALCIUM SERPL-MCNC: 11.4 MG/DL (ref 8.4–10.2)
CHLORIDE SERPL-SCNC: 105 MMOL/L (ref 98–107)
CO2 SERPL-SCNC: 22 MMOL/L (ref 23–31)
CREAT SERPL-MCNC: 1.24 MG/DL (ref 0.55–1.02)
ERYTHROCYTE [DISTWIDTH] IN BLOOD BY AUTOMATED COUNT: 14.5 % (ref 11.5–17)
GFR SERPLBLD CREATININE-BSD FMLA CKD-EPI: 47 MLS/MIN/1.73/M2
GLOBULIN SER-MCNC: 3.8 GM/DL (ref 2.4–3.5)
GLUCOSE SERPL-MCNC: 125 MG/DL (ref 82–115)
HCT VFR BLD AUTO: 35.9 % (ref 37–47)
HGB BLD-MCNC: 11 GM/DL (ref 12–16)
MCH RBC QN AUTO: 28.5 PG (ref 27–31)
MCHC RBC AUTO-ENTMCNC: 30.6 MG/DL (ref 33–36)
MCV RBC AUTO: 93 FL (ref 80–94)
PLATELET # BLD AUTO: 480 X10(3)/MCL (ref 130–400)
PMV BLD AUTO: 10.7 FL (ref 7.4–10.4)
POTASSIUM SERPL-SCNC: 4.6 MMOL/L (ref 3.5–5.1)
PROT SERPL-MCNC: 7.6 GM/DL (ref 5.8–7.6)
RBC # BLD AUTO: 3.86 X10(6)/MCL (ref 4.2–5.4)
SODIUM SERPL-SCNC: 139 MMOL/L (ref 136–145)
WBC # SPEC AUTO: 7.5 X10(3)/MCL (ref 4.5–11.5)

## 2022-10-12 PROCEDURE — 85027 COMPLETE CBC AUTOMATED: CPT | Performed by: FAMILY MEDICINE

## 2022-10-12 PROCEDURE — 80053 COMPREHEN METABOLIC PANEL: CPT | Performed by: FAMILY MEDICINE

## 2022-10-12 PROCEDURE — 83880 ASSAY OF NATRIURETIC PEPTIDE: CPT | Performed by: FAMILY MEDICINE

## 2022-10-13 ENCOUNTER — CLINICAL SUPPORT (OUTPATIENT)
Dept: RESPIRATORY THERAPY | Facility: HOSPITAL | Age: 72
End: 2022-10-13
Attending: FAMILY MEDICINE
Payer: MEDICARE

## 2022-10-13 DIAGNOSIS — F19.11 ANTIDEPRESSANT TYPE ABUSE, IN REMISSION: Primary | ICD-10-CM

## 2022-10-13 DIAGNOSIS — F19.11 ANTIDEPRESSANT TYPE ABUSE, IN REMISSION: ICD-10-CM

## 2022-10-13 PROCEDURE — 93010 EKG 12-LEAD: ICD-10-PCS | Mod: ,,, | Performed by: INTERNAL MEDICINE

## 2022-10-13 PROCEDURE — 93005 ELECTROCARDIOGRAM TRACING: CPT

## 2022-10-13 PROCEDURE — 93010 ELECTROCARDIOGRAM REPORT: CPT | Mod: ,,, | Performed by: INTERNAL MEDICINE

## 2022-10-14 NOTE — DISCHARGE SUMMARY
[facility]                     [address] [id number]    PATIENT NAME:       JONO MATUTE  YOB: 1950  CSN:                [id number]   MRN:                [id number]  ADMIT DATE:         09/21/2022 13:49:00  PHYSICIAN:          Joseph Vides MD                          DISCHARGE SUMMARY    DATE OF DISCHARGE:  09/27/2022 18:30:00    She was brought to the emergency room on 09/21/2022, and discharged from the   hospital to LTAC on 09/27/2022.  She had been falling a lot at home.  She was   very weak, having mental status changes.  She developed some right shoulder   pain, anterior right chest pain secondary to the falls, and x-ray from a   previous ER visit showed no fractures.  She was brought by her son and   daughter-in-law, who came to my office to talk to me since they were very   concerned.  She had a urinary tract infection, but more importantly, her urine   drug screen returned positive for fentanyl and MDMS.  We were not sure where she   would have gotten this, but possibly from a family member.  Police had been   notified, and they are currently working this up.  She also has generalized pain   history and previous neck and low back pain, which is chronic.  She was given   antibiotics for her UTI.  We tried to hold off on pain medicines because of her   positive drug screen and her mental status changes on admit.  We gave her   Tylenol for pain.  She does have a history of underlying dementia, depression,   anxiety, and denied homicidal or suicidal ideations.  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  CT of her head was done showing   the same without acute changes.  She was dehydrated, she was given IV fluids.    With this, her hypertension returned and medications were given for blood   pressure control.  She also had anemia.  She developed a very severe  vaginitis   and was given Lotrisone and started on Diflucan.  Her anemia proved to be   secondary to iron deficiency and moderately low B12.  She had some electrolyte   abnormalities during hospitalization.  Magnesium had to be replaced.  She did   develop leukocytopenia, which would be eventually resolved prior to discharge.    She was discharged in stable condition to [facility]   on 09/27/2022.    ASSESSMENT:    1. She did develop some bradycardia, which was asymptomatic prior to transfer.    We will monitor this.  2. Severe neck and low back pain secondary to disk disease and osteoarthritis.  3. Right anterior chest wall pain and right shoulder pain from falls.   4. Frequent falls at home with severe debility.   5. Positive urine drug screen for fentanyl and MDMS.  6. Urinary tract infection.   7. Vaginitis.   8. Leukocytopenia, improving.   9. Anemia secondary to iron deficiency and B12 deficiency.   10. Increased confusion on admit, secondary to that stated above.  Patient does   have underlying dementia.  Her mental status is back to normal.  11. Malnutrition.   12. Depression and anxiety.   13. Dehydration on admit.   14. Low magnesium.    PLAN:    1. Discharge patient to LTAC.   2. Continue medications and diet.    3. Get PT, OT evaluation.        ______________________________  MD ROBBIN Zazueta/HUMAIRA  DD:  10/14/2022  Time:  11:15AM  DT:  10/14/2022  Time:  11:31AM  Job #:  [id number]      DISCHARGE SUMMARY

## 2022-12-02 ENCOUNTER — LAB REQUISITION (OUTPATIENT)
Dept: LAB | Facility: HOSPITAL | Age: 72
End: 2022-12-02
Payer: MEDICARE

## 2022-12-02 DIAGNOSIS — N18.31 CHRONIC KIDNEY DISEASE, STAGE 3A: ICD-10-CM

## 2022-12-02 LAB — BNP BLD-MCNC: 266.8 PG/ML

## 2022-12-02 PROCEDURE — 83880 ASSAY OF NATRIURETIC PEPTIDE: CPT | Performed by: FAMILY MEDICINE

## 2023-01-16 PROBLEM — N39.0 URINARY TRACT INFECTION, SITE NOT SPECIFIED: Status: RESOLVED | Noted: 2022-10-11 | Resolved: 2023-01-16

## 2023-01-23 ENCOUNTER — LAB REQUISITION (OUTPATIENT)
Dept: LAB | Facility: HOSPITAL | Age: 73
End: 2023-01-23
Payer: MEDICARE

## 2023-01-23 DIAGNOSIS — U07.1 COVID-19 VIRUS DETECTED: ICD-10-CM

## 2023-01-23 DIAGNOSIS — U07.1 COVID-19: ICD-10-CM

## 2023-01-23 LAB — SARS-COV-2 RNA RESP QL NAA+PROBE: POSITIVE

## 2023-01-23 PROCEDURE — 87635 SARS-COV-2 COVID-19 AMP PRB: CPT | Performed by: FAMILY MEDICINE

## 2023-01-24 ENCOUNTER — LAB REQUISITION (OUTPATIENT)
Dept: LAB | Facility: HOSPITAL | Age: 73
End: 2023-01-24
Payer: MEDICARE

## 2023-01-24 DIAGNOSIS — R53.1 WEAKNESS: ICD-10-CM

## 2023-01-24 DIAGNOSIS — I42.9 CARDIOMYOPATHY, UNSPECIFIED: ICD-10-CM

## 2023-01-24 LAB
ALBUMIN SERPL-MCNC: 3.6 G/DL (ref 3.4–4.8)
ALBUMIN/GLOB SERPL: 1.3 RATIO (ref 1.1–2)
ALP SERPL-CCNC: 65 UNIT/L (ref 40–150)
ALT SERPL-CCNC: 28 UNIT/L (ref 0–55)
AST SERPL-CCNC: 13 UNIT/L (ref 5–34)
BILIRUBIN DIRECT+TOT PNL SERPL-MCNC: 0.2 MG/DL
BUN SERPL-MCNC: 28 MG/DL (ref 9.8–20.1)
CALCIUM SERPL-MCNC: 9.8 MG/DL (ref 8.4–10.2)
CHLORIDE SERPL-SCNC: 102 MMOL/L (ref 98–107)
CO2 SERPL-SCNC: 31 MMOL/L (ref 23–31)
CREAT SERPL-MCNC: 0.83 MG/DL (ref 0.55–1.02)
ERYTHROCYTE [DISTWIDTH] IN BLOOD BY AUTOMATED COUNT: 12.5 % (ref 11.5–17)
GFR SERPLBLD CREATININE-BSD FMLA CKD-EPI: >60 MLS/MIN/1.73/M2
GLOBULIN SER-MCNC: 2.8 GM/DL (ref 2.4–3.5)
GLUCOSE SERPL-MCNC: 167 MG/DL (ref 82–115)
HCT VFR BLD AUTO: 38.6 % (ref 37–47)
HGB BLD-MCNC: 12.1 GM/DL (ref 12–16)
MCH RBC QN AUTO: 28.7 PG
MCHC RBC AUTO-ENTMCNC: 31.3 MG/DL (ref 33–36)
MCV RBC AUTO: 91.7 FL (ref 80–94)
PLATELET # BLD AUTO: 208 X10(3)/MCL (ref 130–400)
PMV BLD AUTO: 11 FL (ref 7.4–10.4)
POTASSIUM SERPL-SCNC: 3.8 MMOL/L (ref 3.5–5.1)
PROT SERPL-MCNC: 6.4 GM/DL (ref 5.8–7.6)
RBC # BLD AUTO: 4.21 X10(6)/MCL (ref 4.2–5.4)
SODIUM SERPL-SCNC: 141 MMOL/L (ref 136–145)
WBC # SPEC AUTO: 5.2 X10(3)/MCL (ref 4.5–11.5)

## 2023-01-24 PROCEDURE — 80053 COMPREHEN METABOLIC PANEL: CPT | Performed by: FAMILY MEDICINE

## 2023-01-24 PROCEDURE — 85027 COMPLETE CBC AUTOMATED: CPT | Performed by: FAMILY MEDICINE

## 2023-02-23 NOTE — OP NOTE
Patient:   Thu Branch            MRN: [id number]            FIN: [id number]               Age:   71 years     Sex:  Female     :  1950   Associated Diagnoses:   None   Author:   Sylwia Benson MD      Operative Note   Operative Information   Date/ Time:  10/13/2021 07:32:00.     Procedures Performed: Stage II sacral neuromodulator implant of permanent battery.     Preoperative Diagnosis: Refractory urinary urge incontinence improvement with neurostimulator.     Postoperative Diagnosis: Same.     Surgeon: Sylwia Benson MD.     Anesthesia: IV Gen. sedation and local infiltration.     Description of Procedure/Findings/    Complications: Patient was properly identified and placed in prone position per OR protocol.  MAC anesthesia was administered.   Patient was prepped and draped in usual sterile fashion using Hibiclens prep solution.  Local injection of lidocaine with epinephrine was administered.  The previous buttock pocket incision was  opened using blunt dissection and the lead/Percutaneous Extension connection was identified and elevated.  The Percutaneous Extension was cut and removed from the field, and sharing sterility.  The subcutaneous pocket anterior to the muscle surface was enlarged and hemostasis was established.  The sutures holding the protective boot were cut and the protective boot was retracted.  The setscrews were exposed and loosened with a hex wrench.  The boot was removed and discarded.  The lead was cleansed of body fluid and dried.  The lead was inserted into the head of the InterStim II Neurostimulator] until the blue tip was visualized at the distal window.  The single set screw was tightened.  The neurostimulator was placed into the subcutaneous pocket with the etched identification side placed upwards and the excessive lead wrapped counterclockwise around the neurostimulator.  The programming head was placed over the implanted neurostimulator and a sterile  cover to ensure adequate lead connection and parameters were within normal limits.  Impedances were confirmed to be within normal limits, greater than 50 and less than 4000.  The wound was irrigated with antibiotic solution and water and closed with 3-0 Vicryl subcuticular sutures and 3-0 Vicryl skin sutures.  Counts were correct.  Steri-Strips and gauze 4 x 4's were placed over incision.  The patient was transferred to recovery room in satisfactory condition.  Using the clinician , the patient was programmed to the electrodes of optimum sensation, and given instructions on utilizing the patient  prior to discharge.  Complex programming of the neurostimulator was performed.  Final electrode selections was set. .     Esimated blood loss: loss less than  25  cc.     Findings: Temporary Boot and incision site without signs of infection temporary boot.     Complications: None.      No

## 2023-05-04 ENCOUNTER — HOSPITAL ENCOUNTER (OUTPATIENT)
Dept: RADIOLOGY | Facility: HOSPITAL | Age: 73
Discharge: HOME | End: 2023-05-04
Attending: FAMILY MEDICINE
Payer: MEDICARE

## 2023-05-04 DIAGNOSIS — R74.8 ELEVATED LIVER ENZYMES: ICD-10-CM

## 2023-05-04 PROCEDURE — 76705 ECHO EXAM OF ABDOMEN: CPT | Mod: TC

## 2023-06-28 ENCOUNTER — HOSPITAL ENCOUNTER (INPATIENT)
Facility: HOSPITAL | Age: 73
LOS: 3 days | Discharge: HOME | DRG: 689 | End: 2023-07-01
Attending: EMERGENCY MEDICINE | Admitting: FAMILY MEDICINE
Payer: MEDICARE

## 2023-06-28 DIAGNOSIS — R65.10 SIRS (SYSTEMIC INFLAMMATORY RESPONSE SYNDROME): ICD-10-CM

## 2023-06-28 DIAGNOSIS — R41.82 ALTERED MENTAL STATUS: ICD-10-CM

## 2023-06-28 DIAGNOSIS — E11.69 TYPE 2 DIABETES MELLITUS WITH OTHER SPECIFIED COMPLICATION, WITHOUT LONG-TERM CURRENT USE OF INSULIN: ICD-10-CM

## 2023-06-28 DIAGNOSIS — N39.0 URINARY TRACT INFECTION WITHOUT HEMATURIA, SITE UNSPECIFIED: Primary | ICD-10-CM

## 2023-06-28 DIAGNOSIS — I10 HYPERTENSION, UNSPECIFIED TYPE: ICD-10-CM

## 2023-06-28 LAB
ALBUMIN SERPL-MCNC: 3.2 G/DL (ref 3.4–4.8)
ALBUMIN/GLOB SERPL: 1.1 RATIO (ref 1.1–2)
ALP SERPL-CCNC: 42 UNIT/L (ref 40–150)
ALT SERPL-CCNC: 52 UNIT/L (ref 0–55)
APPEARANCE UR: CLEAR
APTT PPP: 40.4 SECONDS (ref 23.2–33.7)
AST SERPL-CCNC: 16 UNIT/L (ref 5–34)
BACTERIA #/AREA URNS AUTO: ABNORMAL /HPF
BASOPHILS # BLD AUTO: 0.02 X10(3)/MCL
BASOPHILS NFR BLD AUTO: 0.3 %
BILIRUB UR QL STRIP.AUTO: NEGATIVE MG/DL
BILIRUBIN DIRECT+TOT PNL SERPL-MCNC: 0.3 MG/DL
BUN SERPL-MCNC: 32 MG/DL (ref 9.8–20.1)
CALCIUM SERPL-MCNC: 10.4 MG/DL (ref 8.4–10.2)
CHLORIDE SERPL-SCNC: 101 MMOL/L (ref 98–107)
CO2 SERPL-SCNC: 28 MMOL/L (ref 23–31)
COLOR UR: YELLOW
CREAT SERPL-MCNC: 0.7 MG/DL (ref 0.55–1.02)
EOSINOPHIL # BLD AUTO: 0.06 X10(3)/MCL (ref 0–0.9)
EOSINOPHIL NFR BLD AUTO: 0.8 %
ERYTHROCYTE [DISTWIDTH] IN BLOOD BY AUTOMATED COUNT: 13.7 % (ref 11.5–17)
GFR SERPLBLD CREATININE-BSD FMLA CKD-EPI: >60 MLS/MIN/1.73/M2
GLOBULIN SER-MCNC: 2.8 GM/DL (ref 2.4–3.5)
GLUCOSE SERPL-MCNC: 209 MG/DL (ref 82–115)
GLUCOSE UR QL STRIP.AUTO: NEGATIVE MG/DL
HCT VFR BLD AUTO: 40.6 % (ref 37–47)
HGB BLD-MCNC: 13.3 G/DL (ref 12–16)
IMM GRANULOCYTES # BLD AUTO: 0.1 X10(3)/MCL (ref 0–0.04)
IMM GRANULOCYTES NFR BLD AUTO: 1.3 %
INR BLD: 1.01 (ref 0–1.3)
KETONES UR QL STRIP.AUTO: NEGATIVE MG/DL
LACTATE SERPL-SCNC: 1.2 MMOL/L (ref 0.5–2.2)
LEUKOCYTE ESTERASE UR QL STRIP.AUTO: NEGATIVE UNIT/L
LYMPHOCYTES # BLD AUTO: 0.78 X10(3)/MCL (ref 0.6–4.6)
LYMPHOCYTES NFR BLD AUTO: 9.8 %
MAGNESIUM SERPL-MCNC: 2.4 MG/DL (ref 1.6–2.6)
MCH RBC QN AUTO: 30.2 PG (ref 27–31)
MCHC RBC AUTO-ENTMCNC: 32.8 G/DL (ref 33–36)
MCV RBC AUTO: 92.1 FL (ref 80–94)
MONOCYTES # BLD AUTO: 0.45 X10(3)/MCL (ref 0.1–1.3)
MONOCYTES NFR BLD AUTO: 5.6 %
MUCOUS THREADS URNS QL MICRO: ABNORMAL /LPF
NEUTROPHILS # BLD AUTO: 6.59 X10(3)/MCL (ref 2.1–9.2)
NEUTROPHILS NFR BLD AUTO: 82.2 %
NITRITE UR QL STRIP.AUTO: NEGATIVE
PH UR STRIP.AUTO: 5.5 [PH]
PLATELET # BLD AUTO: 215 X10(3)/MCL (ref 130–400)
PMV BLD AUTO: 9.9 FL (ref 7.4–10.4)
POCT GLUCOSE: 196 MG/DL (ref 70–110)
POTASSIUM SERPL-SCNC: 4.3 MMOL/L (ref 3.5–5.1)
PROT SERPL-MCNC: 6 GM/DL (ref 5.8–7.6)
PROT UR QL STRIP.AUTO: NEGATIVE MG/DL
PROTHROMBIN TIME: 13.6 SECONDS (ref 12.5–14.5)
RBC # BLD AUTO: 4.41 X10(6)/MCL (ref 4.2–5.4)
RBC #/AREA URNS AUTO: ABNORMAL /HPF
RBC UR QL AUTO: ABNORMAL UNIT/L
SODIUM SERPL-SCNC: 136 MMOL/L (ref 136–145)
SP GR UR STRIP.AUTO: 1.01
SQUAMOUS #/AREA URNS AUTO: ABNORMAL /HPF
TROPONIN I SERPL-MCNC: 0.01 NG/ML (ref 0–0.04)
UROBILINOGEN UR STRIP-ACNC: 0.2 MG/DL
WBC # SPEC AUTO: 8 X10(3)/MCL (ref 4.5–11.5)
WBC #/AREA URNS AUTO: ABNORMAL /HPF

## 2023-06-28 PROCEDURE — 84484 ASSAY OF TROPONIN QUANT: CPT | Performed by: NURSE PRACTITIONER

## 2023-06-28 PROCEDURE — 87040 BLOOD CULTURE FOR BACTERIA: CPT | Performed by: EMERGENCY MEDICINE

## 2023-06-28 PROCEDURE — 25000003 PHARM REV CODE 250: Performed by: EMERGENCY MEDICINE

## 2023-06-28 PROCEDURE — 83605 ASSAY OF LACTIC ACID: CPT | Performed by: EMERGENCY MEDICINE

## 2023-06-28 PROCEDURE — 94761 N-INVAS EAR/PLS OXIMETRY MLT: CPT

## 2023-06-28 PROCEDURE — 81001 URINALYSIS AUTO W/SCOPE: CPT | Performed by: NURSE PRACTITIONER

## 2023-06-28 PROCEDURE — 80053 COMPREHEN METABOLIC PANEL: CPT | Performed by: NURSE PRACTITIONER

## 2023-06-28 PROCEDURE — 99285 EMERGENCY DEPT VISIT HI MDM: CPT | Mod: 25

## 2023-06-28 PROCEDURE — 85025 COMPLETE CBC W/AUTO DIFF WBC: CPT | Performed by: NURSE PRACTITIONER

## 2023-06-28 PROCEDURE — 87077 CULTURE AEROBIC IDENTIFY: CPT | Performed by: NURSE PRACTITIONER

## 2023-06-28 PROCEDURE — 87184 SC STD DISK METHOD PER PLATE: CPT | Performed by: NURSE PRACTITIONER

## 2023-06-28 PROCEDURE — 63600175 PHARM REV CODE 636 W HCPCS: Performed by: EMERGENCY MEDICINE

## 2023-06-28 PROCEDURE — 25000003 PHARM REV CODE 250: Performed by: FAMILY MEDICINE

## 2023-06-28 PROCEDURE — 51701 INSERT BLADDER CATHETER: CPT

## 2023-06-28 PROCEDURE — 85730 THROMBOPLASTIN TIME PARTIAL: CPT | Performed by: NURSE PRACTITIONER

## 2023-06-28 PROCEDURE — 83735 ASSAY OF MAGNESIUM: CPT | Performed by: NURSE PRACTITIONER

## 2023-06-28 PROCEDURE — 85610 PROTHROMBIN TIME: CPT | Performed by: NURSE PRACTITIONER

## 2023-06-28 PROCEDURE — 93005 ELECTROCARDIOGRAM TRACING: CPT

## 2023-06-28 PROCEDURE — 11000001 HC ACUTE MED/SURG PRIVATE ROOM

## 2023-06-28 RX ORDER — ACETAMINOPHEN 500 MG
500 TABLET ORAL EVERY 4 HOURS PRN
Status: DISCONTINUED | OUTPATIENT
Start: 2023-06-28 | End: 2023-07-01 | Stop reason: HOSPADM

## 2023-06-28 RX ORDER — LACTULOSE 10 G/15ML
SOLUTION ORAL; RECTAL
COMMUNITY

## 2023-06-28 RX ORDER — CYCLOBENZAPRINE HCL 10 MG
10 TABLET ORAL 2 TIMES DAILY PRN
Status: ON HOLD | COMMUNITY
Start: 2023-06-09 | End: 2023-07-01 | Stop reason: HOSPADM

## 2023-06-28 RX ORDER — DONEPEZIL HYDROCHLORIDE 5 MG/1
10 TABLET, FILM COATED ORAL DAILY
Status: DISCONTINUED | OUTPATIENT
Start: 2023-06-29 | End: 2023-07-01 | Stop reason: HOSPADM

## 2023-06-28 RX ORDER — SENNOSIDES 8.6 MG/1
1 TABLET ORAL DAILY
Status: DISCONTINUED | OUTPATIENT
Start: 2023-06-29 | End: 2023-07-01 | Stop reason: HOSPADM

## 2023-06-28 RX ORDER — TRAZODONE HYDROCHLORIDE 150 MG/1
TABLET ORAL
Status: ON HOLD | COMMUNITY
End: 2023-07-01 | Stop reason: HOSPADM

## 2023-06-28 RX ORDER — SODIUM CHLORIDE 9 MG/ML
500 INJECTION, SOLUTION INTRAVENOUS
Status: DISCONTINUED | OUTPATIENT
Start: 2023-06-28 | End: 2023-06-28

## 2023-06-28 RX ORDER — BUMETANIDE 1 MG/1
TABLET ORAL
COMMUNITY

## 2023-06-28 RX ORDER — ONDANSETRON 2 MG/ML
4 INJECTION INTRAMUSCULAR; INTRAVENOUS EVERY 8 HOURS PRN
Status: DISCONTINUED | OUTPATIENT
Start: 2023-06-28 | End: 2023-07-01 | Stop reason: HOSPADM

## 2023-06-28 RX ORDER — LACTULOSE 10 G/15ML
30 SOLUTION ORAL DAILY
Status: DISCONTINUED | OUTPATIENT
Start: 2023-06-29 | End: 2023-07-01 | Stop reason: HOSPADM

## 2023-06-28 RX ORDER — METHYLPHENIDATE HYDROCHLORIDE 5 MG/1
5 TABLET ORAL DAILY
Status: DISCONTINUED | OUTPATIENT
Start: 2023-06-29 | End: 2023-07-01 | Stop reason: HOSPADM

## 2023-06-28 RX ORDER — MEMANTINE HYDROCHLORIDE 5 MG/1
10 TABLET ORAL 2 TIMES DAILY
Status: DISCONTINUED | OUTPATIENT
Start: 2023-06-28 | End: 2023-07-01 | Stop reason: HOSPADM

## 2023-06-28 RX ORDER — HYDRALAZINE HYDROCHLORIDE 20 MG/ML
10 INJECTION INTRAMUSCULAR; INTRAVENOUS
Status: COMPLETED | OUTPATIENT
Start: 2023-06-28 | End: 2023-06-28

## 2023-06-28 RX ORDER — SODIUM CHLORIDE 0.9 % (FLUSH) 0.9 %
10 SYRINGE (ML) INJECTION
Status: DISCONTINUED | OUTPATIENT
Start: 2023-06-28 | End: 2023-07-01 | Stop reason: HOSPADM

## 2023-06-28 RX ORDER — BISACODYL 10 MG
10 SUPPOSITORY, RECTAL RECTAL DAILY PRN
Status: DISCONTINUED | OUTPATIENT
Start: 2023-06-28 | End: 2023-07-01 | Stop reason: HOSPADM

## 2023-06-28 RX ORDER — OXYCODONE AND ACETAMINOPHEN 5; 325 MG/1; MG/1
TABLET ORAL
COMMUNITY
Start: 2023-06-21

## 2023-06-28 RX ORDER — CITALOPRAM 20 MG/1
TABLET, FILM COATED ORAL
COMMUNITY

## 2023-06-28 RX ORDER — CITALOPRAM 20 MG/1
20 TABLET, FILM COATED ORAL DAILY
Status: DISCONTINUED | OUTPATIENT
Start: 2023-06-29 | End: 2023-07-01 | Stop reason: HOSPADM

## 2023-06-28 RX ORDER — FUROSEMIDE 10 MG/ML
20 INJECTION INTRAMUSCULAR; INTRAVENOUS
Status: COMPLETED | OUTPATIENT
Start: 2023-06-28 | End: 2023-06-28

## 2023-06-28 RX ORDER — SODIUM CHLORIDE 9 MG/ML
1000 INJECTION, SOLUTION INTRAVENOUS CONTINUOUS
Status: ACTIVE | OUTPATIENT
Start: 2023-06-28 | End: 2023-06-29

## 2023-06-28 RX ORDER — PREDNISONE 10 MG/1
TABLET ORAL
Status: ON HOLD | COMMUNITY
End: 2023-07-01 | Stop reason: SDUPTHER

## 2023-06-28 RX ADMIN — HYDRALAZINE HYDROCHLORIDE 10 MG: 20 INJECTION, SOLUTION INTRAMUSCULAR; INTRAVENOUS at 03:06

## 2023-06-28 RX ADMIN — SODIUM CHLORIDE 1000 ML: 9 INJECTION, SOLUTION INTRAVENOUS at 06:06

## 2023-06-28 RX ADMIN — CEFTRIAXONE SODIUM 2 G: 2 INJECTION, POWDER, FOR SOLUTION INTRAMUSCULAR; INTRAVENOUS at 02:06

## 2023-06-28 RX ADMIN — SODIUM CHLORIDE 2000 ML: 9 INJECTION, SOLUTION INTRAVENOUS at 02:06

## 2023-06-28 RX ADMIN — FUROSEMIDE 20 MG: 10 INJECTION, SOLUTION INTRAMUSCULAR; INTRAVENOUS at 03:06

## 2023-06-28 RX ADMIN — NITROGLYCERIN 2 INCH: 20 OINTMENT TOPICAL at 04:06

## 2023-06-28 RX ADMIN — MEMANTINE HYDROCHLORIDE 10 MG: 5 TABLET ORAL at 09:06

## 2023-06-28 NOTE — ED PROVIDER NOTES
Encounter Date: 6/28/2023       History     Chief Complaint   Patient presents with    Altered Mental Status     Pt to ED via EMS for AMS, NH states pt was not as responsive as she normally is.      Ms. Branch comes in from [facility].  She is there with multiple chronic problems heart failure vitamin deficiency dysphagia following cerebrovascular disease Ascorbic acid deficiency.  She has hyperlipidemia and constipation drug-induced polyneuropathy.  Unspecified depression  The son who is sitting at the bedside said she was okay with the family member left 8:00 a.m. this morning then sometime around noon she had altered mental status she quit speaking normally she is awake and talking like normal according to the son.  She is not speaking to him now she said a couple of words to the nurse when he 1st went in the room and then she refused to talk anymore.    I gather the historic information from the son.  The son said that she quit 15 years ago smoking no alcohol no drugs.  That she is  living in the nursing home.  Son said she had 1 child.  No miscarriage that he is aware of.  Said she is had her COVID shots but is not sure about any of the other vaccines.  Does know that her primary care doctor is Dr. arelis Vides.  Does not know anything about a living will.      I reviewed the records is no living will or indication 1 way the other so she is a full code the records have multiple allergies listed.  Those were reviewed in the record and on the nursing home paperwork.  She does have chronic kidney disease also according to the nursing home paperwork    Review of patient's allergies indicates:   Allergen Reactions    Adhesive tape-silicones Blisters    Chlorpheniramine-pseudoephed Other (See Comments)    Codeine Itching    Morphine Itching     Other reaction(s): Unknown    Statins-hmg-coa reductase inhibitors Nausea Only and Rash     Other reaction(s): muscle weakness     Past Medical  History:   Diagnosis Date    Coronary artery disease     Depression     Hypertension     Stroke      History reviewed. No pertinent surgical history.  No family history on file.  Social History     Tobacco Use    Smoking status: Never    Smokeless tobacco: Never   Substance Use Topics    Alcohol use: Not Currently    Drug use: Never     Review of Systems   Unable to perform ROS: Mental status change     Physical Exam     Initial Vitals [06/28/23 1324]   BP Pulse Resp Temp SpO2   138/70 64 18 98.8 °F (37.1 °C) 96 %      MAP       --         Physical Exam    Nursing note and vitals reviewed.  Constitutional: She appears well-developed and well-nourished.   She would heavyset white female.  She opens her eyes when you talk to her and gently touch her shoulder but she does not speak there is a large scar on left side of her neck where they did an anterior approach for cervical spine surgery according to the son as large meters sternotomy scar where she is had bypass surgery.   HENT:   Head: Normocephalic and atraumatic.   Normocephalic atraumatic normal tympanic membranes very dry mucous membranes are noted patient eats by mouth she has no feeding tube in place.  Nose is benign oropharynx   Eyes: Pupils are equal, round, and reactive to light.   Unable to cooperate with extraocular motions random eye movements are noted   Neck: Neck supple. No thyromegaly present. No tracheal deviation present. No JVD present.   Normal range of motion.  Cardiovascular:  Normal rate and regular rhythm.           Pulmonary/Chest: Breath sounds normal. No stridor. No respiratory distress.   Abdominal: Abdomen is soft. Bowel sounds are normal. She exhibits no distension and no mass. There is no abdominal tenderness. There is no guarding.   Genitourinary:    Genitourinary Comments: Nontender cervical spine no palpable step-off in back breath sounds diminished bilaterally     Musculoskeletal:         General: Normal range of motion.       Cervical back: Normal range of motion and neck supple.     Lymphadenopathy:     She has no cervical adenopathy.   Neurological:   Her eyes are open she has no focal deficits face is symmetrical both arms and legs have muscle tone.  No spontaneous movement noted   Skin: Skin is warm and dry. Capillary refill takes less than 2 seconds.   Psychiatric:   Not speaking with us unable to obtain psychiatric evaluation       ED Course   Procedures  Labs Reviewed   CBC WITH DIFFERENTIAL - Abnormal; Notable for the following components:       Result Value    MCHC 32.8 (*)     IG# 0.10 (*)     All other components within normal limits   COMPREHENSIVE METABOLIC PANEL - Abnormal; Notable for the following components:    Glucose Level 209 (*)     Blood Urea Nitrogen 32.0 (*)     Calcium Level Total 10.4 (*)     Albumin Level 3.2 (*)     All other components within normal limits   APTT - Abnormal; Notable for the following components:    PTT 40.4 (*)     All other components within normal limits   URINALYSIS, REFLEX TO URINE CULTURE - Abnormal; Notable for the following components:    Blood, UA Trace-Intact (*)     All other components within normal limits   URINALYSIS, MICROSCOPIC - Abnormal; Notable for the following components:    Bacteria, UA Few (*)     Mucous, UA Trace (*)     WBC, UA 50-99 (*)     All other components within normal limits   POCT GLUCOSE - Abnormal; Notable for the following components:    POCT Glucose 196 (*)     All other components within normal limits   TROPONIN I - Normal   PROTIME-INR - Normal   MAGNESIUM - Normal   LACTIC ACID, PLASMA - Normal   BLOOD CULTURE [facility]   BLOOD CULTURE [facility]   CULTURE, URINE     Recent Results (from the past 3 hour(s))   Urinalysis, Reflex to Urine Culture    Collection Time: 06/28/23  2:18 PM    Specimen: Urine   Result Value Ref Range    Color, UA Yellow Yellow, Light-Yellow, Dark Yellow, Rafaela, Straw    Appearance, UA Clear Clear    Specific Gravity, UA 1.015     pH, UA 5.5  5.0 - 8.5    Protein, UA Negative Negative mg/dL    Glucose, UA Negative Negative, Normal mg/dL    Ketones, UA Negative Negative mg/dL    Blood, UA Trace-Intact (A) Negative unit/L    Bilirubin, UA Negative Negative mg/dL    Urobilinogen, UA 0.2 0.2, 1.0, Normal mg/dL    Nitrites, UA Negative Negative    Leukocyte Esterase, UA Negative Negative unit/L   Urinalysis, Microscopic    Collection Time: 06/28/23  2:18 PM   Result Value Ref Range    Bacteria, UA Few (A) None Seen, Rare, Occasional /HPF    Mucous, UA Trace (A) None Seen /LPF    RBC, UA None Seen None Seen, 0-2, 3-5, 0-5 /HPF    WBC, UA 50-99 (A) None Seen, 0-2, 3-5, 0-5 /HPF    Squamous Epithelial Cells, UA Rare None Seen, Rare, Occasional, Occ /HPF   Lactic acid, plasma    Collection Time: 06/28/23  2:42 PM   Result Value Ref Range    Lactic Acid Level 1.2 0.5 - 2.2 mmol/L       EKG Readings: (Independently Interpreted)   Initial Reading: No STEMI. Previous EKG: Compared with most recent EKG Rhythm: Normal Sinus Rhythm. Heart Rate: 61. Ectopy: No Ectopy. Conduction: Normal. ST Segments: Normal ST Segments. T Waves: Normal. Clinical Impression: Normal Sinus Rhythm   EKG shows 61 per there is P waves I believe present in the majority of these complexes.  I do think this represents a normal sinus rhythm it is fairly regular  Compared with previous EKG 13 October 2022 Is probably lead placement lead 3 is very much different as is AVF.  The remainder of the EKG looks pretty much the same but they are P waves present   ECG Results              EKG 12-lead (Final result)  Result time 06/28/23 16:17:00      Final result by Interface, Lab In [facility] (06/28/23 16:17:00)                   Narrative:    Test Reason : R41.82,    Vent. Rate : 061 BPM     Atrial Rate : 061 BPM     P-R Int : 174 ms          QRS Dur : 068 ms      QT Int : 432 ms       P-R-T Axes : 071 020 085 degrees     QTc Int : 434 ms    Normal sinus rhythm  Nonspecific ST abnormality  Abnormal ECG  When  compared with ECG of 13-OCT-2022 15:37,  T wave inversion no longer evident in Inferior leads  T wave inversion now evident in Lateral leads  Confirmed by Eladio Frederick MD ([id number]) on 6/28/2023 4:16:42 PM    Referred By: PROVIDER NOTINSYSTEM           Confirmed By:Eladio Frederick MD                                  Imaging Results              CT Head Without Contrast (Final result)  Result time 06/28/23 14:09:35      Final result by Adonay Nieves MD (06/28/23 14:09:35)                   Impression:      1. No acute intracranial abnormality identified  2. Generalized cerebral and cerebellar atrophy  3. Extensive beam hardening artifact due to metallic endovascular correlating in the region of the [id number]  4. Intracranial atherosclerosis  5. Mild scattered hypodensities at the periventricular white matter suspicious for small vessel ischemic changes      Electronically signed by: Adonay Nieves  Date:    06/28/2023  Time:    14:09               Narrative:    EXAMINATION:  CT HEAD WITHOUT CONTRAST    CLINICAL HISTORY:  Transient ischemic attack (TIA);, .    TECHNIQUE:  PATIENT RADIATION DOSE: DLP(mGycm) 837    As per PQRS measures, all CT scans at this facility used dose modulation, iterative reconstruction, and/or weight based dose adjustment when appropriate to reduce radiation dose to as low as reasonably achievable.    COMPARISON:  09/20/2022    FINDINGS:  Serial axial images were obtained of the head without the administration of IV contrast. Both brain and bone parenchymal windows were obtained.  Additional coronal and sagittal reconstructions were obtained.  Ventricles, cisterns, and sulci are prominent size.  There is no evidence of intracranial hemorrhage, midline shift, mass effect, or abnormal extra-axial fluid collections.  There is again extensive beam hardening artifact at the region of the kudaqj-st-Ooowsq due to localized endovascular coiling.  Intracranial atherosclerosis is seen.  There is  no evidence of intracranial hemorrhage, midline shift, mass effect, or abnormal extra-axial fluid collections.  Scattered hypodensities are seen within the periventricular white matter.  Cerebellar tonsils extend caudally to the level of foramen magnum.  Visualized paranasal sinuses and mastoid air cells are relatively clear.  External auditory canals are grossly patent.                                       X-Ray Chest 1 View (Final result)  Result time 06/28/23 13:59:19      Final result by Adonay Nieves MD (06/28/23 13:59:19)                   Impression:      1. Mild cardiomegaly  2. Atherosclerosis  3. Post sternotomy changes      Electronically signed by: Adonay Nieves  Date:    06/28/2023  Time:    13:59               Narrative:    EXAMINATION:  XR CHEST 1 VIEW    CLINICAL HISTORY:  altered;, .    COMPARISON:  09/28/2022    FINDINGS:  An AP view or more reveals the heart to be enlarged.  The trachea is to the right of midline.  Atherosclerosis is seen within the aorta.  No infiltrate or effusion is seen.  Post sternotomy changes present.  A right shoulder prosthetic is seen.  Orthopedic hardware is evident at the cervical spine and left shoulder.                                       Medications   cefTRIAXone (ROCEPHIN) 2 g in dextrose 5 % in water (D5W) 5 % 100 mL IVPB (MB+) (0 g Intravenous Stopped 6/28/23 1529)   sodium chloride 0.9% flush 10 mL (has no administration in time range)   ondansetron injection 4 mg (has no administration in time range)   acetaminophen suppository 650 mg (has no administration in time range)   0.9%  NaCl infusion (has no administration in time range)   nitroGLYCERIN 2% TD oint ointment 2 inch (2 inches Topical (Top) Given 6/28/23 1619)   nitroGLYCERIN 2% TD oint (NITROGLYN) 2 % ointment (has no administration in time range)   sodium chloride 0.9% bolus 2,000 mL 2,000 mL (2,000 mLs Intravenous New Bag 6/28/23 1431)   furosemide injection 20 mg (20 mg Intravenous Given 6/28/23  1541)   hydrALAZINE injection 10 mg (10 mg Intravenous Given 6/28/23 1541)     Medical Decision Making:   History:   I obtained history from: someone other than patient.       <> Summary of History: Son provided the information that she was fine this morning 8:00 a.m. when the other family member left.  But then he was called that she was markedly different.  Initial Assessment:   Change in mental status normally able to carry on conversation now not able or not willing to carry on conversation  Dry mucous membranes are noted patient does respond to speaking to her with opening her eyes she does appear to look at you.  She does not respond to verbal commands or answer questions however heart is regular rate and rhythm lungs are fairly clear abdomen seems benign neurological altered mental status but no focal deficits  Differential Diagnosis:   Sepsis, urinary tract infection, intracranial process, electrolyte disturbance, meningitis, metabolic encephalopathy, dehydration  Clinical Tests:   Lab Tests: Ordered and Reviewed       <> Summary of Lab: Urine has 50-99 white cells and a straight cath specimen but just few bacteria white count only 8000 BUN up to 32 with a fairly benign creatinine.  I do not think that represents marked dehydration.  Brain CT was unremarkable head CT was unremarkable.  Lactic acid was less than 2.  ED Management:  Evaluation hydration started sepsis type protocol but backed off once her numbers started coming back.  MDM  Problems addressed  Co-morbidities and/or factors adding to the complexity or risk for the patient:  Nursing home resident  Problems addressed:  Change in mental status  Acute problem/illness or progression/exacerbation of chronic problem with potential threat to life/bodily dysfunction?:  Change in mental status  Differential diagnoses/problems considered: see above     Amount and/or Complexity of Data Reviewed  Independent Historian: son (see above for summary)  External  Data Reviewed: notes from previous admissions, notes from previous ED visits, nursing home records, prior labs, prior EKGs, and prescription medications  (see above for summary)  Risk and benefits of testing: discussed   Labs: Labs: ordered and reviewed  Radiology:Radiology: ordered and independent interpretation performed (see above or ED course)  ECG/medicine tests:Radiology: ordered and independent interpretation performed (see above or ED course)  discussed with primary care physician    Risk  Parenteral controlled substances   Shared decision making     Critical Care  none            ED Course as of 06/28/23 1656   Wed Jun 28, 2023   1530 Brain CT benign chest x-ray benign lab work fairly unremarkable [DM]   1547 I discussed the case with her attending doctor.  We discussed lumbar puncture he did not feel it was necessary with a normal white count.  Normal brain CT.  I concurred.  Talked to the son at the bedside.  Told him what we were doing and that we would start antibiotics.  They were agreeable orders are written patient will be admitted.  We are going to give a 2000 mL bolus of fluid then we are going to run saline at 100 cc an hour her blood pressure did go up quite a bit.  We using some hydralazine and Lasix now to help control the blood pressure [DM]   1614 Patient is now awake talking carrying on lucid conversations.  Blood pressure is still up nitroglycerin paste is ordered [DM]      ED Course User Index  [DM] Adonay Soto MD                 Clinical Impression:   Final diagnoses:  [R41.82] Altered mental status  [N39.0] Urinary tract infection without hematuria, site unspecified (Primary)  [I10] Hypertension, unspecified type        ED Disposition Condition    Admit Stable                Adonay Soto MD  06/28/23 1549       Adonay Soto MD  06/28/23 1554       Adonay Soto MD  06/28/23 1656

## 2023-06-28 NOTE — Clinical Note
Diagnosis: Altered mental status [780.97.ICD-9-CM]   Admitting Provider:: FAREED EMERSON [[id number]]   Future Attending Provider: FAREED EMERSON [[id number]]   Reason for IP Medical Treatment  (Clinical interventions that can only be accomplished in the IP setting? ) :: iv ab's   I certify that Inpatient services for greater than or equal to 2 midnights are medically necessary:: Yes   Plans for Post-Acute care--if anticipated (pick the single best option):: D. Skilled Nursing Placement

## 2023-06-29 LAB
EST. AVERAGE GLUCOSE BLD GHB EST-MCNC: 157.1 MG/DL
HBA1C MFR BLD: 7.1 %
POCT GLUCOSE: 110 MG/DL (ref 70–110)
POCT GLUCOSE: 133 MG/DL (ref 70–110)

## 2023-06-29 PROCEDURE — 25000003 PHARM REV CODE 250: Performed by: FAMILY MEDICINE

## 2023-06-29 PROCEDURE — 63600175 PHARM REV CODE 636 W HCPCS: Performed by: EMERGENCY MEDICINE

## 2023-06-29 PROCEDURE — 25000003 PHARM REV CODE 250: Performed by: EMERGENCY MEDICINE

## 2023-06-29 PROCEDURE — 94761 N-INVAS EAR/PLS OXIMETRY MLT: CPT

## 2023-06-29 PROCEDURE — 83036 HEMOGLOBIN GLYCOSYLATED A1C: CPT | Performed by: FAMILY MEDICINE

## 2023-06-29 PROCEDURE — 11000001 HC ACUTE MED/SURG PRIVATE ROOM

## 2023-06-29 RX ORDER — IBUPROFEN 200 MG
16 TABLET ORAL
Status: DISCONTINUED | OUTPATIENT
Start: 2023-06-29 | End: 2023-07-01 | Stop reason: HOSPADM

## 2023-06-29 RX ORDER — MUPIROCIN 20 MG/G
OINTMENT TOPICAL 2 TIMES DAILY
Status: DISCONTINUED | OUTPATIENT
Start: 2023-06-29 | End: 2023-07-01 | Stop reason: HOSPADM

## 2023-06-29 RX ORDER — GLUCAGON 1 MG
1 KIT INJECTION
Status: DISCONTINUED | OUTPATIENT
Start: 2023-06-29 | End: 2023-07-01 | Stop reason: HOSPADM

## 2023-06-29 RX ORDER — IBUPROFEN 200 MG
24 TABLET ORAL
Status: DISCONTINUED | OUTPATIENT
Start: 2023-06-29 | End: 2023-07-01 | Stop reason: HOSPADM

## 2023-06-29 RX ORDER — INSULIN ASPART 100 [IU]/ML
1-10 INJECTION, SOLUTION INTRAVENOUS; SUBCUTANEOUS
Status: DISCONTINUED | OUTPATIENT
Start: 2023-06-29 | End: 2023-07-01 | Stop reason: HOSPADM

## 2023-06-29 RX ADMIN — MEMANTINE HYDROCHLORIDE 10 MG: 5 TABLET ORAL at 10:06

## 2023-06-29 RX ADMIN — METHYLPHENIDATE HYDROCHLORIDE 5 MG: 5 TABLET ORAL at 10:06

## 2023-06-29 RX ADMIN — LACTULOSE 30 G: 20 SOLUTION ORAL at 10:06

## 2023-06-29 RX ADMIN — SENNOSIDES 1 TABLET: 8.6 TABLET ORAL at 10:06

## 2023-06-29 RX ADMIN — CEFTRIAXONE SODIUM 2 G: 2 INJECTION, POWDER, FOR SOLUTION INTRAMUSCULAR; INTRAVENOUS at 03:06

## 2023-06-29 RX ADMIN — MEMANTINE HYDROCHLORIDE 10 MG: 5 TABLET ORAL at 09:06

## 2023-06-29 RX ADMIN — NITROGLYCERIN 2 INCH: 20 OINTMENT TOPICAL at 01:06

## 2023-06-29 RX ADMIN — SODIUM CHLORIDE 1000 ML: 9 INJECTION, SOLUTION INTRAVENOUS at 03:06

## 2023-06-29 RX ADMIN — NITROGLYCERIN 2 INCH: 20 OINTMENT TOPICAL at 06:06

## 2023-06-29 RX ADMIN — ASCORBIC ACID, THIAMINE MONONITRATE,RIBOFLAVIN, NIACINAMIDE, PYRIDOXINE HYDROCHLORIDE, FOLIC ACID, CYANOCOBALAMIN, BIOTIN, CALCIUM PANTOTHENATE, 1 CAPSULE: 100; 1.5; 1.7; 20; 10; 1; 6000; 150000; 5 CAPSULE, LIQUID FILLED ORAL at 10:06

## 2023-06-29 RX ADMIN — MUPIROCIN 1 G: 20 OINTMENT TOPICAL at 09:06

## 2023-06-29 RX ADMIN — DONEPEZIL HYDROCHLORIDE 10 MG: 5 TABLET, FILM COATED ORAL at 10:06

## 2023-06-29 RX ADMIN — CITALOPRAM HYDROBROMIDE 20 MG: 20 TABLET ORAL at 10:06

## 2023-06-29 RX ADMIN — MUPIROCIN 1 G: 20 OINTMENT TOPICAL at 10:06

## 2023-06-29 NOTE — H&P
[facility]                     [address] [address]    PATIENT NAME:       JONO MATUTE  YOB: 1950  CSN:                [id number]   MRN:                [id number]  ADMIT DATE:         06/28/2023 13:29:00  PHYSICIAN:          Joseph Vides MD                        HISTORY AND PHYSICAL      HISTORY OF PRESENT ILLNESS:  She had presented from the nursing home with   decreased mental status.  She did not recognize or respond to her son.  This is   an acute change.  Nurses fail to report any fever or other significant changes   recently.  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  In the   emergency room, a CT was done showing no acute changes of her brain, but she did   have multiple infarcts previously.  She did have a urinary tract infection and   she was started on antibiotics and admitted.  She was slightly dehydrated as   well.    PAST MEDICAL HISTORY:  Significant for history of actinic keratosis, anemia,   anxiety, aortic insufficiency, osteoarthritis, lumbar and cervical disk disease   with osteoarthritis and neuropathy with chronic pain.  She also has carotid   artery stenosis with previous CVA and occlusion.  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  She has underlying depression, diabetes type   2, esophagitis, history of fall risk, gastritis, severe debility.  She is   essentially bed and wheelchair bound at this time.  She has a hiatal hernia,   hyperlipidemia, hypertension, insomnia, Lichen sclerosus of her vulva, mitral   valve disorder with regurgitation, obstructive sleep apnea and refuses to wear   her CPAP.  She had cerebrovascular disease, rosacea, statin intolerance,   vertigo, congestive heart failure, history of hemorrhagic gastritis with anemia,   hemorrhoids grade 2 internal, coronary artery disease, diverticulosis, and   intussusception of the  ileocecal valve.    SURGICAL HISTORY:  Cholecystectomy 2012, colonoscopy 2014, EGD done in January 2022.  She had a previous fracture of her humerus on the left side with open   reduction and internal fixation in 2018.  She had partial hysterectomy in 1992   with therapy, intracranial aneurysm by coiling in 2005.  She had left shoulder   surgery and repair of her rotator cuff, right shoulder surgery secondary to   fracture in 2020. She had CABG x2 in 2021.  Carotid endarterectomy and another   colonoscopy in 2022.    FAMILY MEDICAL HISTORY:  Father had COPD, myocardial infarction.  Mother had   heart disease.    SOCIAL HISTORY:  She is a former smoker.  She quit in 2005.  She denies drugs.    She does not drink alcohol.  She previously was .    ALLERGIES:  INCLUDE  , WHICH CAUSES NAUSEA AND HEADACHE, DIZZINESS.  SHE IS   ALSO ALLERGIC TO MORPHINE, CODEINE, AND TAPE.     REVIEW OF SYSTEMS:  Otherwise unobtainable.  She is responsive.  She is alert and oriented x3 at   this time, but is a poor historian.    MEDICATIONS:  Currently include losartan 100 mg 1 p.o. daily.  She uses methyl   salicylate 25% cream to bilateral knees and neck 3 times a day, cyclobenzaprine   10 mg one at bedtime as needed also twice a day as needed.  Norco 5/325 twice a   day p.r.n. severe pain.  Namenda 10 mg 1 p.o. twice a day.  Calcium citrate 200   mg capsule twice a day.  MiraLAX 17 g in 8-ounce water daily.  Pregabalin/Lyrica   100 mg twice a day.  Prednisone 10 mg once a day.  Lactulose 20 g once a day   p.r.n. diarrhea.  Ferrous sulfate 325 mg one daily.  Bumex 1 mg once a day,   Senna 8.6 mg 1 p.o. daily.  Amlodipine 5 mg 1 p.o. daily, Linzess 145 mcg 1 p.o.   daily p.r.n. constipation, Aricept 10 mg 1 p.o. daily at bedtime, vitamin D3   2000 units 2 tablets daily, renal multivitamin 1 mg daily.  She also takes   trazodone 150 mg at bedtime for sleep, Celexa 20 mg 1 p.o. daily, and a protein   supplement.    IMMUNIZATIONS:   Up-to-date.    Full code status.    PHYSICAL EXAMINATION:  VITAL SIGNS:  Stable.  GENERAL:  She is obese, short statured, lying in bed, in no acute distress.  She   is alert, seems content.  She does recognize me and is oriented x3.  Her son   has left.    HEENT:  Shows a carotid oropharynx with a Mallampati score of 3.  NECK:  Supple, full range of motion.  No significant adenopathy with some   posterior neck pain.  HEART:  Regular rate and rhythm and distant.    LUNGS:  Clear to auscultation bilaterally.    ABDOMEN:  Nontender, nondistended.  Normoactive bowel sounds.  EXTREMITIES:  No significant edema.  /RECTAL:  Deferred.  BREAST:  Deferred.  NEUROLOGICAL:  As stated above.  She does move all 4 extremities, but is very   weak.    ASSESSMENT:    1. Mental status changes secondary to UTI with SIRS.  Cultures are pending   including blood cultures.  2. Intravascular volume depletion.  3. Underlying dementia.  4. Multiple other medical problems underlying.    PLAN:  Continue current care, replace fluids very slowly.  Give antibiotics,   Rocephin 2 g a day IV.  Follow up cultures.  Continue nursing home medications   except hold the Flexeril and the cyclobenzaprine.        ______________________________  Joseph Vides MD    PBS/AQS  DD:  06/29/2023  Time:  03:25PM  DT:  06/29/2023  Time:  05:27PM  Job #:  [id number]      HISTORY AND PHYSICAL

## 2023-06-29 NOTE — PROGRESS NOTES
[facility]                     [address] [address]    PATIENT NAME:       JONO MATUTE  YOB: 1950  CSN:                [id number]   MRN:                [id number]  ADMIT DATE:         06/28/2023 13:29:00  PHYSICIAN:          Joseph Vides MD                            PROGRESS NOTE    DATE:  06/29/2023 00:00:00    SUBJECTIVE:  She is in the hospital secondary to mental status changes secondary   to UTI with SIRS.  She is receiving Rocephin and we are awaiting the cultures   of her urine.  She is more alert today and she is oriented.  She is lying in   bed.    OBJECTIVE:  VITAL SIGNS:  Temperature maximum was 99.1 degrees.  Pulse is   between 59 and 82 beats per minute, respiratory rate 15 to 20 over the last 24   hours.  Blood pressure is stable.  Pulse ox is 97% on room air.  HEART:  Regular rate and rhythm.  LUNGS:  Clear to auscultation bilaterally.  ABDOMEN:  Nontender, nondistended, normoactive bowel sounds.  EXTREMITIES:  No significant edema.    ASSESSMENT:    1. UTI with SIRS with a decrease in mental status on admit.  2. Underlying dementia.  3. Mild dehydration-improved with mild rehydration using IV fluids.  4. Multiple other medical problems including chronic neck and back pain.    PLAN:  We have been holding her cyclobenzaprine and trazodone to avoid   over-sedation.  We will recheck labs in the morning.  Continue antibiotics.    Follow up culture as soon as is available.        ______________________________  Joseph Vides MD    PBS/AQS  DD:  06/29/2023  Time:  03:28PM  DT:  06/29/2023  Time:  05:32PM  Job #:  [id number]      PROGRESS NOTE

## 2023-06-29 NOTE — PLAN OF CARE
06/29/23 1602   Discharge Assessment   Assessment Type Discharge Planning Assessment   Source of Information patient   People in Home facility resident   Facility Arrived From: [facility]   Do you expect to return to your current living situation? Yes   Prior to hospitilization cognitive status: Alert/Oriented;No Deficits   Current cognitive status: Alert/Oriented;No Deficits   Walking or Climbing Stairs ambulation difficulty, dependent;stair climbing difficulty, dependent;transferring difficulty, dependent   Dressing/Bathing bathing difficulty, dependent;dressing difficulty, dependent   How do you get to doctors appointments? other (see comments)   Are you on dialysis? No   Do you take coumadin? No   Discharge Plan A Return to nursing home   Discharge Plan B Return to Nursing Home   DME Needed Upon Discharge  none   Discharge Plan discussed with: Patient   Transition of Care Barriers None   Physical Activity   On average, how many days per week do you engage in moderate to strenuous exercise (like a brisk walk)? Patient refu   On average, how many minutes do you engage in exercise at this level? Patient refu   Financial Resource Strain   How hard is it for you to pay for the very basics like food, housing, medical care, and heating? Patient refu   Housing Stability   In the last 12 months, was there a time when you were not able to pay the mortgage or rent on time? [place]   In the last 12 months, was there a time when you did not have a steady place to sleep or slept in a shelter (including now)? [place]   Transportation Needs   In the past 12 months, has lack of transportation kept you from medical appointments or from getting medications? Patient refu   In the past 12 months, has lack of transportation kept you from meetings, work, or from getting things needed for daily living? Patient refu   Food Insecurity   Within the past 12 months, you worried that your food would run out before you got the money to buy more.  Patient refu   Within the past 12 months, the food you bought just didn't last and you didn't have money to get more. Patient refu   Stress   Do you feel stress - tense, restless, nervous, or anxious, or unable to sleep at night because your mind is troubled all the time - these days? Patient refu   Social Connections   In a typical week, how many times do you talk on the phone with family, friends, or neighbors? Patient refu   How often do you get together with friends or relatives? Patient refu   How often do you attend Oriental orthodox or Mu-ism services? Patient refu   Do you belong to any clubs or organizations such as Oriental orthodox groups, unions, fraternal or athletic groups, or school groups? Patient refu   How often do you attend meetings of the clubs or organizations you belong to? Patient refu   Are you , , , , never , or living with a partner? Patient refu   Alcohol Use   Q1: How often do you have a drink containing alcohol? Patient refu   Q2: How many drinks containing alcohol do you have on a typical day when you are drinking? Patient refu   Q3: How often do you have six or more drinks on one occasion? Patient refu     From [facility].  Bedbound.  Transports by [facility].

## 2023-06-30 LAB
ALBUMIN SERPL-MCNC: 3 G/DL (ref 3.4–4.8)
ALBUMIN/GLOB SERPL: 1.1 RATIO (ref 1.1–2)
ALP SERPL-CCNC: 45 UNIT/L (ref 40–150)
ALT SERPL-CCNC: 46 UNIT/L (ref 0–55)
AST SERPL-CCNC: 15 UNIT/L (ref 5–34)
BASOPHILS # BLD AUTO: 0.03 X10(3)/MCL
BASOPHILS NFR BLD AUTO: 0.4 %
BILIRUBIN DIRECT+TOT PNL SERPL-MCNC: 0.4 MG/DL
BUN SERPL-MCNC: 20 MG/DL (ref 9.8–20.1)
CALCIUM SERPL-MCNC: 9.9 MG/DL (ref 8.4–10.2)
CHLORIDE SERPL-SCNC: 105 MMOL/L (ref 98–107)
CK SERPL-CCNC: 13 U/L (ref 29–168)
CO2 SERPL-SCNC: 26 MMOL/L (ref 23–31)
CREAT SERPL-MCNC: 0.61 MG/DL (ref 0.55–1.02)
CRP SERPL-MCNC: 7.6 MG/L
D DIMER PPP IA.FEU-MCNC: <0.27 UG/ML FEU (ref 0–0.5)
EOSINOPHIL # BLD AUTO: 0.19 X10(3)/MCL (ref 0–0.9)
EOSINOPHIL NFR BLD AUTO: 2.4 %
ERYTHROCYTE [DISTWIDTH] IN BLOOD BY AUTOMATED COUNT: 13.7 % (ref 11.5–17)
ERYTHROCYTE [SEDIMENTATION RATE] IN BLOOD: 28 MM/HR (ref 0–20)
GFR SERPLBLD CREATININE-BSD FMLA CKD-EPI: >60 MLS/MIN/1.73/M2
GLOBULIN SER-MCNC: 2.8 GM/DL (ref 2.4–3.5)
GLUCOSE SERPL-MCNC: 113 MG/DL (ref 82–115)
HCT VFR BLD AUTO: 39.6 % (ref 37–47)
HGB BLD-MCNC: 12.9 G/DL (ref 12–16)
IMM GRANULOCYTES # BLD AUTO: 0.07 X10(3)/MCL (ref 0–0.04)
IMM GRANULOCYTES NFR BLD AUTO: 0.9 %
LYMPHOCYTES # BLD AUTO: 1.36 X10(3)/MCL (ref 0.6–4.6)
LYMPHOCYTES NFR BLD AUTO: 17.4 %
MAGNESIUM SERPL-MCNC: 2.3 MG/DL (ref 1.6–2.6)
MCH RBC QN AUTO: 29.9 PG (ref 27–31)
MCHC RBC AUTO-ENTMCNC: 32.6 G/DL (ref 33–36)
MCV RBC AUTO: 91.9 FL (ref 80–94)
MONOCYTES # BLD AUTO: 0.45 X10(3)/MCL (ref 0.1–1.3)
MONOCYTES NFR BLD AUTO: 5.8 %
NEUTROPHILS # BLD AUTO: 5.7 X10(3)/MCL (ref 2.1–9.2)
NEUTROPHILS NFR BLD AUTO: 73.1 %
PHOSPHATE SERPL-MCNC: 2.2 MG/DL (ref 2.3–4.7)
PLATELET # BLD AUTO: 199 X10(3)/MCL (ref 130–400)
PMV BLD AUTO: 9.9 FL (ref 7.4–10.4)
POCT GLUCOSE: 110 MG/DL (ref 70–110)
POCT GLUCOSE: 115 MG/DL (ref 70–110)
POCT GLUCOSE: 124 MG/DL (ref 70–110)
POCT GLUCOSE: 163 MG/DL (ref 70–110)
POTASSIUM SERPL-SCNC: 4.2 MMOL/L (ref 3.5–5.1)
PROT SERPL-MCNC: 5.8 GM/DL (ref 5.8–7.6)
RBC # BLD AUTO: 4.31 X10(6)/MCL (ref 4.2–5.4)
SODIUM SERPL-SCNC: 139 MMOL/L (ref 136–145)
T4 FREE SERPL-MCNC: 0.82 NG/DL (ref 0.7–1.48)
TSH SERPL-ACNC: 1.92 UIU/ML (ref 0.35–4.94)
WBC # SPEC AUTO: 7.8 X10(3)/MCL (ref 4.5–11.5)

## 2023-06-30 PROCEDURE — 25000003 PHARM REV CODE 250: Performed by: FAMILY MEDICINE

## 2023-06-30 PROCEDURE — 25000003 PHARM REV CODE 250: Performed by: EMERGENCY MEDICINE

## 2023-06-30 PROCEDURE — 63600175 PHARM REV CODE 636 W HCPCS: Performed by: EMERGENCY MEDICINE

## 2023-06-30 PROCEDURE — 85379 FIBRIN DEGRADATION QUANT: CPT | Performed by: FAMILY MEDICINE

## 2023-06-30 PROCEDURE — 11000001 HC ACUTE MED/SURG PRIVATE ROOM

## 2023-06-30 PROCEDURE — 85652 RBC SED RATE AUTOMATED: CPT | Performed by: FAMILY MEDICINE

## 2023-06-30 PROCEDURE — 84439 ASSAY OF FREE THYROXINE: CPT | Performed by: FAMILY MEDICINE

## 2023-06-30 PROCEDURE — 84100 ASSAY OF PHOSPHORUS: CPT | Performed by: FAMILY MEDICINE

## 2023-06-30 PROCEDURE — 63600175 PHARM REV CODE 636 W HCPCS: Performed by: FAMILY MEDICINE

## 2023-06-30 PROCEDURE — 83735 ASSAY OF MAGNESIUM: CPT | Performed by: FAMILY MEDICINE

## 2023-06-30 PROCEDURE — 82533 TOTAL CORTISOL: CPT | Performed by: FAMILY MEDICINE

## 2023-06-30 PROCEDURE — 82550 ASSAY OF CK (CPK): CPT | Performed by: FAMILY MEDICINE

## 2023-06-30 PROCEDURE — 84443 ASSAY THYROID STIM HORMONE: CPT | Performed by: FAMILY MEDICINE

## 2023-06-30 PROCEDURE — 80053 COMPREHEN METABOLIC PANEL: CPT | Performed by: FAMILY MEDICINE

## 2023-06-30 PROCEDURE — 86140 C-REACTIVE PROTEIN: CPT | Performed by: FAMILY MEDICINE

## 2023-06-30 PROCEDURE — 85025 COMPLETE CBC W/AUTO DIFF WBC: CPT | Performed by: FAMILY MEDICINE

## 2023-06-30 PROCEDURE — 94761 N-INVAS EAR/PLS OXIMETRY MLT: CPT

## 2023-06-30 RX ORDER — CLOTRIMAZOLE AND BETAMETHASONE DIPROPIONATE 10; .64 MG/G; MG/G
CREAM TOPICAL 2 TIMES DAILY
Status: DISCONTINUED | OUTPATIENT
Start: 2023-06-30 | End: 2023-07-01 | Stop reason: HOSPADM

## 2023-06-30 RX ADMIN — CITALOPRAM HYDROBROMIDE 20 MG: 20 TABLET ORAL at 09:06

## 2023-06-30 RX ADMIN — MUPIROCIN 1 G: 20 OINTMENT TOPICAL at 09:06

## 2023-06-30 RX ADMIN — ACETAMINOPHEN 500 MG: 500 TABLET ORAL at 09:06

## 2023-06-30 RX ADMIN — LACTULOSE 30 G: 20 SOLUTION ORAL at 09:06

## 2023-06-30 RX ADMIN — INSULIN ASPART 1 UNITS: 100 INJECTION, SOLUTION INTRAVENOUS; SUBCUTANEOUS at 09:06

## 2023-06-30 RX ADMIN — SENNOSIDES 1 TABLET: 8.6 TABLET ORAL at 09:06

## 2023-06-30 RX ADMIN — ASCORBIC ACID, THIAMINE MONONITRATE,RIBOFLAVIN, NIACINAMIDE, PYRIDOXINE HYDROCHLORIDE, FOLIC ACID, CYANOCOBALAMIN, BIOTIN, CALCIUM PANTOTHENATE, 1 CAPSULE: 100; 1.5; 1.7; 20; 10; 1; 6000; 150000; 5 CAPSULE, LIQUID FILLED ORAL at 09:06

## 2023-06-30 RX ADMIN — MEMANTINE HYDROCHLORIDE 10 MG: 5 TABLET ORAL at 09:06

## 2023-06-30 RX ADMIN — DONEPEZIL HYDROCHLORIDE 10 MG: 5 TABLET, FILM COATED ORAL at 09:06

## 2023-06-30 RX ADMIN — CEFTRIAXONE SODIUM 2 G: 2 INJECTION, POWDER, FOR SOLUTION INTRAMUSCULAR; INTRAVENOUS at 02:06

## 2023-06-30 RX ADMIN — MEMANTINE HYDROCHLORIDE 10 MG: 5 TABLET ORAL at 08:06

## 2023-06-30 RX ADMIN — CLOTRIMAZOLE AND BETAMETHASONE DIPROPIONATE: 10; .5 CREAM TOPICAL at 09:06

## 2023-06-30 RX ADMIN — METHYLPHENIDATE HYDROCHLORIDE 5 MG: 5 TABLET ORAL at 09:06

## 2023-06-30 NOTE — PLAN OF CARE
Problem: Adult Inpatient Plan of Care  Goal: Plan of Care Review  Outcome: Ongoing, Progressing  Goal: Patient-Specific Goal (Individualized)  Outcome: Ongoing, Progressing  Goal: Absence of Hospital-Acquired Illness or Injury  Outcome: Ongoing, Progressing  Goal: Optimal Comfort and Wellbeing  Outcome: Ongoing, Progressing  Goal: Readiness for Transition of Care  Outcome: Ongoing, Progressing     Problem: Bariatric Environmental Safety  Goal: Safety Maintained with Care  Outcome: Ongoing, Progressing     Problem: Diabetes Comorbidity  Goal: Blood Glucose Level Within Targeted Range  Outcome: Ongoing, Progressing  Intervention: Monitor and Manage Glycemia  Flowsheets (Taken 6/30/2023 2512)  Glycemic Management: blood glucose monitored     Problem: Skin Injury Risk Increased  Goal: Skin Health and Integrity  Outcome: Ongoing, Progressing     Problem: Hypertension Comorbidity  Goal: Blood Pressure in Desired Range  Outcome: Ongoing, Progressing  Intervention: Maintain Blood Pressure Management  Flowsheets (Taken 6/30/2023 1741)  Medication Review/Management: medications reviewed     Problem: UTI (Urinary Tract Infection)  Goal: Improved Infection Symptoms  Outcome: Ongoing, Progressing  Intervention: Prevent Infection Progression  Flowsheets (Taken 6/30/2023 1741)  Infection Management: aseptic technique maintained

## 2023-07-01 VITALS
WEIGHT: 249.56 LBS | HEART RATE: 94 BPM | BODY MASS INDEX: 45.92 KG/M2 | HEIGHT: 62 IN | SYSTOLIC BLOOD PRESSURE: 150 MMHG | RESPIRATION RATE: 18 BRPM | DIASTOLIC BLOOD PRESSURE: 76 MMHG | TEMPERATURE: 98 F | OXYGEN SATURATION: 96 %

## 2023-07-01 PROBLEM — R65.10 SIRS (SYSTEMIC INFLAMMATORY RESPONSE SYNDROME): Status: ACTIVE | Noted: 2023-07-01

## 2023-07-01 LAB
BACTERIA UR CULT: ABNORMAL
POCT GLUCOSE: 133 MG/DL (ref 70–110)
POCT GLUCOSE: 137 MG/DL (ref 70–110)
POCT GLUCOSE: 146 MG/DL (ref 70–110)

## 2023-07-01 PROCEDURE — 94761 N-INVAS EAR/PLS OXIMETRY MLT: CPT

## 2023-07-01 PROCEDURE — 25000003 PHARM REV CODE 250: Performed by: EMERGENCY MEDICINE

## 2023-07-01 PROCEDURE — 25000003 PHARM REV CODE 250: Performed by: FAMILY MEDICINE

## 2023-07-01 PROCEDURE — 63600175 PHARM REV CODE 636 W HCPCS: Performed by: EMERGENCY MEDICINE

## 2023-07-01 RX ORDER — CLOTRIMAZOLE AND BETAMETHASONE DIPROPIONATE 10; .64 MG/G; MG/G
CREAM TOPICAL 2 TIMES DAILY
Qty: 60 G | Refills: 2 | Status: SHIPPED | OUTPATIENT
Start: 2023-07-01

## 2023-07-01 RX ORDER — PREDNISONE 10 MG/1
5 TABLET ORAL DAILY
Qty: 30 TABLET | Refills: 6 | Status: SHIPPED | OUTPATIENT
Start: 2023-07-01

## 2023-07-01 RX ORDER — ALPRAZOLAM 0.5 MG/1
0.25 TABLET ORAL 3 TIMES DAILY PRN
Start: 2023-07-01 | End: 2023-07-31

## 2023-07-01 RX ADMIN — MEMANTINE HYDROCHLORIDE 10 MG: 5 TABLET ORAL at 08:07

## 2023-07-01 RX ADMIN — DONEPEZIL HYDROCHLORIDE 10 MG: 5 TABLET, FILM COATED ORAL at 08:07

## 2023-07-01 RX ADMIN — CITALOPRAM HYDROBROMIDE 20 MG: 20 TABLET ORAL at 08:07

## 2023-07-01 RX ADMIN — CEFTRIAXONE SODIUM 2 G: 2 INJECTION, POWDER, FOR SOLUTION INTRAMUSCULAR; INTRAVENOUS at 03:07

## 2023-07-01 RX ADMIN — NITROGLYCERIN 2 INCH: 20 OINTMENT TOPICAL at 01:07

## 2023-07-01 RX ADMIN — CLOTRIMAZOLE AND BETAMETHASONE DIPROPIONATE: 10; .5 CREAM TOPICAL at 08:07

## 2023-07-01 RX ADMIN — MUPIROCIN 1 G: 20 OINTMENT TOPICAL at 08:07

## 2023-07-01 RX ADMIN — NITROGLYCERIN 2 INCH: 20 OINTMENT TOPICAL at 12:07

## 2023-07-01 RX ADMIN — ASCORBIC ACID, THIAMINE MONONITRATE,RIBOFLAVIN, NIACINAMIDE, PYRIDOXINE HYDROCHLORIDE, FOLIC ACID, CYANOCOBALAMIN, BIOTIN, CALCIUM PANTOTHENATE, 1 CAPSULE: 100; 1.5; 1.7; 20; 10; 1; 6000; 150000; 5 CAPSULE, LIQUID FILLED ORAL at 08:07

## 2023-07-01 RX ADMIN — METHYLPHENIDATE HYDROCHLORIDE 5 MG: 5 TABLET ORAL at 08:07

## 2023-07-01 RX ADMIN — SENNOSIDES 1 TABLET: 8.6 TABLET ORAL at 08:07

## 2023-07-01 NOTE — PLAN OF CARE
Problem: Adult Inpatient Plan of Care  Goal: Plan of Care Review  Outcome: Met  Goal: Patient-Specific Goal (Individualized)  Outcome: Met  Goal: Absence of Hospital-Acquired Illness or Injury  Outcome: Met  Goal: Optimal Comfort and Wellbeing  Outcome: Met  Goal: Readiness for Transition of Care  Outcome: Met     Problem: Bariatric Environmental Safety  Goal: Safety Maintained with Care  Outcome: Met     Problem: Diabetes Comorbidity  Goal: Blood Glucose Level Within Targeted Range  Outcome: Met     Problem: Skin Injury Risk Increased  Goal: Skin Health and Integrity  Outcome: Met     Problem: Hypertension Comorbidity  Goal: Blood Pressure in Desired Range  Outcome: Met     Problem: UTI (Urinary Tract Infection)  Goal: Improved Infection Symptoms  Outcome: Met

## 2023-07-01 NOTE — PLAN OF CARE
Problem: Adult Inpatient Plan of Care  Goal: Plan of Care Review  Outcome: Ongoing, Progressing  Goal: Patient-Specific Goal (Individualized)  Outcome: Ongoing, Progressing  Goal: Absence of Hospital-Acquired Illness or Injury  Outcome: Ongoing, Progressing  Goal: Optimal Comfort and Wellbeing  Outcome: Ongoing, Progressing  Goal: Readiness for Transition of Care  Outcome: Ongoing, Progressing     Problem: Bariatric Environmental Safety  Goal: Safety Maintained with Care  Outcome: Ongoing, Progressing     Problem: Diabetes Comorbidity  Goal: Blood Glucose Level Within Targeted Range  Outcome: Ongoing, Progressing     Problem: Skin Injury Risk Increased  Goal: Skin Health and Integrity  Outcome: Ongoing, Progressing     Problem: Hypertension Comorbidity  Goal: Blood Pressure in Desired Range  Outcome: Ongoing, Progressing     Problem: UTI (Urinary Tract Infection)  Goal: Improved Infection Symptoms  Outcome: Ongoing, Progressing

## 2023-07-02 NOTE — PROGRESS NOTES
[facility]                     [address] [id number]    PATIENT NAME:       JONO MATUTE  YOB: 1950  CSN:                [id number]   MRN:                [id number]  ADMIT DATE:         06/28/2023 13:29:00  PHYSICIAN:          Joseph Vides MD                            PROGRESS NOTE    DATE:  06/30/2023 00:00:00    SUBJECTIVE:  She remains in the hospital secondary to her UTI.  On admit, she   had a systemic inflammatory response syndrome with decreased mental status and   she had labile vitals.  Her mental status is back to baseline.  She has   underlying dementia, she is very debilitated.  Throughout hospitalization, we   have had no complications except for a yeast infection of her groin.  Accu-Cheks   have been monitored and the patient's urine culture is pending.  She has been   on Rocephin.    PHYSICAL EXAMINATION:  GENERAL:  She is obese, in no acute distress.  VITAL SIGNS:  Stable.  She is afebrile.  Accu-Cheks are stable.  Pulse ox is   stable on room air.  HEART:  Regular rate and rhythm.    LUNGS:  Clear to auscultation bilaterally.  ABDOMEN:  Obese, nondistended, soft.  EXTREMITIES:  No significant edema.    ASSESSMENT:    1. Urinary tract infection with systemic inflammatory response syndrome.  2. Underlying diabetes.  3. Underlying hypertension with coronary artery disease.  4. Dementia.  5. Debility.  6. Increased confusion on admit with previous cerebrovascular accidents.    PLAN:  She has a yeast infection of her groin, we will treat with Lotrisone and   follow up in the morning.  She has elevated inflammatory markers with known   osteoarthritis and severe debility.  We will discharge tomorrow with cultures   back and sensitive to the Rocephin.    ______________________________  Joseph Vides MD    PBS/AQS  DD:  07/01/2023  Time:  04:18PM  DT:  07/01/2023  Time:   07:12PM  Job #:  [id number]      PROGRESS NOTE

## 2023-07-03 ENCOUNTER — PATIENT OUTREACH (OUTPATIENT)
Dept: ADMINISTRATIVE | Facility: CLINIC | Age: 73
End: 2023-07-03
Payer: MEDICARE

## 2023-07-03 LAB — CORTIS SERPL IA-MCNC: <1 MCG/DL

## 2023-07-04 LAB
BACTERIA BLD CULT: NORMAL
BACTERIA BLD CULT: NORMAL

## 2023-07-24 ENCOUNTER — HOSPITAL ENCOUNTER (EMERGENCY)
Facility: HOSPITAL | Age: 73
Discharge: HOME | End: 2023-07-24
Attending: INTERNAL MEDICINE
Payer: MEDICARE

## 2023-07-24 VITALS
DIASTOLIC BLOOD PRESSURE: 84 MMHG | BODY MASS INDEX: 36.8 KG/M2 | SYSTOLIC BLOOD PRESSURE: 119 MMHG | RESPIRATION RATE: 20 BRPM | WEIGHT: 200 LBS | HEART RATE: 76 BPM | HEIGHT: 62 IN | OXYGEN SATURATION: 97 % | TEMPERATURE: 98 F

## 2023-07-24 DIAGNOSIS — T17.320A CHOKING DUE TO FOOD (REGURGITATED), INITIAL ENCOUNTER: Primary | ICD-10-CM
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
## 2023-07-24 LAB
ALBUMIN SERPL-MCNC: 3.2 G/DL (ref 3.4–4.8)
ALBUMIN/GLOB SERPL: 1 RATIO (ref 1.1–2)
ALP SERPL-CCNC: 54 UNIT/L (ref 40–150)
ALT SERPL-CCNC: 133 UNIT/L (ref 0–55)
AST SERPL-CCNC: 36 UNIT/L (ref 5–34)
BASOPHILS # BLD AUTO: 0.06 X10(3)/MCL
BASOPHILS NFR BLD AUTO: 0.8 %
BILIRUBIN DIRECT+TOT PNL SERPL-MCNC: 0.3 MG/DL
BUN SERPL-MCNC: 32 MG/DL (ref 9.8–20.1)
CALCIUM SERPL-MCNC: 10.1 MG/DL (ref 8.4–10.2)
CHLORIDE SERPL-SCNC: 102 MMOL/L (ref 98–107)
CO2 SERPL-SCNC: 26 MMOL/L (ref 23–31)
CREAT SERPL-MCNC: 0.67 MG/DL (ref 0.55–1.02)
EOSINOPHIL # BLD AUTO: 0.16 X10(3)/MCL (ref 0–0.9)
EOSINOPHIL NFR BLD AUTO: 2.1 %
ERYTHROCYTE [DISTWIDTH] IN BLOOD BY AUTOMATED COUNT: 15 % (ref 11.5–17)
GFR SERPLBLD CREATININE-BSD FMLA CKD-EPI: >60 MLS/MIN/1.73/M2
GLOBULIN SER-MCNC: 3.2 GM/DL (ref 2.4–3.5)
GLUCOSE SERPL-MCNC: 234 MG/DL (ref 82–115)
HCT VFR BLD AUTO: 41.5 % (ref 37–47)
HGB BLD-MCNC: 13.3 G/DL (ref 12–16)
IMM GRANULOCYTES # BLD AUTO: 0.14 X10(3)/MCL (ref 0–0.04)
IMM GRANULOCYTES NFR BLD AUTO: 1.8 %
LYMPHOCYTES # BLD AUTO: 1.68 X10(3)/MCL (ref 0.6–4.6)
LYMPHOCYTES NFR BLD AUTO: 21.6 %
MCH RBC QN AUTO: 31.1 PG (ref 27–31)
MCHC RBC AUTO-ENTMCNC: 32 G/DL (ref 33–36)
MCV RBC AUTO: 97 FL (ref 80–94)
MONOCYTES # BLD AUTO: 0.56 X10(3)/MCL (ref 0.1–1.3)
MONOCYTES NFR BLD AUTO: 7.2 %
NEUTROPHILS # BLD AUTO: 5.18 X10(3)/MCL (ref 2.1–9.2)
NEUTROPHILS NFR BLD AUTO: 66.5 %
PLATELET # BLD AUTO: 277 X10(3)/MCL (ref 130–400)
PMV BLD AUTO: 10.1 FL (ref 7.4–10.4)
POTASSIUM SERPL-SCNC: 4.2 MMOL/L (ref 3.5–5.1)
PROT SERPL-MCNC: 6.4 GM/DL (ref 5.8–7.6)
RBC # BLD AUTO: 4.28 X10(6)/MCL (ref 4.2–5.4)
SODIUM SERPL-SCNC: 137 MMOL/L (ref 136–145)
WBC # SPEC AUTO: 7.78 X10(3)/MCL (ref 4.5–11.5)

## 2023-07-24 PROCEDURE — 99283 EMERGENCY DEPT VISIT LOW MDM: CPT

## 2023-07-24 PROCEDURE — 80053 COMPREHEN METABOLIC PANEL: CPT | Performed by: NURSE PRACTITIONER

## 2023-07-24 PROCEDURE — 85025 COMPLETE CBC W/AUTO DIFF WBC: CPT | Performed by: NURSE PRACTITIONER

## 2023-07-24 NOTE — ED NOTES
Pt to ed rm 1 from aasi. Aaox4. Pt choked on a chicken nugget pta. Pt was able to cough it up on own. -loc. Pt was 91% on ra with aasi. Staying around 92% on ra here. Placed on 2l nc. Pt denies any pain or any symptoms at this time. Pt in no distress. On monitors. Wctm

## 2023-07-24 NOTE — ED PROVIDER NOTES
Encounter Date: 7/24/2023       History     Chief Complaint   Patient presents with    Choking     Pt sent from [facility] after choking on a chicken nugget which she reports she coughed up. EMS reports room air SpO2 91%, currently 94% on 3L nasal cannula. Denies complaints.     Patient is a 72-year-old female sent from [facility] after choking on a chicken nugget.  EMS reports initial room air sats was 91 and improved with 3 liters nasal cannula 94 percent.  Patient this time has no complaints including any chest pain difficulty swallowing shortness of breath or trouble breathing.  She is resting comfortably in the ED stretcher with stable vital signs.  I did have a cough and deep breathe which sounded like she had some phlegm which cleared and her O2 sats immediately jumped up to normal readings.    Review of patient's allergies indicates:   Allergen Reactions    Adhesive tape-silicones Blisters    Chlorpheniramine-pseudoephed Other (See Comments)    Codeine Itching    Morphine Itching     Other reaction(s): Unknown    Statins-hmg-coa reductase inhibitors Nausea Only and Rash     Other reaction(s): muscle weakness     Past Medical History:   Diagnosis Date    Coronary artery disease     Depression     Hypertension     Stroke      History reviewed. No pertinent surgical history.  History reviewed. No pertinent family history.  Social History     Tobacco Use    Smoking status: Never    Smokeless tobacco: Never   Substance Use Topics    Alcohol use: Not Currently    Drug use: Never     Review of Systems   Constitutional:  Negative for activity change, appetite change and fever.   HENT:  Negative for congestion, dental problem and sore throat.    Eyes:  Negative for discharge and itching.   Respiratory:  Negative for apnea, chest tightness and shortness of breath.    Cardiovascular:  Negative for chest pain.   Gastrointestinal:  Negative for nausea.   Endocrine: Negative for cold intolerance  and heat intolerance.   Genitourinary:  Negative for dysuria, vaginal discharge and vaginal pain.   Musculoskeletal:  Negative for back pain.   Skin:  Negative for rash.   Neurological:  Negative for dizziness, facial asymmetry and weakness.   Hematological:  Does not bruise/bleed easily.   Psychiatric/Behavioral:  Negative for agitation and behavioral problems.    All other systems reviewed and are negative.    Physical Exam     Initial Vitals [07/24/23 1724]   BP Pulse Resp Temp SpO2   (!) 156/118 82 20 98 °F (36.7 °C) (!) 94 %      MAP       --         Physical Exam    Nursing note and vitals reviewed.  Constitutional: Vital signs are normal. She appears well-developed and well-nourished.  Non-toxic appearance. She does not have a sickly appearance.   HENT:   Head: Normocephalic and atraumatic.   Right Ear: External ear normal.   Left Ear: External ear normal.   Mouth/Throat: Oropharynx is clear and moist.   Eyes: Conjunctivae, EOM and lids are normal. Pupils are equal, round, and reactive to light. Lids are everted and swept, no foreign bodies found.   Neck: Trachea normal and phonation normal. Neck supple. No thyroid mass and no thyromegaly present.   Normal range of motion.   Full passive range of motion without pain.     Cardiovascular:  Normal rate, regular rhythm, S1 normal, S2 normal, normal heart sounds, intact distal pulses and normal pulses.           Pulmonary/Chest: Breath sounds normal. No respiratory distress.   Abdominal: Abdomen is soft. There is no abdominal tenderness.   Musculoskeletal:         General: Normal range of motion.      Cervical back: Full passive range of motion without pain, normal range of motion and neck supple.     Lymphadenopathy:     She has no cervical adenopathy.   Neurological: She is alert and oriented to person, place, and time. She has normal strength. GCS score is 15. GCS eye subscore is 4. GCS verbal subscore is 5. GCS motor subscore is 6.   Skin: Skin is warm, dry  and intact. Capillary refill takes less than 2 seconds.   Psychiatric: She has a normal mood and affect. Her speech is normal and behavior is normal. Judgment normal. Cognition and memory are normal.       ED Course   Procedures  Labs Reviewed   COMPREHENSIVE METABOLIC PANEL - Abnormal; Notable for the following components:       Result Value    Glucose Level 234 (*)     Blood Urea Nitrogen 32.0 (*)     Albumin Level 3.2 (*)     Albumin/Globulin Ratio 1.0 (*)     Alanine Aminotransferase 133 (*)     Aspartate Aminotransferase 36 (*)     All other components within normal limits   CBC WITH DIFFERENTIAL - Abnormal; Notable for the following components:    MCV 97.0 (*)     MCH 31.1 (*)     MCHC 32.0 (*)     IG# 0.14 (*)     All other components within normal limits   CBC W/ AUTO DIFFERENTIAL    Narrative:     The following orders were created for panel order CBC auto differential.  Procedure                               Abnormality         Status                     ---------                               -----------         ------                     CBC with Differential[[id number]]        Abnormal            Final result                 Please view results for these tests on the individual orders.          Imaging Results              FL Esophagram Complete (Final result)  Result time 07/24/23 18:28:24      Final result by Barak Martinez MD (07/24/23 18:28:24)                   Impression:      1. Limited evaluation secondary to technique.  Limited evaluation of the lateral view secondary to overlying shoulders in patient hardware.  2. No discrete filling defect. No evidence of extraluminal extravasation of contrast.  Positive oral contrast reaches the stomach.      Electronically signed by: Barak Martinez MD  Date:    07/24/2023  Time:    18:28               Narrative:    EXAMINATION:  FL ESOPHAGRAM COMPLETE    CLINICAL HISTORY:  possible foreign body aspiration; choked on chicken  nugget;    TECHNIQUE:  Positive oral contrast was administered by the technologist and images were obtained in the AP and lateral position.    COMPARISON:  None    FINDINGS:  See below                                       Medications - No data to display                    Medical Decision Making  Patient is a 72-year-old female sent in from [facility] with nursing home after choking on a chicken nugget.  She arrives per EMS with no complaints.  Vital signs reported to be initially low however after my assessment I had her cough and deep breathing sounds like she cleared some phlegm and her sats immediately jumped up to 98 percent.    Problems Addressed:  Choking due to food (regurgitated), initial encounter: acute illness or injury     Details: Fluoroscope GI screen was done and there is no obstruction of food bolus.  Lab work was done to rule out any other acute abnormalities in her labs appear to be baseline from her previous recordings here in the emergency department.  Will discharge home with strict ED return precautions to the facility for any changing worsening symptoms.    Amount and/or Complexity of Data Reviewed  External Data Reviewed: labs, radiology and notes.  Labs: ordered. Decision-making details documented in ED Course.  Radiology: ordered. Decision-making details documented in ED Course.            Clinical Impression:   Final diagnoses:  [T17.320A] Choking due to food (regurgitated), initial encounter (Primary)        ED Disposition Condition    Discharge Stable          ED Prescriptions    None       Follow-up Information       Follow up With Specialties Details Why Contact Info    Joseph Vides MD Family Medicine Schedule an appointment as soon as possible for a visit  As needed, For ER Follow Up. [address] [place] [address]  [phone]               Shun Leon, SOCO  07/24/23 1494

## 2023-08-03 NOTE — DISCHARGE SUMMARY
[facility]                     [address] [facility] [address]    PATIENT NAME:       JONO MATUTE  YOB: 1950  CSN:                [id number]   MRN:                [id number]  ADMIT DATE:         06/28/2023 13:29:00  PHYSICIAN:          Jsoeph Vides MD                          DISCHARGE SUMMARY    DATE OF DISCHARGE:  07/01/2023 17:30:00    She was admitted to [facility] on 06/28/2023 and discharged on   07/01/2023.  She had a decreased mental status upon arrival to the hospital.    This had been recognized earlier that day by her not recognizing her son at the   nursing home.  She does have underlying multi-infarct dementia, but this was   different from baseline.  CT was done emergently and showed no acute bleed or   changes to her brain.  She did have a urinary tract infection and was started on   antibiotics.  She was also slightly dehydrated and was given IV fluids.  She is   greatly debilitated with both cervical and lumbar disk disease.  She is greatly   debilitated and is also morbidly obese.  She has obstructive sleep apnea and   refuses to use her CPAP.  Nursing home medications were continued except for   holding her muscle relaxer.  The patient was given Rocephin IV 2 g daily.  Her   mental status slowly went back to baseline.  The culture and sensitivity did   show sensitivity to Rocephin.  She did develop a yeast infection of her groin   and she was given Lotrisone for this.  She also had elevated inflammatory   markers.  This was attributed to her severe debility and osteoarthritis as well   as her infection.  She was discharged in stable condition on 07/01/2023.    ASSESSMENT:    1. Urinary tract infection with metabolic encephalopathy/systemic inflammatory   response syndrome.  2. Severe debility.  3. Underlying diabetes.  4. Underlying hypertension and coronary artery  disease.  5. Yeast candidiasis rash.  6. Underlying dementia multi-infarct in nature with increased confusion on   admit.  7. Dehydration on admit - resolved.    PLAN:  Discharged the patient back to the nursing home.  Continue Percocet 5/325   one twice a day p.r.n. pain.  Continue Tylenol 500 mg every 4 hours p.r.n. mild   pain.  Xanax 0.5 mg 3 times a day p.r.n. anxiety, Norvasc 5 mg 1 p.o. daily,   Dulcolax 10 mg suppository p.r.n. constipation daily, Bumex 1 mg daily, and   Celexa 20 mg 1 p.o. daily.  Continue Lotrisone to rash twice a day until clear.    Continue Benadryl 25 mg every 6 hours p.r.n. itching or insomnia.  Continue   Aricept 10 mg 1 p.o. daily, lactulose 45 mL daily p.r.n. constipation, and   Linzess 145 mcg daily for constipation.  She also takes losartan 100 mg 1 p.o.   daily, Ritalin 5 mg 1 p.o. daily, Namenda 10 mg 1 p.o. daily, prednisone 10 mg   take half of a tablet daily, pregabalin 50 mg 1 twice a day, senna 8.6 mg once a   day, vitamin renal formula 1 p.o. daily.  Muscle relaxer and iron as well as   trazodone were discontinued.    ACTIVITY:  As tolerated.    Please use the CPAP machine.  Continue diabetic cardiac diet.  Follow up with me   in 1 week.        ______________________________  MD ROBBIN Zazueta/HUMAIRA  DD:  08/03/2023  Time:  03:14PM  DT:  08/03/2023  Time:  03:47PM  Job #:  [id number]      DISCHARGE SUMMARY

## 2023-08-16 DIAGNOSIS — K11.20 PAROTITIS: Primary | ICD-10-CM

## 2023-08-21 ENCOUNTER — HOSPITAL ENCOUNTER (OUTPATIENT)
Dept: RADIOLOGY | Facility: HOSPITAL | Age: 73
Discharge: HOME | End: 2023-08-21
Attending: OTOLARYNGOLOGY
Payer: MEDICARE

## 2023-08-21 DIAGNOSIS — K11.20 PAROTITIS: ICD-10-CM

## 2023-08-21 PROCEDURE — 70491 CT SOFT TISSUE NECK W/DYE: CPT | Mod: TC

## 2023-08-21 PROCEDURE — 25500020 PHARM REV CODE 255: Performed by: OTOLARYNGOLOGY

## 2023-08-21 RX ADMIN — IOPAMIDOL 100 ML: 755 INJECTION, SOLUTION INTRAVENOUS at 10:08

## 2024-02-16 ENCOUNTER — LAB REQUISITION (OUTPATIENT)
Dept: LAB | Facility: HOSPITAL | Age: 74
End: 2024-02-16
Payer: MEDICARE

## 2024-02-16 DIAGNOSIS — N39.0 URINARY TRACT INFECTION, SITE NOT SPECIFIED: ICD-10-CM

## 2024-02-16 DIAGNOSIS — E11.40 TYPE 2 DIABETES MELLITUS WITH DIABETIC NEUROPATHY, UNSPECIFIED: ICD-10-CM

## 2024-02-16 LAB
APPEARANCE UR: ABNORMAL
BACTERIA #/AREA URNS AUTO: ABNORMAL /HPF
BILIRUB UR QL STRIP.AUTO: NEGATIVE
COLOR UR AUTO: YELLOW
GLUCOSE UR QL STRIP.AUTO: NEGATIVE
KETONES UR QL STRIP.AUTO: NEGATIVE
LEUKOCYTE ESTERASE UR QL STRIP.AUTO: ABNORMAL
MICROALBUMIN UR-MCNC: 11.1 UG/ML
NITRITE UR QL STRIP.AUTO: NEGATIVE
PH UR STRIP.AUTO: 6 [PH]
PROT UR QL STRIP.AUTO: NEGATIVE
RBC #/AREA URNS AUTO: ABNORMAL /HPF
RBC UR QL AUTO: ABNORMAL
SP GR UR STRIP.AUTO: 1.01 (ref 1–1.03)
SQUAMOUS #/AREA URNS AUTO: ABNORMAL /HPF
UROBILINOGEN UR STRIP-ACNC: 0.2
WBC #/AREA URNS AUTO: ABNORMAL /HPF

## 2024-02-16 PROCEDURE — 87086 URINE CULTURE/COLONY COUNT: CPT | Performed by: FAMILY MEDICINE

## 2024-02-16 PROCEDURE — 82043 UR ALBUMIN QUANTITATIVE: CPT | Performed by: FAMILY MEDICINE

## 2024-02-16 PROCEDURE — 81003 URINALYSIS AUTO W/O SCOPE: CPT | Performed by: FAMILY MEDICINE

## 2024-02-19 LAB — BACTERIA UR CULT: ABNORMAL

## 2024-02-20 DIAGNOSIS — I25.10 CORONARY ATHEROSCLEROSIS OF NATIVE CORONARY ARTERY: Primary | ICD-10-CM

## 2024-03-08 ENCOUNTER — HOSPITAL ENCOUNTER (OUTPATIENT)
Dept: RADIOLOGY | Facility: HOSPITAL | Age: 74
Discharge: HOME | End: 2024-03-08
Attending: FAMILY MEDICINE
Payer: MEDICARE

## 2024-03-08 DIAGNOSIS — I25.10 CORONARY ATHEROSCLEROSIS OF NATIVE CORONARY ARTERY: ICD-10-CM

## 2024-03-08 LAB
AV PEAK GRADIENT: 11 MMHG
AV REGURGITATION PRESSURE HALF TIME: 429.2 MS
AV VALVE AREA BY VELOCITY RATIO: 1.62 CM²
AV VELOCITY RATIO: 0.65
CV ECHO LV RWT: 0.88 CM
DOP CALC AO PEAK VEL: 1.69 M/S
DOP CALC LVOT AREA: 2.5 CM2
DOP CALC LVOT DIAMETER: 1.78 CM
DOP CALC LVOT PEAK VEL: 1.1 M/S
DOP CALC MV VTI: 34.1 CM
E WAVE DECELERATION TIME: 271.32 MSEC
E/A RATIO: 0.8
ECHO LV POSTERIOR WALL: 1.3 CM (ref 0.6–1.1)
FRACTIONAL SHORTENING: 40 % (ref 28–44)
INTERVENTRICULAR SEPTUM: 1.3 CM (ref 0.6–1.1)
LEFT ATRIUM SIZE: 3.36 CM
LEFT INTERNAL DIMENSION IN SYSTOLE: 1.77 CM (ref 2.1–4)
LEFT VENTRICLE DIASTOLIC VOLUME: 33.25 ML
LEFT VENTRICLE SYSTOLIC VOLUME: 9.24 ML
LEFT VENTRICULAR INTERNAL DIMENSION IN DIASTOLE: 2.94 CM (ref 3.5–6)
LEFT VENTRICULAR MASS: 120.92 G
MV MEAN GRADIENT: 1 MMHG
MV PEAK A VEL: 0.91 M/S
MV PEAK E VEL: 0.73 M/S
MV PEAK GRADIENT: 3 MMHG
MV STENOSIS PRESSURE HALF TIME: 78.68 MS
MV VALVE AREA P 1/2 METHOD: 2.8 CM2
OHS LV EJECTION FRACTION SIMPSONS BIPLANE MOD: 67 %
PISA AR MAX VEL: 2.83 M/S
PISA TR MAX VEL: 2.23 M/S
PV PEAK GRADIENT: 6 MMHG
PV PEAK VELOCITY: 1.21 M/S
RIGHT VENTRICULAR END-DIASTOLIC DIMENSION: 3.17 CM
TR MAX PG: 20 MMHG

## 2024-03-08 PROCEDURE — 93306 TTE W/DOPPLER COMPLETE: CPT

## 2024-05-12 ENCOUNTER — HOSPITAL ENCOUNTER (INPATIENT)
Facility: HOSPITAL | Age: 74
LOS: 1 days | Discharge: HOME | DRG: 682 | End: 2024-05-14
Attending: INTERNAL MEDICINE | Admitting: EMERGENCY MEDICINE
Payer: MEDICARE

## 2024-05-12 DIAGNOSIS — R53.1 GENERALIZED WEAKNESS: ICD-10-CM

## 2024-05-12 DIAGNOSIS — E86.0 DEHYDRATION: Primary | ICD-10-CM

## 2024-05-12 PROBLEM — N17.9 AKI (ACUTE KIDNEY INJURY): Status: ACTIVE | Noted: 2024-05-12

## 2024-05-12 PROBLEM — G93.41 ACUTE METABOLIC ENCEPHALOPATHY: Status: ACTIVE | Noted: 2024-05-12

## 2024-05-12 PROBLEM — I95.9 HYPOTENSION: Status: ACTIVE | Noted: 2024-05-12

## 2024-05-12 LAB
ALBUMIN SERPL-MCNC: 3.7 G/DL (ref 3.4–4.8)
ALBUMIN/GLOB SERPL: 0.9 RATIO (ref 1.1–2)
ALP SERPL-CCNC: 64 UNIT/L (ref 40–150)
ALT SERPL-CCNC: 16 UNIT/L (ref 0–55)
AST SERPL-CCNC: 12 UNIT/L (ref 5–34)
BASOPHILS # BLD AUTO: 0.08 X10(3)/MCL
BASOPHILS NFR BLD AUTO: 1.1 %
BILIRUB SERPL-MCNC: 0.3 MG/DL
BNP BLD-MCNC: 35.2 PG/ML
BUN SERPL-MCNC: 57 MG/DL (ref 9.8–20.1)
CALCIUM SERPL-MCNC: 11.2 MG/DL (ref 8.4–10.2)
CHLORIDE SERPL-SCNC: 109 MMOL/L (ref 98–107)
CO2 SERPL-SCNC: 26 MMOL/L (ref 23–31)
CREAT SERPL-MCNC: 1.8 MG/DL (ref 0.55–1.02)
EOSINOPHIL # BLD AUTO: 0.45 X10(3)/MCL (ref 0–0.9)
EOSINOPHIL NFR BLD AUTO: 6.4 %
ERYTHROCYTE [DISTWIDTH] IN BLOOD BY AUTOMATED COUNT: 14.9 % (ref 11.5–17)
FLUAV AG UPPER RESP QL IA.RAPID: NOT DETECTED
FLUBV AG UPPER RESP QL IA.RAPID: NOT DETECTED
GFR SERPLBLD CREATININE-BSD FMLA CKD-EPI: 30 ML/MIN/1.73/M2
GLOBULIN SER-MCNC: 4 GM/DL (ref 2.4–3.5)
GLUCOSE SERPL-MCNC: 96 MG/DL (ref 82–115)
HCT VFR BLD AUTO: 39.4 % (ref 37–47)
HGB BLD-MCNC: 12.1 G/DL (ref 12–16)
IMM GRANULOCYTES # BLD AUTO: 0.03 X10(3)/MCL (ref 0–0.04)
IMM GRANULOCYTES NFR BLD AUTO: 0.4 %
LACTATE SERPL-SCNC: 0.7 MMOL/L (ref 0.5–2.2)
LYMPHOCYTES # BLD AUTO: 1.57 X10(3)/MCL (ref 0.6–4.6)
LYMPHOCYTES NFR BLD AUTO: 22.3 %
MCH RBC QN AUTO: 27.2 PG (ref 27–31)
MCHC RBC AUTO-ENTMCNC: 30.7 G/DL (ref 33–36)
MCV RBC AUTO: 88.5 FL (ref 80–94)
MONOCYTES # BLD AUTO: 0.49 X10(3)/MCL (ref 0.1–1.3)
MONOCYTES NFR BLD AUTO: 7 %
NEUTROPHILS # BLD AUTO: 4.42 X10(3)/MCL (ref 2.1–9.2)
NEUTROPHILS NFR BLD AUTO: 62.8 %
OHS QRS DURATION: 78 MS
OHS QTC CALCULATION: 470 MS
PLATELET # BLD AUTO: 259 X10(3)/MCL (ref 130–400)
PMV BLD AUTO: 11.7 FL (ref 7.4–10.4)
POTASSIUM SERPL-SCNC: 4.6 MMOL/L (ref 3.5–5.1)
PROT SERPL-MCNC: 7.7 GM/DL (ref 5.8–7.6)
RBC # BLD AUTO: 4.45 X10(6)/MCL (ref 4.2–5.4)
RSV A 5' UTR RNA NPH QL NAA+PROBE: NOT DETECTED
SARS-COV-2 RNA RESP QL NAA+PROBE: NOT DETECTED
SODIUM SERPL-SCNC: 142 MMOL/L (ref 136–145)
TROPONIN I SERPL-MCNC: <0.01 NG/ML (ref 0–0.04)
WBC # SPEC AUTO: 7.04 X10(3)/MCL (ref 4.5–11.5)

## 2024-05-12 PROCEDURE — 94761 N-INVAS EAR/PLS OXIMETRY MLT: CPT

## 2024-05-12 PROCEDURE — 85025 COMPLETE CBC W/AUTO DIFF WBC: CPT | Performed by: INTERNAL MEDICINE

## 2024-05-12 PROCEDURE — 80053 COMPREHEN METABOLIC PANEL: CPT | Performed by: INTERNAL MEDICINE

## 2024-05-12 PROCEDURE — G0378 HOSPITAL OBSERVATION PER HR: HCPCS

## 2024-05-12 PROCEDURE — 83880 ASSAY OF NATRIURETIC PEPTIDE: CPT | Performed by: INTERNAL MEDICINE

## 2024-05-12 PROCEDURE — 87040 BLOOD CULTURE FOR BACTERIA: CPT | Performed by: INTERNAL MEDICINE

## 2024-05-12 PROCEDURE — 93005 ELECTROCARDIOGRAM TRACING: CPT

## 2024-05-12 PROCEDURE — 25000003 PHARM REV CODE 250: Performed by: INTERNAL MEDICINE

## 2024-05-12 PROCEDURE — 83605 ASSAY OF LACTIC ACID: CPT | Performed by: INTERNAL MEDICINE

## 2024-05-12 PROCEDURE — 84484 ASSAY OF TROPONIN QUANT: CPT | Performed by: INTERNAL MEDICINE

## 2024-05-12 PROCEDURE — A4216 STERILE WATER/SALINE, 10 ML: HCPCS | Performed by: INTERNAL MEDICINE

## 2024-05-12 PROCEDURE — 99285 EMERGENCY DEPT VISIT HI MDM: CPT | Mod: 25

## 2024-05-12 PROCEDURE — 93010 ELECTROCARDIOGRAM REPORT: CPT | Mod: ,,, | Performed by: INTERNAL MEDICINE

## 2024-05-12 PROCEDURE — 25000003 PHARM REV CODE 250: Performed by: FAMILY MEDICINE

## 2024-05-12 PROCEDURE — 0241U COVID/RSV/FLU A&B PCR: CPT | Performed by: INTERNAL MEDICINE

## 2024-05-12 RX ORDER — ASCORBIC ACID 250 MG
500 TABLET ORAL DAILY
Status: DISCONTINUED | OUTPATIENT
Start: 2024-05-13 | End: 2024-05-14 | Stop reason: HOSPADM

## 2024-05-12 RX ORDER — TALC
3 POWDER (GRAM) TOPICAL NIGHTLY
Status: DISCONTINUED | OUTPATIENT
Start: 2024-05-12 | End: 2024-05-14 | Stop reason: HOSPADM

## 2024-05-12 RX ORDER — FUROSEMIDE 20 MG/1
20 TABLET ORAL DAILY
Status: ON HOLD | COMMUNITY
End: 2024-05-14 | Stop reason: HOSPADM

## 2024-05-12 RX ORDER — FUROSEMIDE 40 MG/1
40 TABLET ORAL 2 TIMES DAILY
Status: ON HOLD | COMMUNITY
End: 2024-05-14 | Stop reason: HOSPADM

## 2024-05-12 RX ORDER — LACTULOSE 10 G/15ML
10 SOLUTION ORAL DAILY
Status: DISCONTINUED | OUTPATIENT
Start: 2024-05-13 | End: 2024-05-14 | Stop reason: HOSPADM

## 2024-05-12 RX ORDER — POTASSIUM CHLORIDE 20 MEQ/1
20 TABLET, EXTENDED RELEASE ORAL 2 TIMES DAILY
Status: ON HOLD | COMMUNITY
End: 2024-05-14 | Stop reason: HOSPADM

## 2024-05-12 RX ORDER — SENNOSIDES 8.6 MG/1
1 TABLET ORAL DAILY
Status: DISCONTINUED | OUTPATIENT
Start: 2024-05-13 | End: 2024-05-14 | Stop reason: HOSPADM

## 2024-05-12 RX ORDER — OXYCODONE AND ACETAMINOPHEN 5; 325 MG/1; MG/1
1 TABLET ORAL EVERY 4 HOURS PRN
Status: DISCONTINUED | OUTPATIENT
Start: 2024-05-12 | End: 2024-05-14 | Stop reason: HOSPADM

## 2024-05-12 RX ORDER — POTASSIUM CHLORIDE 20 MEQ/1
20 TABLET, EXTENDED RELEASE ORAL 2 TIMES DAILY
Status: DISCONTINUED | OUTPATIENT
Start: 2024-05-12 | End: 2024-05-14 | Stop reason: HOSPADM

## 2024-05-12 RX ORDER — ASCORBIC ACID 500 MG
500 TABLET ORAL DAILY
COMMUNITY

## 2024-05-12 RX ORDER — DONEPEZIL HYDROCHLORIDE 5 MG/1
10 TABLET, FILM COATED ORAL DAILY
Status: DISCONTINUED | OUTPATIENT
Start: 2024-05-13 | End: 2024-05-14 | Stop reason: HOSPADM

## 2024-05-12 RX ORDER — POLYETHYLENE GLYCOL 3350 17 G/17G
17 POWDER, FOR SOLUTION ORAL 2 TIMES DAILY
Status: DISCONTINUED | OUTPATIENT
Start: 2024-05-12 | End: 2024-05-14 | Stop reason: HOSPADM

## 2024-05-12 RX ORDER — TRAZODONE HYDROCHLORIDE 100 MG/1
200 TABLET ORAL NIGHTLY
COMMUNITY

## 2024-05-12 RX ORDER — CHOLECALCIFEROL (VITAMIN D3) 25 MCG
2000 TABLET ORAL DAILY
COMMUNITY

## 2024-05-12 RX ORDER — POLYETHYLENE GLYCOL 3350 17 G/17G
17 POWDER, FOR SOLUTION ORAL 2 TIMES DAILY
Status: ON HOLD | COMMUNITY
End: 2024-05-14 | Stop reason: HOSPADM

## 2024-05-12 RX ORDER — PREGABALIN 100 MG/1
100 CAPSULE ORAL 2 TIMES DAILY
COMMUNITY

## 2024-05-12 RX ORDER — MEMANTINE HYDROCHLORIDE 5 MG/1
10 TABLET ORAL 2 TIMES DAILY
Status: DISCONTINUED | OUTPATIENT
Start: 2024-05-12 | End: 2024-05-14 | Stop reason: HOSPADM

## 2024-05-12 RX ORDER — MELATONIN 5 MG
1 CAPSULE ORAL NIGHTLY
COMMUNITY

## 2024-05-12 RX ORDER — BISACODYL 10 MG/1
10 SUPPOSITORY RECTAL DAILY PRN
Status: DISCONTINUED | OUTPATIENT
Start: 2024-05-12 | End: 2024-05-14 | Stop reason: HOSPADM

## 2024-05-12 RX ORDER — ACETAMINOPHEN 325 MG/1
650 TABLET ORAL EVERY 8 HOURS PRN
Status: DISCONTINUED | OUTPATIENT
Start: 2024-05-12 | End: 2024-05-14 | Stop reason: HOSPADM

## 2024-05-12 RX ORDER — SODIUM CHLORIDE 9 MG/ML
1000 INJECTION, SOLUTION INTRAVENOUS
Status: COMPLETED | OUTPATIENT
Start: 2024-05-12 | End: 2024-05-12

## 2024-05-12 RX ORDER — HYDROCORTISONE 5 MG/1
5 TABLET ORAL DAILY
COMMUNITY

## 2024-05-12 RX ORDER — SODIUM CHLORIDE 9 MG/ML
1000 INJECTION, SOLUTION INTRAVENOUS CONTINUOUS
Status: DISCONTINUED | OUTPATIENT
Start: 2024-05-12 | End: 2024-05-13

## 2024-05-12 RX ORDER — TRAZODONE HYDROCHLORIDE 50 MG/1
200 TABLET ORAL NIGHTLY
Status: DISCONTINUED | OUTPATIENT
Start: 2024-05-12 | End: 2024-05-14 | Stop reason: HOSPADM

## 2024-05-12 RX ORDER — CHOLECALCIFEROL (VITAMIN D3) 25 MCG
2000 TABLET ORAL DAILY
Status: DISCONTINUED | OUTPATIENT
Start: 2024-05-13 | End: 2024-05-14 | Stop reason: HOSPADM

## 2024-05-12 RX ORDER — AMLODIPINE BESYLATE 5 MG/1
5 TABLET ORAL DAILY
Status: DISCONTINUED | OUTPATIENT
Start: 2024-05-13 | End: 2024-05-12

## 2024-05-12 RX ORDER — HYDROCORTISONE 5 MG/1
5 TABLET ORAL DAILY
Status: DISCONTINUED | OUTPATIENT
Start: 2024-05-13 | End: 2024-05-14 | Stop reason: HOSPADM

## 2024-05-12 RX ORDER — SODIUM CHLORIDE 0.9 % (FLUSH) 0.9 %
10 SYRINGE (ML) INJECTION EVERY 8 HOURS
Status: DISCONTINUED | OUTPATIENT
Start: 2024-05-12 | End: 2024-05-14 | Stop reason: HOSPADM

## 2024-05-12 RX ORDER — CITALOPRAM 20 MG/1
20 TABLET, FILM COATED ORAL DAILY
Status: DISCONTINUED | OUTPATIENT
Start: 2024-05-13 | End: 2024-05-14 | Stop reason: HOSPADM

## 2024-05-12 RX ORDER — DULAGLUTIDE 1.5 MG/.5ML
1.5 INJECTION, SOLUTION SUBCUTANEOUS
COMMUNITY

## 2024-05-12 RX ADMIN — MEMANTINE HYDROCHLORIDE 10 MG: 5 TABLET ORAL at 09:05

## 2024-05-12 RX ADMIN — SODIUM CHLORIDE 1000 ML: 9 INJECTION, SOLUTION INTRAVENOUS at 03:05

## 2024-05-12 RX ADMIN — POLYETHYLENE GLYCOL 3350 17 G: 17 POWDER, FOR SOLUTION ORAL at 09:05

## 2024-05-12 RX ADMIN — POTASSIUM CHLORIDE 20 MEQ: 1500 TABLET, EXTENDED RELEASE ORAL at 09:05

## 2024-05-12 RX ADMIN — SODIUM CHLORIDE, PRESERVATIVE FREE 10 ML: 5 INJECTION INTRAVENOUS at 09:05

## 2024-05-12 RX ADMIN — Medication 3 MG: at 09:05

## 2024-05-12 RX ADMIN — TRAZODONE HYDROCHLORIDE 200 MG: 50 TABLET ORAL at 09:05

## 2024-05-12 NOTE — HPI
"Fatigue        Sent from [facility] for "hypotension". Reports pt has "not been acting her normal self" today.      HPI     Thu Branch is 73 y.o. female sent from the [facility] due to AMS.  She is follwed by Dr. Vides at the [facility].  She had some transient low BP reported by nursing home, most of the BP here have been normal, except a few on soft side.  She was worked up in ER and found to have GADIEL thought to be a bit dehydrated as the etiology for her hypotension and borderline hypotension.  She has started on some IVF in the ER.  Case was discussed with DR. Truong.  Pt has a past medical history of Coronary artery disease, Depression, Hypertension, and Stroke. In ER pt c/o Fatigue (Sent from [facility] for "hypotension". Reports pt has "not been acting her normal self" today.) She thinks she has been drinking fairly well and did not know she was dehydrated.  Denies any pain, does endorse some increased LE edema bilaterally.     "

## 2024-05-12 NOTE — ASSESSMENT & PLAN NOTE
"Patient's FSGs are controlled on current medication regimen.  Last A1c reviewed-   Lab Results   Component Value Date    HGBA1C 7.1 (H) 06/29/2023     Most recent fingerstick glucose reviewed- No results for input(s): "POCTGLUCOSE" in the last 24 hours.  Current correctional scale  Low  Maintain anti-hyperglycemic dose as follows-   Antihyperglycemics (From admission, onward)      None          Hold Oral hypoglycemics while patient is in the hospital.  "

## 2024-05-12 NOTE — ASSESSMENT & PLAN NOTE
Patient with acute kidney injury/acute renal failure likely due to pre-renal azotemia due to dehydration GADIEL is currently worsening. Baseline creatinine  0.6 July 2023  - Labs reviewed- Renal function/electrolytes with Estimated Creatinine Clearance: 30 mL/min (A) (based on SCr of 1.8 mg/dL (H)). according to latest data. Monitor urine output and serial BMP and adjust therapy as needed. Avoid nephrotoxins and renally dose meds for GFR listed above.

## 2024-05-12 NOTE — ASSESSMENT & PLAN NOTE
Chronic, controlled. Latest blood pressure and vitals reviewed-     Temp:  [96.1 °F (35.6 °C)]   Pulse:  [62-68]   Resp:  [12-20]   BP: ()/()   SpO2:  [87 %-96 %] .   Home meds for hypertension were reviewed and noted below.   Hypertension Medications               amLODIPine (NORVASC) 5 MG tablet Take 1 tablet (5 mg total) by mouth once daily.    bumetanide (BUMEX) 1 MG tablet 0.5 mg once daily.    furosemide (LASIX) 20 MG tablet Take 20 mg by mouth once daily.    furosemide (LASIX) 40 MG tablet Take 40 mg by mouth 2 (two) times daily.    losartan (COZAAR) 100 MG tablet Take 1 tablet (100 mg total) by mouth once daily.            While in the hospital, will manage blood pressure as follows; Continue home antihypertensive regimen    Will utilize p.r.n. blood pressure medication only if patient's blood pressure greater than 140/90 and she develops symptoms such as worsening chest pain or shortness of breath.    Hold BP meds for now due to low side BP

## 2024-05-12 NOTE — ASSESSMENT & PLAN NOTE
CT done in ER shows stable h/o enlarged ventrcles and chornic ischemic disease and old encephalomalacia

## 2024-05-12 NOTE — ASSESSMENT & PLAN NOTE
Creatine stable for now. BMP reviewed- noted Estimated Creatinine Clearance: 30 mL/min (A) (based on SCr of 1.8 mg/dL (H)). according to latest data. Based on current GFR, CKD stage is stage 3 - GFR 30-59.  Monitor UOP and serial BMP and adjust therapy as needed. Renally dose meds. Avoid nephrotoxic medications and procedures.

## 2024-05-12 NOTE — H&P
"  [facility]  [facility] Medicine  History & Physical    Patient Name: Thu Branch  MRN: [id number]  Patient Class: OP- Observation  Admission Date: 5/12/2024  Attending Physician: Elva Gonzalez MD   Primary Care Provider: Joseph Vides MD         Patient information was obtained from patient and ER records.     Subjective:     Principal Problem:GADIEL (acute kidney injury)    Chief Complaint:   Chief Complaint   Patient presents with    Fatigue     Sent from [facility] for "hypotension". Reports pt has "not been acting her normal self" today.        HPI:   Fatigue        Sent from [facility] for "hypotension". Reports pt has "not been acting her normal self" today.      HPI     Thu Branhc is 73 y.o. female sent from the [facility] due to AMS.  She is follwed by Dr. Vides at the [facility].  She had some transient low BP reported by nursing home, most of the BP here have been normal, except a few on soft side.  She was worked up in ER and found to have GADIEL thought to be a bit dehydrated as the etiology for her hypotension and borderline hypotension.  She has started on some IVF in the ER.  Case was discussed with DR. Truong.  Pt has a past medical history of Coronary artery disease, Depression, Hypertension, and Stroke. In ER pt c/o Fatigue (Sent from [facility] for "hypotension". Reports pt has "not been acting her normal self" today.) She thinks she has been drinking fairly well and did not know she was dehydrated.  Denies any pain, does endorse some increased LE edema bilaterally.       Past Medical History:   Diagnosis Date    Coronary artery disease     Depression     Hypertension     Stroke        Past Surgical History:   Procedure Laterality Date    CORONARY ARTERY BYPASS GRAFT         Review of patient's allergies indicates:   Allergen Reactions    Adhesive tape-silicones Blisters    Chlorpheniramine-pseudoephed " Other (See Comments)    Codeine Itching    Morphine Itching     Other reaction(s): Unknown    Statins-hmg-coa reductase inhibitors Nausea Only and Rash     Other reaction(s): muscle weakness       No current facility-administered medications on file prior to encounter.     Current Outpatient Medications on File Prior to Encounter   Medication Sig    amLODIPine (NORVASC) 5 MG tablet Take 1 tablet (5 mg total) by mouth once daily.    ascorbic acid, vitamin C, (VITAMIN C) 500 MG tablet Take 500 mg by mouth once daily.    bumetanide (BUMEX) 1 MG tablet 0.5 mg once daily.    citalopram (CELEXA) 20 MG tablet 30 mg once daily.    donepeziL (ARICEPT) 10 MG tablet Take 10 mg by mouth once daily.    dulaglutide (TRULICITY) 1.5 mg/0.5 mL pen injector Inject 1.5 mg into the skin every 7 days.    furosemide (LASIX) 20 MG tablet Take 20 mg by mouth once daily.    furosemide (LASIX) 40 MG tablet Take 40 mg by mouth 2 (two) times daily.    hydrocortisone (CORTEF) 5 MG Tab Take 5 mg by mouth once daily.    linaCLOtide (LINZESS) 145 mcg Cap capsule Take 290 mcg by mouth.    losartan (COZAAR) 100 MG tablet Take 1 tablet (100 mg total) by mouth once daily.    melatonin 5 mg Cap Take 1 mg by mouth nightly.    memantine (NAMENDA) 10 MG Tab Take 1 tablet (10 mg total) by mouth 2 (two) times daily.    oxyCODONE-acetaminophen (PERCOCET) 5-325 mg per tablet 1 tablet Orally twice a day for back pain - Dx G89.4 Qty Medically Necessary Greater 7 Days for 30 days    polyethylene glycol (GLYCOLAX) 17 gram PwPk Take 17 g by mouth 2 (two) times daily.    potassium chloride SA (K-DUR,KLOR-CON) 20 MEQ tablet Take 20 mEq by mouth 2 (two) times daily.    predniSONE (DELTASONE) 10 MG tablet Take 0.5 tablets (5 mg total) by mouth once daily.    pregabalin (LYRICA) 100 MG capsule Take 100 mg by mouth 2 (two) times daily.    pregabalin (LYRICA) 50 MG capsule Take 1 capsule (50 mg total) by mouth 2 (two) times daily. (Patient taking differently: Take 100 mg  by mouth 2 (two) times daily.)    senna (SENOKOT) 8.6 mg tablet Take 1 tablet by mouth once daily.    traZODone (DESYREL) 100 MG tablet Take 200 mg by mouth every evening.    vitamin D (VITAMIN D3) 1000 units Tab Take 2,000 Units by mouth once daily.    vitamin renal formula, B-complex-vitamin c-folic acid, (NEPHROCAP) 1 mg Cap Take 1 capsule by mouth once daily.    ALPRAZolam (XANAX) 0.5 MG tablet Take 0.5 tablets (0.25 mg total) by mouth 3 (three) times daily as needed for Anxiety.    bisacodyL (DULCOLAX) 10 mg Supp Place 1 suppository (10 mg total) rectally daily as needed.    clotrimazole-betamethasone 1-0.05% (LOTRISONE) cream Apply topically 2 (two) times daily.    diphenhydrAMINE (BENADRYL) 25 mg capsule Take 1 capsule (25 mg total) by mouth every 6 (six) hours as needed for Itching or Insomnia.    lactulose (CHRONULAC) 10 gram/15 mL solution 45 mL every day Orally Once a day    methylphenidate HCl (RITALIN) 5 MG tablet Take 1 tablet (5 mg total) by mouth once daily.    [DISCONTINUED] acetaminophen (TYLENOL) 500 MG tablet Take 1 tablet (500 mg total) by mouth every 4 (four) hours as needed for Pain or Temperature greater than.     Family History    None       Tobacco Use    Smoking status: Never    Smokeless tobacco: Never   Substance and Sexual Activity    Alcohol use: Not Currently    Drug use: Never    Sexual activity: Not on file     Review of Systems   Constitutional:  Positive for activity change and fatigue. Negative for appetite change and fever.   Respiratory:  Negative for chest tightness, shortness of breath and wheezing.    Cardiovascular:  Positive for leg swelling. Negative for chest pain.   Gastrointestinal:  Negative for abdominal pain, constipation, diarrhea, nausea and vomiting.   Genitourinary:  Negative for dysuria.   Skin:  Negative for rash and wound.   Neurological:  Positive for weakness. Negative for tremors and headaches.   Psychiatric/Behavioral:  Positive for decreased  concentration. Negative for sleep disturbance. Confusion: per nursing home not at her baseline, more confused today.     Objective:     Vital Signs (Most Recent):  Temp: 96.1 °F (35.6 °C) (05/12/24 1349)  Pulse: 65 (05/12/24 1600)  Resp: 14 (05/12/24 1600)  BP: (!) 106/57 (05/12/24 1600)  SpO2: 96 % (05/12/24 1600) Vital Signs (24h Range):  Temp:  [96.1 °F (35.6 °C)] 96.1 °F (35.6 °C)  Pulse:  [62-68] 65  Resp:  [12-20] 14  SpO2:  [87 %-96 %] 96 %  BP: ()/() 106/57     Weight: 90.7 kg (200 lb)  Body mass index is 36.58 kg/m².     Physical Exam  Vitals and nursing note reviewed. Exam conducted with a chaperone present.   Constitutional:       General: She is not in acute distress.     Appearance: Normal appearance. She is normal weight. She is not ill-appearing.   HENT:      Head: Normocephalic and atraumatic.   Eyes:      General: No scleral icterus.     Extraocular Movements: Extraocular movements intact.   Cardiovascular:      Rate and Rhythm: Normal rate and regular rhythm.      Pulses: Normal pulses.      Heart sounds: Normal heart sounds.   Pulmonary:      Effort: Pulmonary effort is normal.      Breath sounds: Normal breath sounds.   Abdominal:      General: Abdomen is flat. Bowel sounds are normal.      Palpations: Abdomen is soft.   Musculoskeletal:      Right lower leg: Edema present.      Left lower leg: Edema present.      Comments: 2 + bilaterally to feet and ankles   Skin:     General: Skin is warm and dry.      Capillary Refill: Capillary refill takes less than 2 seconds.      Findings: No erythema, lesion or rash.   Neurological:      General: No focal deficit present.      Mental Status: She is alert and oriented to person, place, and time.      Comments: Awake, alert, arousable, answers questions appropriately   Psychiatric:         Mood and Affect: Mood normal.         Behavior: Behavior normal.         Thought Content: Thought content normal.      Comments: Able to answer questions  appropriately for me                 Significant Labs: All pertinent labs within the past 24 hours have been reviewed.  CBC:   Recent Labs   Lab 05/12/24  1420   WBC 7.04   HGB 12.1   HCT 39.4        CMP:   Recent Labs   Lab 05/12/24  1420      K 4.6   CO2 26   BUN 57.0*   CREATININE 1.80*   CALCIUM 11.2*   ALBUMIN 3.7   BILITOT 0.3   ALKPHOS 64   AST 12   ALT 16       Significant Imaging: I have reviewed all pertinent imaging results/findings within the past 24 hours.  Assessment/Plan:     * GADIEL (acute kidney injury)  Patient with acute kidney injury/acute renal failure likely due to pre-renal azotemia due to dehydration GADIEL is currently worsening. Baseline creatinine  0.6 July 2023  - Labs reviewed- Renal function/electrolytes with Estimated Creatinine Clearance: 30 mL/min (A) (based on SCr of 1.8 mg/dL (H)). according to latest data. Monitor urine output and serial BMP and adjust therapy as needed. Avoid nephrotoxins and renally dose meds for GFR listed above.    Hypotension  Relative  Has had normal BP and a few on lower side  Does not need pressorsat this time      Acute metabolic encephalopathy  CT done in ER shows stable h/o enlarged ventrcles and chornic ischemic disease and old encephalomalacia      Obesity  Body mass index is 36.58 kg/m². Morbid obesity complicates all aspects of disease management from diagnostic modalities to treatment. Weight loss encouraged and health benefits explained to patient.         Stage 3b chronic kidney disease  Creatine stable for now. BMP reviewed- noted Estimated Creatinine Clearance: 30 mL/min (A) (based on SCr of 1.8 mg/dL (H)). according to latest data. Based on current GFR, CKD stage is stage 3 - GFR 30-59.  Monitor UOP and serial BMP and adjust therapy as needed. Renally dose meds. Avoid nephrotoxic medications and procedures.    Essential hypertension  Chronic, controlled. Latest blood pressure and vitals reviewed-     Temp:  [96.1 °F (35.6 °C)]   Pulse:  " [62-68]   Resp:  [12-20]   BP: ()/()   SpO2:  [87 %-96 %] .   Home meds for hypertension were reviewed and noted below.   Hypertension Medications               amLODIPine (NORVASC) 5 MG tablet Take 1 tablet (5 mg total) by mouth once daily.    bumetanide (BUMEX) 1 MG tablet 0.5 mg once daily.    furosemide (LASIX) 20 MG tablet Take 20 mg by mouth once daily.    furosemide (LASIX) 40 MG tablet Take 40 mg by mouth 2 (two) times daily.    losartan (COZAAR) 100 MG tablet Take 1 tablet (100 mg total) by mouth once daily.            While in the hospital, will manage blood pressure as follows; Continue home antihypertensive regimen    Will utilize p.r.n. blood pressure medication only if patient's blood pressure greater than 140/90 and she develops symptoms such as worsening chest pain or shortness of breath.    Hold BP meds for now due to low side BP    Hypercholesterolemia   Patient is chronically on statin.will continue for now. Monitor clinically. Last LDL was No results found for: "LDLCALC"         Diabetes mellitus  Patient's FSGs are controlled on current medication regimen.  Last A1c reviewed-   Lab Results   Component Value Date    HGBA1C 7.1 (H) 06/29/2023     Most recent fingerstick glucose reviewed- No results for input(s): "POCTGLUCOSE" in the last 24 hours.  Current correctional scale  Low  Maintain anti-hyperglycemic dose as follows-   Antihyperglycemics (From admission, onward)      None          Hold Oral hypoglycemics while patient is in the hospital.      VTE Risk Mitigation (From admission, onward)           Ordered     IP VTE HIGH RISK PATIENT  Once         05/12/24 1624     Place sequential compression device  Until discontinued         05/12/24 1624                       On 05/12/2024, patient should be placed in hospital observation services under my care.             Elva Gonzalez MD  Department of Hospital Medicine  [facility] - Emergency Dept          "

## 2024-05-12 NOTE — SUBJECTIVE & OBJECTIVE
Past Medical History:   Diagnosis Date    Coronary artery disease     Depression     Hypertension     Stroke        Past Surgical History:   Procedure Laterality Date    CORONARY ARTERY BYPASS GRAFT         Review of patient's allergies indicates:   Allergen Reactions    Adhesive tape-silicones Blisters    Chlorpheniramine-pseudoephed Other (See Comments)    Codeine Itching    Morphine Itching     Other reaction(s): Unknown    Statins-hmg-coa reductase inhibitors Nausea Only and Rash     Other reaction(s): muscle weakness       No current facility-administered medications on file prior to encounter.     Current Outpatient Medications on File Prior to Encounter   Medication Sig    amLODIPine (NORVASC) 5 MG tablet Take 1 tablet (5 mg total) by mouth once daily.    ascorbic acid, vitamin C, (VITAMIN C) 500 MG tablet Take 500 mg by mouth once daily.    bumetanide (BUMEX) 1 MG tablet 0.5 mg once daily.    citalopram (CELEXA) 20 MG tablet 30 mg once daily.    donepeziL (ARICEPT) 10 MG tablet Take 10 mg by mouth once daily.    dulaglutide (TRULICITY) 1.5 mg/0.5 mL pen injector Inject 1.5 mg into the skin every 7 days.    furosemide (LASIX) 20 MG tablet Take 20 mg by mouth once daily.    furosemide (LASIX) 40 MG tablet Take 40 mg by mouth 2 (two) times daily.    hydrocortisone (CORTEF) 5 MG Tab Take 5 mg by mouth once daily.    linaCLOtide (LINZESS) 145 mcg Cap capsule Take 290 mcg by mouth.    losartan (COZAAR) 100 MG tablet Take 1 tablet (100 mg total) by mouth once daily.    melatonin 5 mg Cap Take 1 mg by mouth nightly.    memantine (NAMENDA) 10 MG Tab Take 1 tablet (10 mg total) by mouth 2 (two) times daily.    oxyCODONE-acetaminophen (PERCOCET) 5-325 mg per tablet 1 tablet Orally twice a day for back pain - Dx G89.4 Qty Medically Necessary Greater 7 Days for 30 days    polyethylene glycol (GLYCOLAX) 17 gram PwPk Take 17 g by mouth 2 (two) times daily.    potassium chloride SA (K-DUR,KLOR-CON) 20 MEQ tablet Take 20 mEq  by mouth 2 (two) times daily.    predniSONE (DELTASONE) 10 MG tablet Take 0.5 tablets (5 mg total) by mouth once daily.    pregabalin (LYRICA) 100 MG capsule Take 100 mg by mouth 2 (two) times daily.    pregabalin (LYRICA) 50 MG capsule Take 1 capsule (50 mg total) by mouth 2 (two) times daily. (Patient taking differently: Take 100 mg by mouth 2 (two) times daily.)    senna (SENOKOT) 8.6 mg tablet Take 1 tablet by mouth once daily.    traZODone (DESYREL) 100 MG tablet Take 200 mg by mouth every evening.    vitamin D (VITAMIN D3) 1000 units Tab Take 2,000 Units by mouth once daily.    vitamin renal formula, B-complex-vitamin c-folic acid, (NEPHROCAP) 1 mg Cap Take 1 capsule by mouth once daily.    ALPRAZolam (XANAX) 0.5 MG tablet Take 0.5 tablets (0.25 mg total) by mouth 3 (three) times daily as needed for Anxiety.    bisacodyL (DULCOLAX) 10 mg Supp Place 1 suppository (10 mg total) rectally daily as needed.    clotrimazole-betamethasone 1-0.05% (LOTRISONE) cream Apply topically 2 (two) times daily.    diphenhydrAMINE (BENADRYL) 25 mg capsule Take 1 capsule (25 mg total) by mouth every 6 (six) hours as needed for Itching or Insomnia.    lactulose (CHRONULAC) 10 gram/15 mL solution 45 mL every day Orally Once a day    methylphenidate HCl (RITALIN) 5 MG tablet Take 1 tablet (5 mg total) by mouth once daily.    [DISCONTINUED] acetaminophen (TYLENOL) 500 MG tablet Take 1 tablet (500 mg total) by mouth every 4 (four) hours as needed for Pain or Temperature greater than.     Family History    None       Tobacco Use    Smoking status: Never    Smokeless tobacco: Never   Substance and Sexual Activity    Alcohol use: Not Currently    Drug use: Never    Sexual activity: Not on file     Review of Systems   Constitutional:  Positive for activity change and fatigue. Negative for appetite change and fever.   Respiratory:  Negative for chest tightness, shortness of breath and wheezing.    Cardiovascular:  Positive for leg swelling.  Negative for chest pain.   Gastrointestinal:  Negative for abdominal pain, constipation, diarrhea, nausea and vomiting.   Genitourinary:  Negative for dysuria.   Skin:  Negative for rash and wound.   Neurological:  Positive for weakness. Negative for tremors and headaches.   Psychiatric/Behavioral:  Positive for decreased concentration. Negative for sleep disturbance. Confusion: per nursing home not at her baseline, more confused today.     Objective:     Vital Signs (Most Recent):  Temp: 96.1 °F (35.6 °C) (05/12/24 1349)  Pulse: 65 (05/12/24 1600)  Resp: 14 (05/12/24 1600)  BP: (!) 106/57 (05/12/24 1600)  SpO2: 96 % (05/12/24 1600) Vital Signs (24h Range):  Temp:  [96.1 °F (35.6 °C)] 96.1 °F (35.6 °C)  Pulse:  [62-68] 65  Resp:  [12-20] 14  SpO2:  [87 %-96 %] 96 %  BP: ()/() 106/57     Weight: 90.7 kg (200 lb)  Body mass index is 36.58 kg/m².     Physical Exam  Vitals and nursing note reviewed. Exam conducted with a chaperone present.   Constitutional:       General: She is not in acute distress.     Appearance: Normal appearance. She is normal weight. She is not ill-appearing.   HENT:      Head: Normocephalic and atraumatic.   Eyes:      General: No scleral icterus.     Extraocular Movements: Extraocular movements intact.   Cardiovascular:      Rate and Rhythm: Normal rate and regular rhythm.      Pulses: Normal pulses.      Heart sounds: Normal heart sounds.   Pulmonary:      Effort: Pulmonary effort is normal.      Breath sounds: Normal breath sounds.   Abdominal:      General: Abdomen is flat. Bowel sounds are normal.      Palpations: Abdomen is soft.   Musculoskeletal:      Right lower leg: Edema present.      Left lower leg: Edema present.      Comments: 2 + bilaterally to feet and ankles   Skin:     General: Skin is warm and dry.      Capillary Refill: Capillary refill takes less than 2 seconds.      Findings: No erythema, lesion or rash.   Neurological:      General: No focal deficit present.       Mental Status: She is alert and oriented to person, place, and time.      Comments: Awake, alert, arousable, answers questions appropriately   Psychiatric:         Mood and Affect: Mood normal.         Behavior: Behavior normal.         Thought Content: Thought content normal.      Comments: Able to answer questions appropriately for me                 Significant Labs: All pertinent labs within the past 24 hours have been reviewed.  CBC:   Recent Labs   Lab 05/12/24  1420   WBC 7.04   HGB 12.1   HCT 39.4        CMP:   Recent Labs   Lab 05/12/24  1420      K 4.6   CO2 26   BUN 57.0*   CREATININE 1.80*   CALCIUM 11.2*   ALBUMIN 3.7   BILITOT 0.3   ALKPHOS 64   AST 12   ALT 16       Significant Imaging: I have reviewed all pertinent imaging results/findings within the past 24 hours.

## 2024-05-12 NOTE — ED PROVIDER NOTES
"Encounter Date: 5/12/2024  History from medics and patient     History     Chief Complaint   Patient presents with    Fatigue     Sent from [facility] for "hypotension". Reports pt has "not been acting her normal self" today.     HPI    Thu Branch is 73 y.o. female who  has a past medical history of Coronary artery disease, Depression, Hypertension, and Stroke. arrives in ER with c/o Fatigue (Sent from [facility] for "hypotension". Reports pt has "not been acting her normal self" today.)    Review of patient's allergies indicates:   Allergen Reactions    Adhesive tape-silicones Blisters    Chlorpheniramine-pseudoephed Other (See Comments)    Codeine Itching    Morphine Itching     Other reaction(s): Unknown    Statins-hmg-coa reductase inhibitors Nausea Only and Rash     Other reaction(s): muscle weakness     Past Medical History:   Diagnosis Date    Coronary artery disease     Depression     Hypertension     Stroke      Past Surgical History:   Procedure Laterality Date    CORONARY ARTERY BYPASS GRAFT       No family history on file.  Social History     Tobacco Use    Smoking status: Never    Smokeless tobacco: Never   Substance Use Topics    Alcohol use: Not Currently    Drug use: Never     Review of Systems   Constitutional:  Negative for fever.   HENT:  Negative for trouble swallowing and voice change.    Eyes:  Negative for visual disturbance.   Respiratory:  Negative for cough and shortness of breath.    Cardiovascular:  Negative for chest pain.   Gastrointestinal:  Negative for abdominal pain, diarrhea and vomiting.   Genitourinary:  Negative for dysuria and hematuria.   Musculoskeletal:  Negative for back pain and gait problem.   Skin:  Negative for color change and rash.   Neurological:  Negative for headaches.   Psychiatric/Behavioral:  Negative for behavioral problems and sleep disturbance.    All other systems reviewed and are negative.      Physical Exam     Initial " Vitals   BP Pulse Resp Temp SpO2   05/12/24 1348 05/12/24 1348 05/12/24 1348 05/12/24 1349 05/12/24 1348   (!) 145/51 68 20 96.1 °F (35.6 °C) (!) 94 %      MAP       --                Physical Exam    Nursing note and vitals reviewed.  Constitutional: She appears well-developed and well-nourished.   HENT:   Head: Atraumatic.   Eyes: EOM are normal. Pupils are equal, round, and reactive to light.   Neck: Neck supple.   Cardiovascular:  Normal rate and regular rhythm.           Pulmonary/Chest: Breath sounds normal. No respiratory distress. She has no wheezes. She has no rales.   Abdominal: Abdomen is soft. Bowel sounds are normal. She exhibits no distension. There is no abdominal tenderness.   Musculoskeletal:         General: No tenderness or edema.      Cervical back: Neck supple.      Comments: Morbidly obese patient with bed-bound status     Neurological: She is alert. GCS score is 15. GCS eye subscore is 4. GCS verbal subscore is 5. GCS motor subscore is 6.   Skin: Skin is dry.   Psychiatric: She has a normal mood and affect.         ED Course   Procedures  Orders Placed This Encounter   Procedures    Blood culture #1 **CANNOT BE ORDERED STAT**    Blood culture #2 **CANNOT BE ORDERED STAT**    X-ray Chest AP Portable    CT Head Without Contrast    Comprehensive metabolic panel    CBC auto differential    Troponin I    Brain natriuretic peptide    CBC with Differential    Lactic acid, plasma    Diet NPO    Vital signs    Cardiac Monitoring - Adult    DNR (Do Not Resuscitate)    Oxygen Continuous    Pulse Oximetry Continuous    EKG 12-LEAD    Insert saline lock    Place in Observation     Medications   0.9%  NaCl infusion (has no administration in time range)     Admission on 05/12/2024   Component Date Value Ref Range Status    Sodium 05/12/2024 142  136 - 145 mmol/L Final    Potassium 05/12/2024 4.6  3.5 - 5.1 mmol/L Final    Chloride 05/12/2024 109 (H)  98 - 107 mmol/L Final    CO2 05/12/2024 26  23 - 31 mmol/L  Final    Glucose 05/12/2024 96  82 - 115 mg/dL Final    Blood Urea Nitrogen 05/12/2024 57.0 (H)  9.8 - 20.1 mg/dL Final    Creatinine 05/12/2024 1.80 (H)  0.55 - 1.02 mg/dL Final    Calcium 05/12/2024 11.2 (H)  8.4 - 10.2 mg/dL Final    Protein Total 05/12/2024 7.7 (H)  5.8 - 7.6 gm/dL Final    Albumin 05/12/2024 3.7  3.4 - 4.8 g/dL Final    Globulin 05/12/2024 4.0 (H)  2.4 - 3.5 gm/dL Final    Albumin/Globulin Ratio 05/12/2024 0.9 (L)  1.1 - 2.0 ratio Final    Bilirubin Total 05/12/2024 0.3  <=1.5 mg/dL Final    ALP 05/12/2024 64  40 - 150 unit/L Final    ALT 05/12/2024 16  0 - 55 unit/L Final    AST 05/12/2024 12  5 - 34 unit/L Final    eGFR 05/12/2024 30  mL/min/1.73/m2 Final    Troponin-I 05/12/2024 <0.010  0.000 - 0.045 ng/mL Final    Natriuretic Peptide 05/12/2024 35.2  <=100.0 pg/mL Final    WBC 05/12/2024 7.04  4.50 - 11.50 x10(3)/mcL Final    RBC 05/12/2024 4.45  4.20 - 5.40 x10(6)/mcL Final    Hgb 05/12/2024 12.1  12.0 - 16.0 g/dL Final    Hct 05/12/2024 39.4  37.0 - 47.0 % Final    MCV 05/12/2024 88.5  80.0 - 94.0 fL Final    MCH 05/12/2024 27.2  27.0 - 31.0 pg Final    MCHC 05/12/2024 30.7 (L)  33.0 - 36.0 g/dL Final    RDW 05/12/2024 14.9  11.5 - 17.0 % Final    Platelet 05/12/2024 259  130 - 400 x10(3)/mcL Final    MPV 05/12/2024 11.7 (H)  7.4 - 10.4 fL Final    Neut % 05/12/2024 62.8  % Final    Lymph % 05/12/2024 22.3  % Final    Mono % 05/12/2024 7.0  % Final    Eos % 05/12/2024 6.4  % Final    Basophil % 05/12/2024 1.1  % Final    Lymph # 05/12/2024 1.57  0.6 - 4.6 x10(3)/mcL Final    Neut # 05/12/2024 4.42  2.1 - 9.2 x10(3)/mcL Final    Mono # 05/12/2024 0.49  0.1 - 1.3 x10(3)/mcL Final    Eos # 05/12/2024 0.45  0 - 0.9 x10(3)/mcL Final    Baso # 05/12/2024 0.08  <=0.2 x10(3)/mcL Final    IG# 05/12/2024 0.03  0 - 0.04 x10(3)/mcL Final    IG% 05/12/2024 0.4  % Final    Lactic Acid Level 05/12/2024 0.7  0.5 - 2.2 mmol/L Final       Labs Reviewed   COMPREHENSIVE METABOLIC PANEL - Abnormal; Notable  for the following components:       Result Value    Chloride 109 (*)     Blood Urea Nitrogen 57.0 (*)     Creatinine 1.80 (*)     Calcium 11.2 (*)     Protein Total 7.7 (*)     Globulin 4.0 (*)     Albumin/Globulin Ratio 0.9 (*)     All other components within normal limits   CBC WITH DIFFERENTIAL - Abnormal; Notable for the following components:    MCHC 30.7 (*)     MPV 11.7 (*)     All other components within normal limits   TROPONIN I - Normal   B-TYPE NATRIURETIC PEPTIDE - Normal   LACTIC ACID, PLASMA - Normal   BLOOD CULTURE OLG   BLOOD CULTURE OLG   CBC W/ AUTO DIFFERENTIAL    Narrative:     The following orders were created for panel order CBC auto differential.  Procedure                               Abnormality         Status                     ---------                               -----------         ------                     CBC with Differential[[id number]]       Abnormal            Final result                 Please view results for these tests on the individual orders.          Imaging Results              CT Head Without Contrast (Final result)  Result time 05/12/24 14:37:34      Final result by Avelino Means MD (05/12/24 14:37:34)                   Impression:      1. No acute intracranial abnormality.  2. Stable prominence of the ventricular system which appears out of proportion to the degree of sulcal enlargement.  Similar findings can be seen in the setting of normal pressure hydrocephalus.  Stable area of encephalomalacia in the left temporoparietal region.  Generalized cerebral volume loss and chronic microvascular ischemic change.      Electronically signed by: Avelino Means  Date:    05/12/2024  Time:    14:37               Narrative:    EXAMINATION:  CT HEAD WITHOUT CONTRAST    CLINICAL HISTORY:  Mental status change, unknown cause;    TECHNIQUE:  Low dose axial CT images obtained throughout the head without intravenous contrast. Sagittal and coronal reconstructions were  performed.    COMPARISON:  None.    FINDINGS:  Intracranial compartment:    Generalized cerebral volume loss.  Stable prominence of the ventricular system which appears out of proportion to the degree of sulcal enlargement.  No extra-axial blood or fluid collections.  Embolization coils again noted in the suprasellar cistern with associated streak artifact which somewhat limits evaluation.    Stable area of encephalomalacia in the left temporoparietal region.  Patchy hypoattenuation throughout the supratentorial white matter most commonly reflects chronic microvascular ischemic change.  No acute parenchymal hemorrhage or evolving major vascular distribution infarct identified.  No intracranial mass effect or midline shift.    Skull/extracranial contents (limited evaluation): No fracture. Small amount of aerated secretions in the right sphenoid sinus.  Mastoid air cells are essentially clear.                                       X-ray Chest AP Portable (Final result)  Result time 05/12/24 14:39:33      Final result by Avelino Means MD (05/12/24 14:39:33)                   Impression:      No consolidation or evidence of acute cardiac decompensation.      Electronically signed by: Avelino Means  Date:    05/12/2024  Time:    14:39               Narrative:    EXAMINATION:  XR CHEST AP PORTABLE    CLINICAL HISTORY:  Weakness;    TECHNIQUE:  Single frontal view of the chest was performed.    COMPARISON:  Radiograph 06/28/2023    FINDINGS:  Examination limited by patient positioning.  Postsurgical changes of CABG.  The cardiomediastinal silhouette is normal in size allowing for patient rotation.  No consolidation, pneumothorax or large pleural effusion.  No acute osseous abnormality identified.  Postsurgical changes of the spine MR right shoulder and left humerus.                                       Medications   0.9%  NaCl infusion (has no administration in time range)     Medical Decision Making  Amount and/or  Complexity of Data Reviewed  Labs: ordered. Decision-making details documented in ED Course.  Radiology: ordered.    Risk  Prescription drug management.               ED Course as of 05/12/24 1528   Sun May 12, 2024   1517 BUN(!): 57.0 [GQ]   1517 Creatinine(!): 1.80  Patient has a normal white blood cell count, her hemoglobin and hematocrit is normal, her bicarb is normal, she does have elevated BUN of 57 and creatinine 1.8, chest x-ray does not show any pneumonia, her lactic acid is normal, her cardiac enzymes are normal, her EKG does not show any acute ischemic changes, she is feeling a little cold though, her temperature is in normal range, we have put her on some warming blanket, I am going to give her IV fluids for dehydration, I will have to admit her in the hospital till she improves, I will talk to the hospitalist for possible admission. [GQ]   1523 Mainly patient is dehydrated, I will admit her in the hospital give her IV fluids, and re-evaluate, I talked to Dr. Ribeiro and she is okay with admission.  Patient is on bumetanide I am going to stop that for the time being. [GQ]      ED Course User Index  [GQ] Helena Truong MD                           Clinical Impression:  Final diagnoses:  [R53.1] Generalized weakness  [E86.0] Dehydration (Primary)          ED Disposition Condition    Observation Stable                Helena Truong MD  05/12/24 1528

## 2024-05-13 LAB
ALBUMIN SERPL-MCNC: 3.2 G/DL (ref 3.4–4.8)
ALBUMIN/GLOB SERPL: 0.9 RATIO (ref 1.1–2)
ALP SERPL-CCNC: 62 UNIT/L (ref 40–150)
ALT SERPL-CCNC: 14 UNIT/L (ref 0–55)
AST SERPL-CCNC: 9 UNIT/L (ref 5–34)
BACTERIA #/AREA URNS AUTO: ABNORMAL /HPF
BASOPHILS # BLD AUTO: 0.06 X10(3)/MCL
BASOPHILS NFR BLD AUTO: 0.9 %
BILIRUB SERPL-MCNC: 0.3 MG/DL
BILIRUB UR QL STRIP.AUTO: NEGATIVE
BUN SERPL-MCNC: 53 MG/DL (ref 9.8–20.1)
CALCIUM SERPL-MCNC: 10.1 MG/DL (ref 8.4–10.2)
CHLORIDE SERPL-SCNC: 111 MMOL/L (ref 98–107)
CLARITY UR: ABNORMAL
CO2 SERPL-SCNC: 23 MMOL/L (ref 23–31)
COLOR UR AUTO: YELLOW
CREAT SERPL-MCNC: 1.52 MG/DL (ref 0.55–1.02)
EOSINOPHIL # BLD AUTO: 0.42 X10(3)/MCL (ref 0–0.9)
EOSINOPHIL NFR BLD AUTO: 6.2 %
ERYTHROCYTE [DISTWIDTH] IN BLOOD BY AUTOMATED COUNT: 14.9 % (ref 11.5–17)
GFR SERPLBLD CREATININE-BSD FMLA CKD-EPI: 37 ML/MIN/1.73/M2
GLOBULIN SER-MCNC: 3.4 GM/DL (ref 2.4–3.5)
GLUCOSE SERPL-MCNC: 73 MG/DL (ref 82–115)
GLUCOSE UR QL STRIP: NEGATIVE
HCT VFR BLD AUTO: 36 % (ref 37–47)
HGB BLD-MCNC: 11 G/DL (ref 12–16)
HGB UR QL STRIP: ABNORMAL
IMM GRANULOCYTES # BLD AUTO: 0.02 X10(3)/MCL (ref 0–0.04)
IMM GRANULOCYTES NFR BLD AUTO: 0.3 %
KETONES UR QL STRIP: NEGATIVE
LEUKOCYTE ESTERASE UR QL STRIP: ABNORMAL
LYMPHOCYTES # BLD AUTO: 1.44 X10(3)/MCL (ref 0.6–4.6)
LYMPHOCYTES NFR BLD AUTO: 21.2 %
MCH RBC QN AUTO: 27.2 PG (ref 27–31)
MCHC RBC AUTO-ENTMCNC: 30.6 G/DL (ref 33–36)
MCV RBC AUTO: 89.1 FL (ref 80–94)
MONOCYTES # BLD AUTO: 0.48 X10(3)/MCL (ref 0.1–1.3)
MONOCYTES NFR BLD AUTO: 7.1 %
NEUTROPHILS # BLD AUTO: 4.36 X10(3)/MCL (ref 2.1–9.2)
NEUTROPHILS NFR BLD AUTO: 64.3 %
NITRITE UR QL STRIP: NEGATIVE
PH UR STRIP: >=9 [PH]
PLATELET # BLD AUTO: 241 X10(3)/MCL (ref 130–400)
PMV BLD AUTO: 12.2 FL (ref 7.4–10.4)
POCT GLUCOSE: 125 MG/DL (ref 70–110)
POTASSIUM SERPL-SCNC: 4.5 MMOL/L (ref 3.5–5.1)
PROT SERPL-MCNC: 6.6 GM/DL (ref 5.8–7.6)
PROT UR QL STRIP: ABNORMAL
RBC # BLD AUTO: 4.04 X10(6)/MCL (ref 4.2–5.4)
RBC #/AREA URNS AUTO: ABNORMAL /HPF
SODIUM SERPL-SCNC: 141 MMOL/L (ref 136–145)
SP GR UR STRIP.AUTO: 1.01 (ref 1–1.03)
SQUAMOUS #/AREA URNS AUTO: ABNORMAL /HPF
UROBILINOGEN UR STRIP-ACNC: 0.2
WBC # SPEC AUTO: 6.78 X10(3)/MCL (ref 4.5–11.5)
WBC #/AREA URNS AUTO: ABNORMAL /HPF

## 2024-05-13 PROCEDURE — 25000003 PHARM REV CODE 250: Performed by: INTERNAL MEDICINE

## 2024-05-13 PROCEDURE — 81001 URINALYSIS AUTO W/SCOPE: CPT | Performed by: FAMILY MEDICINE

## 2024-05-13 PROCEDURE — 63600175 PHARM REV CODE 636 W HCPCS: Performed by: EMERGENCY MEDICINE

## 2024-05-13 PROCEDURE — 25000003 PHARM REV CODE 250: Performed by: EMERGENCY MEDICINE

## 2024-05-13 PROCEDURE — A4216 STERILE WATER/SALINE, 10 ML: HCPCS | Performed by: INTERNAL MEDICINE

## 2024-05-13 PROCEDURE — 85025 COMPLETE CBC W/AUTO DIFF WBC: CPT | Performed by: INTERNAL MEDICINE

## 2024-05-13 PROCEDURE — 94761 N-INVAS EAR/PLS OXIMETRY MLT: CPT

## 2024-05-13 PROCEDURE — 36415 COLL VENOUS BLD VENIPUNCTURE: CPT | Performed by: INTERNAL MEDICINE

## 2024-05-13 PROCEDURE — 21400001 HC TELEMETRY ROOM

## 2024-05-13 PROCEDURE — 87086 URINE CULTURE/COLONY COUNT: CPT | Performed by: FAMILY MEDICINE

## 2024-05-13 PROCEDURE — 80053 COMPREHEN METABOLIC PANEL: CPT | Performed by: INTERNAL MEDICINE

## 2024-05-13 PROCEDURE — 25000003 PHARM REV CODE 250: Performed by: FAMILY MEDICINE

## 2024-05-13 PROCEDURE — 11000001 HC ACUTE MED/SURG PRIVATE ROOM

## 2024-05-13 RX ORDER — ENOXAPARIN SODIUM 100 MG/ML
30 INJECTION SUBCUTANEOUS EVERY 24 HOURS
Status: DISCONTINUED | OUTPATIENT
Start: 2024-05-13 | End: 2024-05-14 | Stop reason: HOSPADM

## 2024-05-13 RX ORDER — SODIUM CHLORIDE 9 MG/ML
1000 INJECTION, SOLUTION INTRAVENOUS CONTINUOUS
Status: DISCONTINUED | OUTPATIENT
Start: 2024-05-13 | End: 2024-05-14

## 2024-05-13 RX ORDER — FAMOTIDINE 20 MG/1
20 TABLET, FILM COATED ORAL 2 TIMES DAILY
Status: DISCONTINUED | OUTPATIENT
Start: 2024-05-13 | End: 2024-05-14 | Stop reason: HOSPADM

## 2024-05-13 RX ADMIN — LINACLOTIDE 290 MCG: 145 CAPSULE, GELATIN COATED ORAL at 07:05

## 2024-05-13 RX ADMIN — Medication 3 MG: at 09:05

## 2024-05-13 RX ADMIN — SODIUM CHLORIDE, PRESERVATIVE FREE 10 ML: 5 INJECTION INTRAVENOUS at 05:05

## 2024-05-13 RX ADMIN — CITALOPRAM HYDROBROMIDE 20 MG: 20 TABLET ORAL at 09:05

## 2024-05-13 RX ADMIN — POTASSIUM CHLORIDE 20 MEQ: 1500 TABLET, EXTENDED RELEASE ORAL at 09:05

## 2024-05-13 RX ADMIN — SODIUM CHLORIDE, PRESERVATIVE FREE 10 ML: 5 INJECTION INTRAVENOUS at 06:05

## 2024-05-13 RX ADMIN — DONEPEZIL HYDROCHLORIDE 10 MG: 5 TABLET, FILM COATED ORAL at 09:05

## 2024-05-13 RX ADMIN — LACTULOSE 10 G: 20 SOLUTION ORAL at 09:05

## 2024-05-13 RX ADMIN — POLYETHYLENE GLYCOL 3350 17 G: 17 POWDER, FOR SOLUTION ORAL at 09:05

## 2024-05-13 RX ADMIN — SODIUM CHLORIDE 1000 ML: 9 INJECTION, SOLUTION INTRAVENOUS at 12:05

## 2024-05-13 RX ADMIN — SODIUM CHLORIDE 1000 ML: 9 INJECTION, SOLUTION INTRAVENOUS at 09:05

## 2024-05-13 RX ADMIN — SODIUM CHLORIDE, PRESERVATIVE FREE 10 ML: 5 INJECTION INTRAVENOUS at 10:05

## 2024-05-13 RX ADMIN — ENOXAPARIN SODIUM 30 MG: 30 INJECTION SUBCUTANEOUS at 05:05

## 2024-05-13 RX ADMIN — TRAZODONE HYDROCHLORIDE 200 MG: 50 TABLET ORAL at 09:05

## 2024-05-13 RX ADMIN — CEFTRIAXONE SODIUM 1 G: 1 INJECTION, POWDER, FOR SOLUTION INTRAMUSCULAR; INTRAVENOUS at 09:05

## 2024-05-13 RX ADMIN — MEMANTINE HYDROCHLORIDE 10 MG: 5 TABLET ORAL at 09:05

## 2024-05-13 RX ADMIN — ASCORBIC ACID, THIAMINE MONONITRATE,RIBOFLAVIN, NIACINAMIDE, PYRIDOXINE HYDROCHLORIDE, FOLIC ACID, CYANOCOBALAMIN, BIOTIN, CALCIUM PANTOTHENATE, 1 CAPSULE: 100; 1.5; 1.7; 20; 10; 1; 6000; 150000; 5 CAPSULE, LIQUID FILLED ORAL at 09:05

## 2024-05-13 RX ADMIN — SENNOSIDES 1 TABLET: 8.6 TABLET, FILM COATED ORAL at 09:05

## 2024-05-13 RX ADMIN — Medication 500 MG: at 09:05

## 2024-05-13 RX ADMIN — FAMOTIDINE 20 MG: 20 TABLET ORAL at 09:05

## 2024-05-13 RX ADMIN — FAMOTIDINE 20 MG: 20 TABLET ORAL at 12:05

## 2024-05-13 RX ADMIN — HYDROCORTISONE 5 MG: 5 TABLET ORAL at 09:05

## 2024-05-13 RX ADMIN — CHOLECALCIFEROL TAB 25 MCG (1000 UNIT) 2000 UNITS: 25 TAB at 09:05

## 2024-05-13 NOTE — PLAN OF CARE
Problem: Adult Inpatient Plan of Care  Goal: Plan of Care Review  Outcome: Progressing  Goal: Patient-Specific Goal (Individualized)  Outcome: Progressing  Goal: Absence of Hospital-Acquired Illness or Injury  Outcome: Progressing  Goal: Optimal Comfort and Wellbeing  Outcome: Progressing  Goal: Readiness for Transition of Care  Outcome: Progressing     Problem: Diabetes Comorbidity  Goal: Blood Glucose Level Within Targeted Range  Outcome: Progressing     Problem: Acute Kidney Injury/Impairment  Goal: Fluid and Electrolyte Balance  Outcome: Progressing  Goal: Improved Oral Intake  Outcome: Progressing  Goal: Effective Renal Function  Outcome: Progressing     Problem: Skin Injury Risk Increased  Goal: Skin Health and Integrity  Outcome: Progressing     Problem: Comorbidity Management  Goal: Blood Pressure in Desired Range  Outcome: Progressing     Problem: Fatigue  Goal: Improved Activity Tolerance  Outcome: Progressing     Problem: Fall Injury Risk  Goal: Absence of Fall and Fall-Related Injury  Outcome: Progressing     Problem: Fluid Volume Deficit  Goal: Fluid Balance  Outcome: Progressing     Problem: Chronic Kidney Disease  Goal: Optimal Coping with Chronic Illness  Outcome: Progressing  Goal: Electrolyte Balance  Outcome: Progressing  Goal: Fluid Balance  Outcome: Progressing  Goal: Optimal Functional Ability  Outcome: Progressing  Goal: Absence of Anemia Signs and Symptoms  Outcome: Progressing  Goal: Optimal Oral Intake  Outcome: Progressing  Goal: Acceptable Pain Control  Outcome: Progressing  Goal: Minimize Renal Failure Effects  Outcome: Progressing

## 2024-05-13 NOTE — PLAN OF CARE
05/13/24 1450   Discharge Assessment   Assessment Type Discharge Planning Assessment   Source of Information health record   People in Home facility resident   Facility Arrived From: [facility] Resident   Do you expect to return to your current living situation? Yes   Equipment Currently Used at Home none   Are you on dialysis? No   Do you take coumadin? No   Discharge Plan A Return to nursing home   Discharge Plan B Return to Nursing Home   DME Needed Upon Discharge  none   Discharge Plan discussed with: Patient   Transition of Care Barriers None   Physical Activity   On average, how many days per week do you engage in moderate to strenuous exercise (like a brisk walk)? Pt Declined   On average, how many minutes do you engage in exercise at this level? Pt Declined   Financial Resource Strain   How hard is it for you to pay for the very basics like food, housing, medical care, and heating? Pt Declined   Housing Stability   In the last 12 months, was there a time when you were not able to pay the mortgage or rent on time? Pt Declined   At any time in the past 12 months, were you homeless or living in a shelter (including now)? Pt Declined   Transportation Needs   Has the lack of transportation kept you from medical appointments, meetings, work or from getting things needed for daily living? Patient declined   Food Insecurity   Within the past 12 months, you worried that your food would run out before you got the money to buy more. Pt Declined   Within the past 12 months, the food you bought just didn't last and you didn't have money to get more. Pt Declined   Stress   Do you feel stress - tense, restless, nervous, or anxious, or unable to sleep at night because your mind is troubled all the time - these days? Pt Declined   Social Isolation   How often do you feel lonely or isolated from those around you?  Patient declined   Alcohol Use   Q1: How often do you have a drink containing alcohol? Pt Declined   Q2: How  many drinks containing alcohol do you have on a typical day when you are drinking? Pt Declined   Q3: How often do you have six or more drinks on one occasion? Pt Declined   Utilities   In the past 12 months has the electric, gas, oil, or water company threatened to shut off services in your home? Pt Declined   Health Literacy   How often do you need to have someone help you when you read instructions, pamphlets, or other written material from your doctor or pharmacy? Patient declines to respond     Resident of [facility].  Updates sent.

## 2024-05-13 NOTE — PROGRESS NOTES
"      [facility] - Medical Surgical Unit  Hospital Medicine  Progress Note    Patient Name: Thu Branch  MRN: [id number]  Patient Class: OP- Observation   Admission Date: 5/12/2024  Length of Stay: 0 days  Attending Physician: Elva Goznalez MD  Primary Care Provider: Joseph Vides MD        Subjective:     Principal Problem:GADIEL (acute kidney injury)    Interval History:   [facility]  Thu Branch is 73 y.o. female sent from the [facility] due to AMS.  She is follwed by Dr. Vides at the [facility].  She had some transient low BP reported by nursing home, most of the BP here have been normal, except a few on soft side.  She was worked up in ER and found to have GADIEL thought to be a bit dehydrated as the etiology for her hypotension and borderline hypotension.  She has started on some IVF in the ER.  Case was discussed with DR. Truong.  Pt has a past medical history of Coronary artery disease, Depression, Hypertension, and Stroke. In ER pt c/o Fatigue (Sent from [facility] for "hypotension". Reports pt has "not been acting her normal self" today.) She thinks she has been drinking fairly well and did not know she was dehydrated.  Denies any pain, does endorse some increased LE edema bilaterally.    5/13-when seen on rounds this morning was lying comfortably in bed; she was awake, able to respond to simple questions; she stated she did not feel good but could not specify what was wrong; she denied feeling short of breath, denied pain    Objective:     Vital Signs (Most Recent):  Temp: 98.2 °F (36.8 °C) (05/13/24 1115)  Pulse: 71 (05/13/24 1115)  Resp: 20 (05/13/24 1115)  BP: (!) 122/51 (05/13/24 1115)  SpO2: (!) 93 % (05/13/24 1115) Vital Signs (24h Range):  Temp:  [96.1 °F (35.6 °C)-98.2 °F (36.8 °C)] 98.2 °F (36.8 °C)  Pulse:  [60-71] 71  Resp:  [12-20] 20  SpO2:  [87 %-96 %] 93 %  BP: ()/() 122/51     Weight: 92.3 kg (203 lb 7.8 oz)  Body mass index is " "37.22 kg/m².    Intake/Output Summary (Last 24 hours) at 5/13/2024 1155  Last data filed at 5/13/2024 0643  Gross per 24 hour   Intake --   Output 725 ml   Net -725 ml      Physical exam  Constitution-obese, normally developed female in NAD  Eyes-PERRL, EOMI  HENT-normocephalic, atraumatic  Neck-supple  Respiratory-normal respirations; CTA with good air movement  Heart-RRR; normal S1 and S2; no murmurs, gallops  Abdomen-soft, nontender, nondistended  Genitourinary-deferred  Musculoskeletal-contractures in bilateral hands, bilateral pedal edema; disuse atrophy in lower extremities  Skin-warm, dry; no rashes  Neurologic-alert and oriented to person and place; minimal use of upper extremities; she is bed ridden    Scheduled Meds:   ascorbic acid (vitamin C)  500 mg Oral Daily    cefTRIAXone (Rocephin) IV (PEDS and ADULTS)  1 g Intravenous Q24H    citalopram  20 mg Oral Daily    donepeziL  10 mg Oral Daily    enoxparin  30 mg Subcutaneous Q24H (prophylaxis, 1700)    famotidine  20 mg Oral BID    hydrocortisone  5 mg Oral Daily    lactulose  10 g Oral Daily    linaCLOtide  290 mcg Oral Before breakfast    melatonin  3 mg Oral Nightly    memantine  10 mg Oral BID    polyethylene glycol  17 g Oral BID    potassium chloride SA  20 mEq Oral BID    senna  1 tablet Oral Daily    sodium chloride 0.9%  10 mL Intravenous Q8H    traZODone  200 mg Oral QHS    vitamin D  2,000 Units Oral Daily    vitamin renal formula (B-complex-vitamin c-folic acid)  1 capsule Oral Daily     Continuous Infusions:   sodium chloride 0.9%  1,000 mL Intravenous Continuous 125 mL/hr at 05/13/24 0956 1,000 mL at 05/13/24 0956     PRN Meds:.  Current Facility-Administered Medications:     acetaminophen, 650 mg, Oral, Q8H PRN    bisacodyL, 10 mg, Rectal, Daily PRN    oxyCODONE-acetaminophen, 1 tablet, Oral, Q4H PRN      Significant Labs: All pertinent labs within the past 24 hours have been reviewed.  Blood Culture: No results for input(s): "LABBLOO" in " "the last 48 hours.  CBC:   Recent Labs   Lab 05/12/24  1420 05/13/24  0425   WBC 7.04 6.78   HGB 12.1 11.0*   HCT 39.4 36.0*    241     CMP:   Recent Labs   Lab 05/12/24  1420 05/13/24  0425    141   K 4.6 4.5   CO2 26 23   BUN 57.0* 53.0*   CREATININE 1.80* 1.52*   CALCIUM 11.2* 10.1   ALBUMIN 3.7 3.2*   BILITOT 0.3 0.3   ALKPHOS 64 62   AST 12 9   ALT 16 14     Magnesium: No results for input(s): "MG" in the last 48 hours.  POCT Glucose: No results for input(s): "POCTGLUCOSE" in the last 48 hours.  Urine Culture: No results for input(s): "LABURIN" in the last 48 hours.  Urine Studies:   Recent Labs   Lab 05/13/24  0254   APPEARANCEUA Slightly Cloudy*   PROTEINUA 1+*   BILIRUBINUA Negative   UROBILINOGEN 0.2   LEUKOCYTESUR 3+*   RBCUA 11-20*   WBCUA 21-50*   BACTERIA Moderate*       Significant Imaging:   CT head  Impression:  1. No acute intracranial abnormality.  2. Stable prominence of the ventricular system which appears out of proportion to the degree of sulcal enlargement.  Similar findings can be seen in the setting of normal pressure hydrocephalus.  Stable area of encephalomalacia in the left temporoparietal region.  Generalized cerebral volume loss and chronic microvascular ischemic change.    CXR  Impression:  No consolidation or evidence of acute cardiac decompensation.    Assessment/Plan:   GADIEL (acute kidney injury)/CKD, stage IIIB  Patient with acute kidney injury/acute renal failure likely due to pre-renal azotemia due to dehydration   Routine medication list includes diuretics, ARB which likely exacerbated GADIEL; these were held at admission  Indices are trending down  Continue gentle hydration  Avoid nephrotoxic agents as possible    Acute metabolic encephalopathy  CT done in ER shows stable h/o enlarged ventrcles and chornic ischemic disease, old encephalomalacia  She is now awake, answers questions appropriately    Essential hypertension  Patient's blood pressures were initially running " low, now improved  Continue to hold medication    Diabetes mellitus  Continue to monitor blood sugars with Accu-Cheks and SSI  Add basal insulin as necessary    Obesity    Severe debility  She she has very little function/use of her upper extremities and is nonambulatory; she is nearly functionally quadriplegic    GI prophylaxis:  Famotidine  VTE prophylaxis:  Lovenox       Active Diagnoses:    Diagnosis Date Noted POA    PRINCIPAL PROBLEM:  GADIEL (acute kidney injury) [N17.9] 05/12/2024 Yes    Acute metabolic encephalopathy [G93.41] 05/12/2024 Yes    Hypotension [I95.9] 05/12/2024 Unknown    Obesity [E66.9] 09/25/2022 Yes    Diabetes mellitus [E11.9] 09/21/2022 Yes    Hypercholesterolemia [E78.00] 09/21/2022 Yes    Essential hypertension [I10] 09/21/2022 Yes    Stage 3b chronic kidney disease [N18.32] 09/21/2022 Yes      Problems Resolved During this Admission:     VTE Risk Mitigation (From admission, onward)           Ordered     enoxaparin injection 30 mg  Every 24 hours         05/13/24 1154     IP VTE HIGH RISK PATIENT  Once         05/12/24 1624     Place sequential compression device  Until discontinued         05/12/24 1624                Social determinants of health limiting diagnosis/treatment   none         Ernesto Mehta MD  Department of Hospital Medicine   [facility] - Medical Surgical Unit

## 2024-05-13 NOTE — PLAN OF CARE
Problem: Adult Inpatient Plan of Care  Goal: Plan of Care Review  Outcome: Progressing  Goal: Patient-Specific Goal (Individualized)  Outcome: Progressing  Goal: Absence of Hospital-Acquired Illness or Injury  Outcome: Progressing  Goal: Optimal Comfort and Wellbeing  Outcome: Progressing  Goal: Readiness for Transition of Care  Outcome: Progressing     Problem: Diabetes Comorbidity  Goal: Blood Glucose Level Within Targeted Range  Outcome: Progressing     Problem: Acute Kidney Injury/Impairment  Goal: Fluid and Electrolyte Balance  Outcome: Progressing  Goal: Improved Oral Intake  Outcome: Progressing  Goal: Effective Renal Function  Outcome: Progressing     Problem: Skin Injury Risk Increased  Goal: Skin Health and Integrity  Outcome: Progressing     Problem: Comorbidity Management  Goal: Blood Pressure in Desired Range  Outcome: Progressing     Problem: Fatigue  Goal: Improved Activity Tolerance  Outcome: Progressing     Problem: Fall Injury Risk  Goal: Absence of Fall and Fall-Related Injury  Outcome: Progressing     Problem: Fluid Volume Deficit  Goal: Fluid Balance  Outcome: Progressing

## 2024-05-14 VITALS
TEMPERATURE: 99 F | DIASTOLIC BLOOD PRESSURE: 59 MMHG | RESPIRATION RATE: 20 BRPM | WEIGHT: 203.25 LBS | SYSTOLIC BLOOD PRESSURE: 148 MMHG | HEART RATE: 65 BPM | HEIGHT: 62 IN | OXYGEN SATURATION: 96 % | BODY MASS INDEX: 37.4 KG/M2

## 2024-05-14 LAB
ANION GAP SERPL CALC-SCNC: 5 MEQ/L
BASOPHILS # BLD AUTO: 0.06 X10(3)/MCL
BASOPHILS NFR BLD AUTO: 1.2 %
BUN SERPL-MCNC: 32 MG/DL (ref 9.8–20.1)
CALCIUM SERPL-MCNC: 9.8 MG/DL (ref 8.4–10.2)
CHLORIDE SERPL-SCNC: 112 MMOL/L (ref 98–107)
CO2 SERPL-SCNC: 23 MMOL/L (ref 23–31)
CREAT SERPL-MCNC: 1.01 MG/DL (ref 0.55–1.02)
CREAT/UREA NIT SERPL: 32
EOSINOPHIL # BLD AUTO: 0.28 X10(3)/MCL (ref 0–0.9)
EOSINOPHIL NFR BLD AUTO: 5.5 %
ERYTHROCYTE [DISTWIDTH] IN BLOOD BY AUTOMATED COUNT: 14.6 % (ref 11.5–17)
GFR SERPLBLD CREATININE-BSD FMLA CKD-EPI: 60 ML/MIN/1.73/M2
GLUCOSE SERPL-MCNC: 88 MG/DL (ref 82–115)
HCT VFR BLD AUTO: 31.9 % (ref 37–47)
HGB BLD-MCNC: 10 G/DL (ref 12–16)
IMM GRANULOCYTES # BLD AUTO: 0.04 X10(3)/MCL (ref 0–0.04)
IMM GRANULOCYTES NFR BLD AUTO: 0.8 %
LYMPHOCYTES # BLD AUTO: 1.16 X10(3)/MCL (ref 0.6–4.6)
LYMPHOCYTES NFR BLD AUTO: 22.8 %
MAGNESIUM SERPL-MCNC: 2 MG/DL (ref 1.6–2.6)
MCH RBC QN AUTO: 27.6 PG (ref 27–31)
MCHC RBC AUTO-ENTMCNC: 31.3 G/DL (ref 33–36)
MCV RBC AUTO: 88.1 FL (ref 80–94)
MONOCYTES # BLD AUTO: 0.41 X10(3)/MCL (ref 0.1–1.3)
MONOCYTES NFR BLD AUTO: 8.1 %
NEUTROPHILS # BLD AUTO: 3.13 X10(3)/MCL (ref 2.1–9.2)
NEUTROPHILS NFR BLD AUTO: 61.6 %
PLATELET # BLD AUTO: 207 X10(3)/MCL (ref 130–400)
PMV BLD AUTO: 12.1 FL (ref 7.4–10.4)
POCT GLUCOSE: 103 MG/DL (ref 70–110)
POTASSIUM SERPL-SCNC: 3.9 MMOL/L (ref 3.5–5.1)
RBC # BLD AUTO: 3.62 X10(6)/MCL (ref 4.2–5.4)
SODIUM SERPL-SCNC: 140 MMOL/L (ref 136–145)
WBC # SPEC AUTO: 5.08 X10(3)/MCL (ref 4.5–11.5)

## 2024-05-14 PROCEDURE — 94761 N-INVAS EAR/PLS OXIMETRY MLT: CPT

## 2024-05-14 PROCEDURE — 83735 ASSAY OF MAGNESIUM: CPT | Performed by: EMERGENCY MEDICINE

## 2024-05-14 PROCEDURE — 85025 COMPLETE CBC W/AUTO DIFF WBC: CPT | Performed by: EMERGENCY MEDICINE

## 2024-05-14 PROCEDURE — 25000003 PHARM REV CODE 250: Performed by: FAMILY MEDICINE

## 2024-05-14 PROCEDURE — 36415 COLL VENOUS BLD VENIPUNCTURE: CPT | Performed by: EMERGENCY MEDICINE

## 2024-05-14 PROCEDURE — 25000003 PHARM REV CODE 250: Performed by: EMERGENCY MEDICINE

## 2024-05-14 PROCEDURE — 80048 BASIC METABOLIC PNL TOTAL CA: CPT | Performed by: EMERGENCY MEDICINE

## 2024-05-14 PROCEDURE — 63600175 PHARM REV CODE 636 W HCPCS: Performed by: EMERGENCY MEDICINE

## 2024-05-14 RX ORDER — CEFDINIR 300 MG/1
300 CAPSULE ORAL 2 TIMES DAILY
Start: 2024-05-14 | End: 2024-05-19

## 2024-05-14 RX ORDER — MUPIROCIN 20 MG/G
OINTMENT TOPICAL 2 TIMES DAILY
Status: DISCONTINUED | OUTPATIENT
Start: 2024-05-14 | End: 2024-05-14 | Stop reason: HOSPADM

## 2024-05-14 RX ORDER — SODIUM CHLORIDE 9 MG/ML
INJECTION, SOLUTION INTRAVENOUS CONTINUOUS
Status: DISCONTINUED | OUTPATIENT
Start: 2024-05-14 | End: 2024-05-14 | Stop reason: HOSPADM

## 2024-05-14 RX ADMIN — SENNOSIDES 1 TABLET: 8.6 TABLET, FILM COATED ORAL at 09:05

## 2024-05-14 RX ADMIN — SODIUM CHLORIDE: 9 INJECTION, SOLUTION INTRAVENOUS at 07:05

## 2024-05-14 RX ADMIN — DONEPEZIL HYDROCHLORIDE 10 MG: 5 TABLET, FILM COATED ORAL at 09:05

## 2024-05-14 RX ADMIN — MEMANTINE HYDROCHLORIDE 10 MG: 5 TABLET ORAL at 09:05

## 2024-05-14 RX ADMIN — MUPIROCIN 1 G: 20 OINTMENT TOPICAL at 09:05

## 2024-05-14 RX ADMIN — CHOLECALCIFEROL TAB 25 MCG (1000 UNIT) 2000 UNITS: 25 TAB at 09:05

## 2024-05-14 RX ADMIN — FAMOTIDINE 20 MG: 20 TABLET ORAL at 09:05

## 2024-05-14 RX ADMIN — ASCORBIC ACID, THIAMINE MONONITRATE,RIBOFLAVIN, NIACINAMIDE, PYRIDOXINE HYDROCHLORIDE, FOLIC ACID, CYANOCOBALAMIN, BIOTIN, CALCIUM PANTOTHENATE, 1 CAPSULE: 100; 1.5; 1.7; 20; 10; 1; 6000; 150000; 5 CAPSULE, LIQUID FILLED ORAL at 09:05

## 2024-05-14 RX ADMIN — CITALOPRAM HYDROBROMIDE 20 MG: 20 TABLET ORAL at 09:05

## 2024-05-14 RX ADMIN — POTASSIUM CHLORIDE 20 MEQ: 1500 TABLET, EXTENDED RELEASE ORAL at 09:05

## 2024-05-14 RX ADMIN — CEFTRIAXONE SODIUM 1 G: 1 INJECTION, POWDER, FOR SOLUTION INTRAMUSCULAR; INTRAVENOUS at 09:05

## 2024-05-14 RX ADMIN — LACTULOSE 10 G: 20 SOLUTION ORAL at 09:05

## 2024-05-14 RX ADMIN — Medication 500 MG: at 09:05

## 2024-05-14 RX ADMIN — POLYETHYLENE GLYCOL 3350 17 G: 17 POWDER, FOR SOLUTION ORAL at 09:05

## 2024-05-14 RX ADMIN — HYDROCORTISONE 5 MG: 5 TABLET ORAL at 09:05

## 2024-05-14 NOTE — PLAN OF CARE
Problem: Adult Inpatient Plan of Care  Goal: Plan of Care Review  Outcome: Progressing  Goal: Patient-Specific Goal (Individualized)  Outcome: Progressing  Goal: Absence of Hospital-Acquired Illness or Injury  Outcome: Progressing  Goal: Optimal Comfort and Wellbeing  Outcome: Progressing  Goal: Readiness for Transition of Care  Outcome: Progressing     Problem: Diabetes Comorbidity  Goal: Blood Glucose Level Within Targeted Range  Outcome: Progressing     Problem: Acute Kidney Injury/Impairment  Goal: Fluid and Electrolyte Balance  Outcome: Progressing  Goal: Improved Oral Intake  Outcome: Progressing  Goal: Effective Renal Function  Outcome: Progressing     Problem: Skin Injury Risk Increased  Goal: Skin Health and Integrity  Outcome: Progressing     Problem: Skin Injury Risk Increased  Goal: Skin Health and Integrity  Outcome: Progressing     Problem: Comorbidity Management  Goal: Blood Pressure in Desired Range  Outcome: Progressing     Problem: Fatigue  Goal: Improved Activity Tolerance  Outcome: Progressing     Problem: Fall Injury Risk  Goal: Absence of Fall and Fall-Related Injury  Outcome: Progressing

## 2024-05-14 NOTE — DISCHARGE SUMMARY
"[facility] - Medical Surgical Unit  Hospital Medicine  Discharge Summary      Patient Name: Thu Branch  MRN: [id number]  Admission Date: 5/12/2024  Hospital Length of Stay: 1 days  Discharge Date and Time:  05/14/2024 9:29 AM  Attending Physician: Ernesto Mehta MD   Discharging Provider: Ernesto Mehta MD  Discharge Provider Team: Networked reference to record PCT   Primary Care Provider: Joseph Vides MD        HPI:   Thu Branch is 73 y.o. female sent from the [facility] due to AMS.  She is follwed by Dr. Vides at the [facility].  She had some transient low BP reported by nursing home, most of the BP here have been normal, except a few on soft side.  She was worked up in ER and found to have GADIEL thought to be a bit dehydrated as the etiology for her hypotension and borderline hypotension.  She has started on some IVF in the ER.  Case was discussed with DR. Truong.  Pt has a past medical history of Coronary artery disease, Depression, Hypertension, and Stroke. In ER pt c/o Fatigue (Sent from [facility] for "hypotension". Reports pt has "not been acting her normal self" today.) She thinks she has been drinking fairly well and did not know she was dehydrated.  Denies any pain, does endorse some increased LE edema bilaterally.    * No surgery found *      Hospital Course:   5/13-when seen on rounds this morning was lying comfortably in bed; she was awake, able to respond to simple questions; she stated she did not feel good but could not specify what was wrong; she denied feeling short of breath, denied pain    5/14-she has continued to do well; she states she is ready to go home    ---    GADIEL (acute kidney injury)/CKD, stage IIIB  Patient with acute kidney injury/acute renal failure likely due to pre-renal azotemia due to dehydration   Routine medication list includes diuretics, ARB which likely exacerbated GADIEL; these were held at admission  Indices have " trended down  Will hold diuretic therapy, potassium supplement at discharge     Acute metabolic encephalopathy  CT done in ER showed stable h/o enlarged ventrcles and chornic ischemic disease, old encephalomalacia  Mental status has returned to baseline    Acute UTI  Culture growing GNR's, probably Proteus species  Received 2 doses of IV ceftriaxone while in the hospital  We will transition to oral cefdinir 300 mg b.i.d. for 5 days     Essential hypertension  Patient's blood pressures were initially running low, now improved  Resume routine medication, including ARB, at discharge     Diabetes mellitus  Resume routine medications     Obesity     Severe debility  She she has very little function/use of her upper extremities and is nonambulatory; she is nearly functionally quadriplegic    Physical exam  Constitution-obese, normally developed female in NAD  Eyes-PERRL, EOMI  HENT-normocephalic, atraumatic  Neck-supple  Respiratory-normal respirations  Heart-RRR  Abdomen-soft, nontender, nondistended  Genitourinary-deferred  Musculoskeletal-contractures in bilateral hands, bilateral pedal edema; disuse atrophy in lower extremities  Skin-warm, dry; no rashes  Neurologic-alert and oriented to person and place; minimal use of upper extremities; she is bed ridden    Consults:     Final Active Diagnoses:    Diagnosis Date Noted POA    PRINCIPAL PROBLEM:  GADIEL (acute kidney injury) [N17.9] 05/12/2024 Yes    Acute metabolic encephalopathy [G93.41] 05/12/2024 Yes    Hypotension [I95.9] 05/12/2024 Unknown    Obesity [E66.9] 09/25/2022 Yes    Diabetes mellitus [E11.9] 09/21/2022 Yes    Hypercholesterolemia [E78.00] 09/21/2022 Yes    Essential hypertension [I10] 09/21/2022 Yes    Stage 3b chronic kidney disease [N18.32] 09/21/2022 Yes      Problems Resolved During this Admission:      Discharged Condition: stable    Disposition: Nursing Facility    Follow Up:   Follow-up Information       Joseph Vides MD Follow up.     Specialty: Family Medicine  Why: Patient will be seen on nursing home rounds  Contact information:  [address]  [place] [address]  [phone]                           Patient Instructions:      Diet Cardiac     Activity as tolerated     Medications:  Reconciled Home Medications:      Medication List        START taking these medications      cefdinir 300 MG capsule  Commonly known as: OMNICEF  Take 1 capsule (300 mg total) by mouth 2 (two) times daily. for 5 days            CHANGE how you take these medications      pregabalin 100 MG capsule  Commonly known as: LYRICA  Take 100 mg by mouth 2 (two) times daily.  What changed: Another medication with the same name was removed. Continue taking this medication, and follow the directions you see here.            CONTINUE taking these medications      ALPRAZolam 0.5 MG tablet  Commonly known as: XANAX  Take 0.5 tablets (0.25 mg total) by mouth 3 (three) times daily as needed for Anxiety.     amLODIPine 5 MG tablet  Commonly known as: NORVASC  Take 1 tablet (5 mg total) by mouth once daily.     bisacodyL 10 mg Supp  Commonly known as: DULCOLAX  Place 1 suppository (10 mg total) rectally daily as needed.     CeleXA 20 MG tablet  Generic drug: citalopram  30 mg once daily.     donepeziL 10 MG tablet  Commonly known as: ARICEPT  Take 10 mg by mouth once daily.     hydrocortisone 5 MG Tab  Commonly known as: CORTEF  Take 5 mg by mouth once daily.     lactulose 10 gram/15 mL solution  Commonly known as: CHRONULAC  45 mL every day Orally Once a day     LINZESS 145 mcg Cap capsule  Generic drug: linaCLOtide  Take 290 mcg by mouth.     losartan 100 MG tablet  Commonly known as: COZAAR  Take 1 tablet (100 mg total) by mouth once daily.     melatonin 5 mg Cap  Take 1 mg by mouth nightly.     memantine 10 MG Tab  Commonly known as: NAMENDA  Take 1 tablet (10 mg total) by mouth 2 (two) times daily.     methylphenidate HCl 5 MG tablet  Commonly known as: RITALIN  Take 1  tablet (5 mg total) by mouth once daily.     oxyCODONE-acetaminophen 5-325 mg per tablet  Commonly known as: PERCOCET  1 tablet Orally twice a day for back pain - Dx G89.4 Qty Medically Necessary Greater 7 Days for 30 days     senna 8.6 mg tablet  Commonly known as: SENOKOT  Take 1 tablet by mouth once daily.     traZODone 100 MG tablet  Commonly known as: DESYREL  Take 200 mg by mouth every evening.     TRULICITY 1.5 mg/0.5 mL pen injector  Generic drug: dulaglutide  Inject 1.5 mg into the skin every 7 days.     vitamin D 1000 units Tab  Commonly known as: VITAMIN D3  Take 2,000 Units by mouth once daily.     vitamin renal formula (B-complex-vitamin c-folic acid) 1 mg Cap  Commonly known as: NEPHROCAP  Take 1 capsule by mouth once daily.            STOP taking these medications      bumetanide 1 MG tablet  Commonly known as: BUMEX     clotrimazole-betamethasone 1-0.05% cream  Commonly known as: LOTRISONE     diphenhydrAMINE 25 mg capsule  Commonly known as: BENADRYL     furosemide 20 MG tablet  Commonly known as: LASIX     furosemide 40 MG tablet  Commonly known as: LASIX     polyethylene glycol 17 gram Pwpk  Commonly known as: GLYCOLAX     potassium chloride SA 20 MEQ tablet  Commonly known as: K-MENDOZA CHAVES-CON     predniSONE 10 MG tablet  Commonly known as: DELTASONE            ASK your doctor about these medications      VITAMIN C 500 MG tablet  Generic drug: ascorbic acid (vitamin C)  Take 500 mg by mouth once daily.              Significant Diagnostic Studies: Labs: CMP   Recent Labs   Lab 05/12/24  1420 05/13/24  0425 05/14/24  0458    141 140   K 4.6 4.5 3.9   CO2 26 23 23   BUN 57.0* 53.0* 32.0*   CREATININE 1.80* 1.52* 1.01   CALCIUM 11.2* 10.1 9.8   ALBUMIN 3.7 3.2*  --    BILITOT 0.3 0.3  --    ALKPHOS 64 62  --    AST 12 9  --    ALT 16 14  --     and CBC   Recent Labs   Lab 05/12/24  1420 05/13/24  0425 05/14/24  0458   WBC 7.04 6.78 5.08   HGB 12.1 11.0* 10.0*   HCT 39.4 36.0* 31.9*    241  207     Radiology:   CT head  Impression:  1. No acute intracranial abnormality.  2. Stable prominence of the ventricular system which appears out of proportion to the degree of sulcal enlargement.  Similar findings can be seen in the setting of normal pressure hydrocephalus.  Stable area of encephalomalacia in the left temporoparietal region.  Generalized cerebral volume loss and chronic microvascular ischemic change.     CXR  Impression:  No consolidation or evidence of acute cardiac decompensation.    Pending Diagnostic Studies:       None          Indwelling Lines/Drains at time of discharge:   Lines/Drains/Airways       None                   Time spent on the discharge of patient: 42 minutes         Ernesto Mehta MD  Department of Hospital Medicine  [facility]

## 2024-05-14 NOTE — NURSING
Report given to Mrs. Zimmerman at [facility] at 12:13 pm. Notified Mrs. Zimmerman that I will be contacting [facility] ambulance to transfer patient back to nursing home.

## 2024-05-14 NOTE — PLAN OF CARE
Placed pt in will-call with TAMYI to return to [facility].  Nurse will give report to Elsie.

## 2024-05-14 NOTE — PLAN OF CARE
Problem: Adult Inpatient Plan of Care  Goal: Plan of Care Review  Outcome: Progressing  Goal: Patient-Specific Goal (Individualized)  Outcome: Progressing  Goal: Absence of Hospital-Acquired Illness or Injury  Outcome: Progressing  Goal: Optimal Comfort and Wellbeing  Outcome: Progressing  Goal: Readiness for Transition of Care  Outcome: Progressing     Problem: Diabetes Comorbidity  Goal: Blood Glucose Level Within Targeted Range  Outcome: Progressing     Problem: Acute Kidney Injury/Impairment  Goal: Fluid and Electrolyte Balance  Outcome: Progressing  Goal: Improved Oral Intake  Outcome: Progressing  Goal: Effective Renal Function  Outcome: Progressing     Problem: Skin Injury Risk Increased  Goal: Skin Health and Integrity  Outcome: Progressing     Problem: Comorbidity Management  Goal: Blood Pressure in Desired Range  Outcome: Progressing     Problem: Fatigue  Goal: Improved Activity Tolerance  Outcome: Progressing     Problem: Fall Injury Risk  Goal: Absence of Fall and Fall-Related Injury  Outcome: Progressing     Problem: Fluid Volume Deficit  Goal: Fluid Balance  Outcome: Progressing     Problem: Chronic Kidney Disease  Goal: Optimal Coping with Chronic Illness  Outcome: Progressing  Goal: Electrolyte Balance  Outcome: Progressing  Goal: Fluid Balance  Outcome: Progressing  Goal: Optimal Functional Ability  Outcome: Progressing  Goal: Absence of Anemia Signs and Symptoms  Outcome: Progressing  Goal: Optimal Oral Intake  Outcome: Progressing  Goal: Acceptable Pain Control  Outcome: Progressing  Goal: Minimize Renal Failure Effects  Outcome: Progressing     Problem: Hypertension Acute  Goal: Blood Pressure Within Desired Range  Outcome: Progressing     Problem: Stroke, Ischemic (Includes Transient Ischemic Attack)  Goal: Optimal Coping  Outcome: Progressing  Goal: Effective Bowel Elimination  Outcome: Progressing  Goal: Optimal Cerebral Tissue Perfusion  Outcome: Progressing  Goal: Optimal Cognitive  Function  Outcome: Progressing  Goal: Improved Communication Skills  Outcome: Progressing  Goal: Optimal Functional Ability  Outcome: Progressing  Goal: Optimal Nutrition Intake  Outcome: Progressing  Goal: Effective Oxygenation and Ventilation  Outcome: Progressing  Goal: Improved Sensorimotor Function  Outcome: Progressing  Goal: Safe and Effective Swallow  Outcome: Progressing  Goal: Effective Urinary Elimination  Outcome: Progressing

## 2024-05-14 NOTE — PLAN OF CARE
05/14/24 1011   Final Note   Assessment Type Final Discharge Note   Anticipated Discharge Disposition [facility]   Post-Acute Status   Discharge Delays None known at this time     [facility]  Messaged Monique to see who we call report to.

## 2024-05-15 LAB — BACTERIA UR CULT: ABNORMAL

## 2024-05-18 LAB
BACTERIA BLD CULT: NORMAL
BACTERIA BLD CULT: NORMAL

## 2024-05-21 ENCOUNTER — PATIENT OUTREACH (OUTPATIENT)
Dept: ADMINISTRATIVE | Facility: CLINIC | Age: 74
End: 2024-05-21
Payer: MEDICARE

## 2024-08-02 ENCOUNTER — HOSPITAL ENCOUNTER (INPATIENT)
Facility: HOSPITAL | Age: 74
LOS: 7 days | Discharge: HOME | DRG: 871 | End: 2024-08-09
Attending: EMERGENCY MEDICINE | Admitting: EMERGENCY MEDICINE
Payer: MEDICARE

## 2024-08-02 DIAGNOSIS — R07.9 CHEST PAIN: ICD-10-CM

## 2024-08-02 DIAGNOSIS — J69.0 ASPIRATION PNEUMONIA OF RIGHT LUNG, UNSPECIFIED ASPIRATION PNEUMONIA TYPE, UNSPECIFIED PART OF LUNG: ICD-10-CM

## 2024-08-02 DIAGNOSIS — R00.0 TACHYCARDIA: ICD-10-CM

## 2024-08-02 DIAGNOSIS — R41.82 ALTERED MENTAL STATUS: ICD-10-CM

## 2024-08-02 DIAGNOSIS — N17.9 ACUTE KIDNEY INJURY: ICD-10-CM

## 2024-08-02 DIAGNOSIS — I48.91 A-FIB: ICD-10-CM

## 2024-08-02 DIAGNOSIS — K57.30 DIVERTICULOSIS OF COLON: Primary | ICD-10-CM

## 2024-08-02 LAB
ADV 40+41 DNA STL QL NAA+NON-PROBE: NOT DETECTED
ALBUMIN SERPL-MCNC: 2.7 G/DL (ref 3.4–4.8)
ALBUMIN/GLOB SERPL: 0.8 RATIO (ref 1.1–2)
ALLENS TEST: ABNORMAL
ALP SERPL-CCNC: 57 UNIT/L (ref 40–150)
ALT SERPL-CCNC: 21 UNIT/L (ref 0–55)
ANION GAP SERPL CALC-SCNC: 12 MEQ/L
AST SERPL-CCNC: 20 UNIT/L (ref 5–34)
ASTRO TYP 1-8 RNA STL QL NAA+NON-PROBE: NOT DETECTED
BACTERIA #/AREA URNS AUTO: ABNORMAL /HPF
BASOPHILS # BLD AUTO: 0.04 X10(3)/MCL
BASOPHILS NFR BLD AUTO: 0.2 %
BILIRUB SERPL-MCNC: 0.4 MG/DL
BILIRUB UR QL STRIP.AUTO: ABNORMAL
BUN SERPL-MCNC: 43 MG/DL (ref 9.8–20.1)
C CAYETANENSIS DNA STL QL NAA+NON-PROBE: NOT DETECTED
C COLI+JEJ+UPSA DNA STL QL NAA+NON-PROBE: NOT DETECTED
C DIFF TOX A+B STL QL IA: NEGATIVE
CALCIUM SERPL-MCNC: 10.3 MG/DL (ref 8.4–10.2)
CHLORIDE SERPL-SCNC: 107 MMOL/L (ref 98–107)
CLARITY UR: ABNORMAL
CLOSTRIDIUM DIFFICILE GDH ANTIGEN (OHS): NEGATIVE
CO2 SERPL-SCNC: 22 MMOL/L (ref 23–31)
COLOR UR AUTO: ABNORMAL
CREAT SERPL-MCNC: 2.24 MG/DL (ref 0.55–1.02)
CREAT/UREA NIT SERPL: 19
CRYPTOSP DNA STL QL NAA+NON-PROBE: NOT DETECTED
DELSYS: ABNORMAL
E HISTOLYT DNA STL QL NAA+NON-PROBE: NOT DETECTED
EAEC PAA PLAS AGGR+AATA ST NAA+NON-PRB: NOT DETECTED
EC STX1+STX2 GENES STL QL NAA+NON-PROBE: NOT DETECTED
EOSINOPHIL # BLD AUTO: 0.01 X10(3)/MCL (ref 0–0.9)
EOSINOPHIL NFR BLD AUTO: 0.1 %
EPEC EAE GENE STL QL NAA+NON-PROBE: NOT DETECTED
ERYTHROCYTE [DISTWIDTH] IN BLOOD BY AUTOMATED COUNT: 15 % (ref 11.5–17)
ETEC LTA+ST1A+ST1B TOX ST NAA+NON-PROBE: NOT DETECTED
FLUAV AG UPPER RESP QL IA.RAPID: NOT DETECTED
FLUBV AG UPPER RESP QL IA.RAPID: NOT DETECTED
G LAMBLIA DNA STL QL NAA+NON-PROBE: NOT DETECTED
GFR SERPLBLD CREATININE-BSD FMLA CKD-EPI: 23 ML/MIN/1.73/M2
GLOBULIN SER-MCNC: 3.6 GM/DL (ref 2.4–3.5)
GLUCOSE SERPL-MCNC: 158 MG/DL (ref 82–115)
GLUCOSE UR QL STRIP: NEGATIVE
HCO3 UR-SCNC: 23.8 MMOL/L (ref 24–28)
HCT VFR BLD AUTO: 29.7 % (ref 37–47)
HGB BLD-MCNC: 9.1 G/DL (ref 12–16)
HGB UR QL STRIP: ABNORMAL
IMM GRANULOCYTES # BLD AUTO: 0.16 X10(3)/MCL (ref 0–0.04)
IMM GRANULOCYTES NFR BLD AUTO: 0.9 %
KETONES UR QL STRIP: NEGATIVE
LACTATE SERPL-SCNC: 1.3 MMOL/L (ref 0.5–2.2)
LEUKOCYTE ESTERASE UR QL STRIP: ABNORMAL
LYMPHOCYTES # BLD AUTO: 0.66 X10(3)/MCL (ref 0.6–4.6)
LYMPHOCYTES NFR BLD AUTO: 3.8 %
MCH RBC QN AUTO: 25.3 PG (ref 27–31)
MCHC RBC AUTO-ENTMCNC: 30.6 G/DL (ref 33–36)
MCV RBC AUTO: 82.7 FL (ref 80–94)
MONOCYTES # BLD AUTO: 1.29 X10(3)/MCL (ref 0.1–1.3)
MONOCYTES NFR BLD AUTO: 7.4 %
NEUTROPHILS # BLD AUTO: 15.16 X10(3)/MCL (ref 2.1–9.2)
NEUTROPHILS NFR BLD AUTO: 87.6 %
NITRITE UR QL STRIP: NEGATIVE
NOROVIRUS GI+II RNA STL QL NAA+NON-PROBE: NOT DETECTED
OHS QRS DURATION: 74 MS
OHS QTC CALCULATION: 474 MS
P SHIGELLOIDES DNA STL QL NAA+NON-PROBE: NOT DETECTED
PCO2 BLDA: 45.9 MMHG (ref 35–45)
PH SMN: 7.32 [PH] (ref 7.35–7.45)
PH UR STRIP: 6 [PH]
PLATELET # BLD AUTO: 261 X10(3)/MCL (ref 130–400)
PMV BLD AUTO: 11.9 FL (ref 7.4–10.4)
PO2 BLDA: 56 MMHG (ref 80–100)
POC BE: -2 MMOL/L
POC SATURATED O2: 86 % (ref 95–100)
POC TCO2: 25 MMOL/L (ref 23–27)
POCT GLUCOSE: 190 MG/DL (ref 70–110)
POCT GLUCOSE: 215 MG/DL (ref 70–110)
POTASSIUM SERPL-SCNC: 4.3 MMOL/L (ref 3.5–5.1)
PROT SERPL-MCNC: 6.3 GM/DL (ref 5.8–7.6)
PROT UR QL STRIP: ABNORMAL
RBC # BLD AUTO: 3.59 X10(6)/MCL (ref 4.2–5.4)
RBC #/AREA URNS AUTO: ABNORMAL /HPF
RVA RNA STL QL NAA+NON-PROBE: NOT DETECTED
S ENT+BONG DNA STL QL NAA+NON-PROBE: NOT DETECTED
SAMPLE: ABNORMAL
SAPO I+II+IV+V RNA STL QL NAA+NON-PROBE: NOT DETECTED
SARS-COV-2 RNA RESP QL NAA+PROBE: NOT DETECTED
SHIGELLA SP+EIEC IPAH ST NAA+NON-PROBE: NOT DETECTED
SITE: ABNORMAL
SODIUM SERPL-SCNC: 141 MMOL/L (ref 136–145)
SP GR UR STRIP.AUTO: >=1.03 (ref 1–1.03)
SQUAMOUS #/AREA URNS AUTO: ABNORMAL /HPF
TROPONIN I SERPL-MCNC: 0.03 NG/ML (ref 0–0.04)
UROBILINOGEN UR STRIP-ACNC: 0.2
V CHOL+PARA+VUL DNA STL QL NAA+NON-PROBE: NOT DETECTED
V CHOLERAE DNA STL QL NAA+NON-PROBE: NOT DETECTED
WBC # BLD AUTO: 17.32 X10(3)/MCL (ref 4.5–11.5)
WBC #/AREA URNS AUTO: >100 /HPF
Y ENTEROCOL DNA STL QL NAA+NON-PROBE: NOT DETECTED

## 2024-08-02 PROCEDURE — 0240U COVID/FLU A&B PCR: CPT | Performed by: EMERGENCY MEDICINE

## 2024-08-02 PROCEDURE — 87077 CULTURE AEROBIC IDENTIFY: CPT | Performed by: EMERGENCY MEDICINE

## 2024-08-02 PROCEDURE — 94761 N-INVAS EAR/PLS OXIMETRY MLT: CPT | Mod: XB

## 2024-08-02 PROCEDURE — 82803 BLOOD GASES ANY COMBINATION: CPT

## 2024-08-02 PROCEDURE — 63600175 PHARM REV CODE 636 W HCPCS: Performed by: EMERGENCY MEDICINE

## 2024-08-02 PROCEDURE — 86318 IA INFECTIOUS AGENT ANTIBODY: CPT | Performed by: EMERGENCY MEDICINE

## 2024-08-02 PROCEDURE — 84484 ASSAY OF TROPONIN QUANT: CPT | Performed by: EMERGENCY MEDICINE

## 2024-08-02 PROCEDURE — 36600 WITHDRAWAL OF ARTERIAL BLOOD: CPT

## 2024-08-02 PROCEDURE — 83605 ASSAY OF LACTIC ACID: CPT | Performed by: EMERGENCY MEDICINE

## 2024-08-02 PROCEDURE — 87186 SC STD MICRODIL/AGAR DIL: CPT | Performed by: EMERGENCY MEDICINE

## 2024-08-02 PROCEDURE — 94640 AIRWAY INHALATION TREATMENT: CPT

## 2024-08-02 PROCEDURE — 99285 EMERGENCY DEPT VISIT HI MDM: CPT | Mod: 25

## 2024-08-02 PROCEDURE — 27000221 HC OXYGEN, UP TO 24 HOURS

## 2024-08-02 PROCEDURE — 25000003 PHARM REV CODE 250: Performed by: EMERGENCY MEDICINE

## 2024-08-02 PROCEDURE — 81003 URINALYSIS AUTO W/O SCOPE: CPT | Performed by: EMERGENCY MEDICINE

## 2024-08-02 PROCEDURE — 93010 ELECTROCARDIOGRAM REPORT: CPT | Mod: ,,, | Performed by: INTERNAL MEDICINE

## 2024-08-02 PROCEDURE — 27000207 HC ISOLATION

## 2024-08-02 PROCEDURE — 99900035 HC TECH TIME PER 15 MIN (STAT)

## 2024-08-02 PROCEDURE — 87154 CUL TYP ID BLD PTHGN 6+ TRGT: CPT | Performed by: EMERGENCY MEDICINE

## 2024-08-02 PROCEDURE — 11000001 HC ACUTE MED/SURG PRIVATE ROOM

## 2024-08-02 PROCEDURE — 25000242 PHARM REV CODE 250 ALT 637 W/ HCPCS: Performed by: EMERGENCY MEDICINE

## 2024-08-02 PROCEDURE — 80053 COMPREHEN METABOLIC PANEL: CPT | Performed by: EMERGENCY MEDICINE

## 2024-08-02 PROCEDURE — 87086 URINE CULTURE/COLONY COUNT: CPT | Performed by: EMERGENCY MEDICINE

## 2024-08-02 PROCEDURE — 87040 BLOOD CULTURE FOR BACTERIA: CPT | Performed by: EMERGENCY MEDICINE

## 2024-08-02 PROCEDURE — 85025 COMPLETE CBC W/AUTO DIFF WBC: CPT | Performed by: EMERGENCY MEDICINE

## 2024-08-02 PROCEDURE — 93005 ELECTROCARDIOGRAM TRACING: CPT

## 2024-08-02 PROCEDURE — 21400001 HC TELEMETRY ROOM

## 2024-08-02 PROCEDURE — 96365 THER/PROPH/DIAG IV INF INIT: CPT

## 2024-08-02 PROCEDURE — 87507 IADNA-DNA/RNA PROBE TQ 12-25: CPT | Performed by: EMERGENCY MEDICINE

## 2024-08-02 RX ORDER — SODIUM CHLORIDE 0.9 % (FLUSH) 0.9 %
10 SYRINGE (ML) INJECTION EVERY 12 HOURS PRN
Status: DISCONTINUED | OUTPATIENT
Start: 2024-08-02 | End: 2024-08-09 | Stop reason: HOSPADM

## 2024-08-02 RX ORDER — SODIUM CHLORIDE 9 MG/ML
INJECTION, SOLUTION INTRAVENOUS CONTINUOUS
Status: DISCONTINUED | OUTPATIENT
Start: 2024-08-02 | End: 2024-08-03

## 2024-08-02 RX ORDER — IPRATROPIUM BROMIDE AND ALBUTEROL SULFATE 2.5; .5 MG/3ML; MG/3ML
3 SOLUTION RESPIRATORY (INHALATION) EVERY 6 HOURS
Status: COMPLETED | OUTPATIENT
Start: 2024-08-02 | End: 2024-08-05

## 2024-08-02 RX ORDER — NALOXONE HCL 0.4 MG/ML
0.02 VIAL (ML) INJECTION
Status: DISCONTINUED | OUTPATIENT
Start: 2024-08-02 | End: 2024-08-09 | Stop reason: HOSPADM

## 2024-08-02 RX ORDER — FAMOTIDINE 20 MG/1
20 TABLET, FILM COATED ORAL DAILY
Status: DISCONTINUED | OUTPATIENT
Start: 2024-08-02 | End: 2024-08-09 | Stop reason: HOSPADM

## 2024-08-02 RX ORDER — MEMANTINE HYDROCHLORIDE 5 MG/1
10 TABLET ORAL 2 TIMES DAILY
Status: DISCONTINUED | OUTPATIENT
Start: 2024-08-02 | End: 2024-08-09 | Stop reason: HOSPADM

## 2024-08-02 RX ORDER — HEPARIN SODIUM 5000 [USP'U]/ML
5000 INJECTION, SOLUTION INTRAVENOUS; SUBCUTANEOUS EVERY 8 HOURS
Status: DISCONTINUED | OUTPATIENT
Start: 2024-08-02 | End: 2024-08-02

## 2024-08-02 RX ORDER — ENOXAPARIN SODIUM 100 MG/ML
30 INJECTION SUBCUTANEOUS EVERY 24 HOURS
Status: DISCONTINUED | OUTPATIENT
Start: 2024-08-02 | End: 2024-08-04

## 2024-08-02 RX ORDER — GLUCAGON 1 MG
1 KIT INJECTION
Status: DISCONTINUED | OUTPATIENT
Start: 2024-08-02 | End: 2024-08-09 | Stop reason: HOSPADM

## 2024-08-02 RX ORDER — ACETAMINOPHEN 325 MG/1
650 TABLET ORAL EVERY 8 HOURS PRN
Status: DISCONTINUED | OUTPATIENT
Start: 2024-08-02 | End: 2024-08-09 | Stop reason: HOSPADM

## 2024-08-02 RX ORDER — ACETAMINOPHEN 325 MG/1
650 TABLET ORAL EVERY 4 HOURS PRN
Status: DISCONTINUED | OUTPATIENT
Start: 2024-08-02 | End: 2024-08-09 | Stop reason: HOSPADM

## 2024-08-02 RX ORDER — IBUPROFEN 200 MG
16 TABLET ORAL
Status: DISCONTINUED | OUTPATIENT
Start: 2024-08-02 | End: 2024-08-09 | Stop reason: HOSPADM

## 2024-08-02 RX ORDER — IBUPROFEN 200 MG
24 TABLET ORAL
Status: DISCONTINUED | OUTPATIENT
Start: 2024-08-02 | End: 2024-08-09 | Stop reason: HOSPADM

## 2024-08-02 RX ORDER — MUPIROCIN 20 MG/G
OINTMENT TOPICAL 2 TIMES DAILY
Status: COMPLETED | OUTPATIENT
Start: 2024-08-02 | End: 2024-08-07

## 2024-08-02 RX ORDER — HYDROCORTISONE 5 MG/1
5 TABLET ORAL DAILY
Status: DISCONTINUED | OUTPATIENT
Start: 2024-08-02 | End: 2024-08-02

## 2024-08-02 RX ORDER — LINACLOTIDE 290 UG/1
290 CAPSULE, GELATIN COATED ORAL DAILY
COMMUNITY
Start: 2024-07-18

## 2024-08-02 RX ORDER — DONEPEZIL HYDROCHLORIDE 5 MG/1
10 TABLET, FILM COATED ORAL 2 TIMES DAILY
Status: DISCONTINUED | OUTPATIENT
Start: 2024-08-02 | End: 2024-08-09 | Stop reason: HOSPADM

## 2024-08-02 RX ORDER — INSULIN ASPART 100 [IU]/ML
0-10 INJECTION, SOLUTION INTRAVENOUS; SUBCUTANEOUS
Status: DISCONTINUED | OUTPATIENT
Start: 2024-08-02 | End: 2024-08-09 | Stop reason: HOSPADM

## 2024-08-02 RX ADMIN — HYDROCORTISONE SODIUM SUCCINATE 100 MG: 100 INJECTION, POWDER, FOR SOLUTION INTRAMUSCULAR; INTRAVENOUS at 06:08

## 2024-08-02 RX ADMIN — SODIUM CHLORIDE 1000 ML: 9 INJECTION, SOLUTION INTRAVENOUS at 11:08

## 2024-08-02 RX ADMIN — HYDROCORTISONE SODIUM SUCCINATE 100 MG: 100 INJECTION, POWDER, FOR SOLUTION INTRAMUSCULAR; INTRAVENOUS at 11:08

## 2024-08-02 RX ADMIN — MUPIROCIN 1 G: 20 OINTMENT TOPICAL at 08:08

## 2024-08-02 RX ADMIN — SODIUM CHLORIDE: 9 INJECTION, SOLUTION INTRAVENOUS at 03:08

## 2024-08-02 RX ADMIN — PIPERACILLIN AND TAZOBACTAM 4.5 G: 4; .5 INJECTION, POWDER, LYOPHILIZED, FOR SOLUTION INTRAVENOUS; PARENTERAL at 04:08

## 2024-08-02 RX ADMIN — ENOXAPARIN SODIUM 30 MG: 30 INJECTION SUBCUTANEOUS at 05:08

## 2024-08-02 RX ADMIN — IPRATROPIUM BROMIDE AND ALBUTEROL SULFATE 3 ML: .5; 3 SOLUTION RESPIRATORY (INHALATION) at 08:08

## 2024-08-02 RX ADMIN — PIPERACILLIN AND TAZOBACTAM 4.5 G: 4; .5 INJECTION, POWDER, LYOPHILIZED, FOR SOLUTION INTRAVENOUS; PARENTERAL at 11:08

## 2024-08-02 NOTE — ED PROVIDER NOTES
Encounter Date: 8/2/2024       History     Chief Complaint   Patient presents with    Altered Mental Status     Sent from [facility] for low O2 sat of 88% RA, AMS and lethargic     The patient was sent by ambulance from the nursing home with a chief complaint of decreased mentation and low oxygen levels.  The patient states that she does not feel well but voices no specific complaints.  She denies vomiting or diarrhea.  She denies cough.  She is bed-bound.  She has history of coronary artery disease, hypertension, stroke, and CABG.        Review of patient's allergies indicates:   Allergen Reactions    Adhesive tape-silicones Blisters    Chlorpheniramine-pseudoephed Other (See Comments)    Codeine Itching    Morphine Itching     Other reaction(s): Unknown    Statins-hmg-coa reductase inhibitors Nausea Only and Rash     Other reaction(s): muscle weakness     Past Medical History:   Diagnosis Date    Coronary artery disease     Depression     Hypertension     Stroke      Past Surgical History:   Procedure Laterality Date    CORONARY ARTERY BYPASS GRAFT       No family history on file.  Social History     Tobacco Use    Smoking status: Never    Smokeless tobacco: Never   Substance Use Topics    Alcohol use: Not Currently    Drug use: Never     Review of Systems   Reason unable to perform ROS: Altered mental status.       Physical Exam     Initial Vitals [08/02/24 1014]   BP Pulse Resp Temp SpO2   (!) 106/54 66 16 98.1 °F (36.7 °C) (!) 94 %      MAP       --         Physical Exam    Constitutional:   Vital signs reviewed.  Nursing note reviewed.    General:  The patient is sleeping and lethargic.  She does wake up and answers questions appropriately but then falls back asleep immediate.  Eyes: Conjunctiva are clear.  Corneas appear normal.  EOMI.  ENT: Face, head, external nose, and ears are normal-appearing.  The oropharynx appears normal.  The uvula is midline.  The posterior pharyngeal elements are symmetric.    Mucous membranes moist.  Respiratory: The respiratory effort is normal.  The lungs are clear to auscultation, though the patient is taking shallow respirations.  Cardiovascular: Heart sounds are normal.  No murmur or rub.  The rate is normal.  The rhythm is normal.  Peripheral pulses are normal and equal bilaterally.  No edema is present.  Capillary refill is less than 3 seconds.  GI: The abdomen is soft, nondistended, without mass.  There is no tenderness to palpation.  No rebound or guarding.  Bowel sounds are normal.  The liver and spleen are nonpalpable.  Exam limited by habitus.  Musculoskeletal: Range of motion of all joints appears normal without pain or tenderness.  Skin: There is no rash.  Neurologic:  She answers questions appropriately but is quite lethargic and sleepy.           ED Course   Procedures  Labs Reviewed   COMPREHENSIVE METABOLIC PANEL - Abnormal       Result Value    Sodium 141      Potassium 4.3      Chloride 107      CO2 22 (*)     Glucose 158 (*)     Blood Urea Nitrogen 43.0 (*)     Creatinine 2.24 (*)     Calcium 10.3 (*)     Protein Total 6.3      Albumin 2.7 (*)     Globulin 3.6 (*)     Albumin/Globulin Ratio 0.8 (*)     Bilirubin Total 0.4      ALP 57      ALT 21      AST 20      eGFR 23      Anion Gap 12.0      BUN/Creatinine Ratio 19     CBC WITH DIFFERENTIAL - Abnormal    WBC 17.32 (*)     RBC 3.59 (*)     Hgb 9.1 (*)     Hct 29.7 (*)     MCV 82.7      MCH 25.3 (*)     MCHC 30.6 (*)     RDW 15.0      Platelet 261      MPV 11.9 (*)     Neut % 87.6      Lymph % 3.8      Mono % 7.4      Eos % 0.1      Basophil % 0.2      Lymph # 0.66      Neut # 15.16 (*)     Mono # 1.29      Eos # 0.01      Baso # 0.04      IG# 0.16 (*)     IG% 0.9     COVID/FLU A&B PCR - Normal    Influenza A PCR Not Detected      Influenza B PCR Not Detected      SARS-CoV-2 PCR Not Detected      Narrative:     The Xpert Xpress SARS-CoV-2/FLU/RSV plus is a rapid, multiplexed real-time PCR test intended for the  simultaneous qualitative detection and differentiation of SARS-CoV-2, Influenza A, Influenza B, and respiratory syncytial virus (RSV) viral RNA in either nasopharyngeal swab or nasal swab specimens.         LACTIC ACID, PLASMA - Normal    Lactic Acid Level 1.3     TROPONIN I - Normal    Troponin-I 0.031     BLOOD CULTURE [facility]   CBC W/ AUTO DIFFERENTIAL    Narrative:     The following orders were created for panel order CBC auto differential.  Procedure                               Abnormality         Status                     ---------                               -----------         ------                     CBC with Differential[[id number]]       Abnormal            Final result                 Please view results for these tests on the individual orders.   URINALYSIS, REFLEX TO URINE CULTURE     EKG Readings: (Independently Interpreted)   Normal sinus rhythm with a rate of 63 beats per minute.  Normal axis.  Normal intervals.  No ST segment changes.  Nonspecific T-wave changes are present.     ECG Results              EKG 12-lead (In process)        Collection Time Result Time QRS Duration [facility] QTC Calculation    08/02/24 10:40:59 08/02/24 12:34:17 74 474                     In process by Interface, Lab In [facility] (08/02/24 12:34:25)                   Narrative:    Test Reason : R41.82,    Vent. Rate : 063 BPM     Atrial Rate : 063 BPM     P-R Int : 174 ms          QRS Dur : 074 ms      QT Int : 464 ms       P-R-T Axes : 070 037 084 degrees     QTc Int : 474 ms    Normal sinus rhythm  Nonspecific ST and T wave abnormality  Abnormal ECG  When compared with ECG of 12-MAY-2024 14:02,  Nonspecific T wave abnormality no longer evident in Inferior leads  Nonspecific T wave abnormality now evident in Lateral leads    Referred By: PROVIDER NOTINSYSTEM           Confirmed By:                                   Imaging Results              CT Head Without Contrast (Final result)  Result time 08/02/24 12:24:39      Final  result by Adonay Nieves MD (08/02/24 12:24:39)                   Impression:      1. Extensive beam hardening artifact due to a prominent metallic: The region of the [id number]-fc-[id number]  2. Generalized cerebral and cerebellar atrophy  3. Suspect parenchymal defect again evident at the left temple parietal lobe compatible with old infarct/encephalomalacia  4. Intracranial atherosclerosis  5. Findings and other details as above      Electronically signed by: Adonay Nieves  Date:    08/02/2024  Time:    12:24               Narrative:    EXAMINATION:  CT HEAD WITHOUT CONTRAST    CLINICAL HISTORY:  AMS;, .    TECHNIQUE:  PATIENT RADIATION DOSE: DLP(mGycm) 871    As per PQRS measures, all CT scans at this facility used dose modulation, iterative reconstruction, and/or weight based dose adjustment when appropriate to reduce radiation dose to as low as reasonably achievable.    COMPARISON:  05/12/2024    FINDINGS:  Serial axial images were obtained of the head without the administration of IV contrast. Both brain and bone parenchymal windows were obtained.  Additional coronal and sagittal reconstructions were obtained.  Ventricles, cisterns, and sulci are prominent in size.  There is extensive beam hardening artifact due to a prominent metallic: The region of the sfnkyv-yb-Uwthyz.  There is no obvious evidence of intracranial hemorrhage, midline shift, mass effect, or abnormal extra-axial fluid collections.  There is parenchymal defect at the left temporoparietal lobe compatible with old infarct/encephalomalacia.  A small old lacunar infarct is again evident at the posterior limb of the right internal capsule.  Scattered low-attenuation foci are seen within the periventricular white matter suspicious for small vessel ischemic changes.  Intracranial atherosclerosis is seen.  Cerebellar tonsils extend caudally to the level of the foramen magnum.  There is beam hardening artifact at the skull base.  Visualized portions the  paranasal sinuses and mastoid air cells are relatively clear.  A few defects noted to the external auditory canals bilaterally suggesting cerumen.  Clinical correlation is indicated                                       X-Ray Chest AP Portable (In process)                      Admission on 08/02/2024   Component Date Value Ref Range Status    Influenza A PCR 08/02/2024 Not Detected  Not Detected Final    Influenza B PCR 08/02/2024 Not Detected  Not Detected Final    SARS-CoV-2 PCR 08/02/2024 Not Detected  Not Detected, Negative Final    Sodium 08/02/2024 141  136 - 145 mmol/L Final    Potassium 08/02/2024 4.3  3.5 - 5.1 mmol/L Final    Chloride 08/02/2024 107  98 - 107 mmol/L Final    CO2 08/02/2024 22 (L)  23 - 31 mmol/L Final    Glucose 08/02/2024 158 (H)  82 - 115 mg/dL Final    Blood Urea Nitrogen 08/02/2024 43.0 (H)  9.8 - 20.1 mg/dL Final    Creatinine 08/02/2024 2.24 (H)  0.55 - 1.02 mg/dL Final    Calcium 08/02/2024 10.3 (H)  8.4 - 10.2 mg/dL Final    Protein Total 08/02/2024 6.3  5.8 - 7.6 gm/dL Final    Albumin 08/02/2024 2.7 (L)  3.4 - 4.8 g/dL Final    Globulin 08/02/2024 3.6 (H)  2.4 - 3.5 gm/dL Final    Albumin/Globulin Ratio 08/02/2024 0.8 (L)  1.1 - 2.0 ratio Final    Bilirubin Total 08/02/2024 0.4  <=1.5 mg/dL Final    ALP 08/02/2024 57  40 - 150 unit/L Final    ALT 08/02/2024 21  0 - 55 unit/L Final    AST 08/02/2024 20  5 - 34 unit/L Final    eGFR 08/02/2024 23  mL/min/1.73/m2 Final    Anion Gap 08/02/2024 12.0  mEq/L Final    BUN/Creatinine Ratio 08/02/2024 19   Final    Lactic Acid Level 08/02/2024 1.3  0.5 - 2.2 mmol/L Final    QRS Duration 08/02/2024 74  ms In process    OHS QTC Calculation 08/02/2024 474  ms In process    WBC 08/02/2024 17.32 (H)  4.50 - 11.50 x10(3)/mcL Final    RBC 08/02/2024 3.59 (L)  4.20 - 5.40 x10(6)/mcL Final    Hgb 08/02/2024 9.1 (L)  12.0 - 16.0 g/dL Final    Hct 08/02/2024 29.7 (L)  37.0 - 47.0 % Final    MCV 08/02/2024 82.7  80.0 - 94.0 fL Final    MCH 08/02/2024  25.3 (L)  27.0 - 31.0 pg Final    MCHC 08/02/2024 30.6 (L)  33.0 - 36.0 g/dL Final    RDW 08/02/2024 15.0  11.5 - 17.0 % Final    Platelet 08/02/2024 261  130 - 400 x10(3)/mcL Final    MPV 08/02/2024 11.9 (H)  7.4 - 10.4 fL Final    Neut % 08/02/2024 87.6  % Final    Lymph % 08/02/2024 3.8  % Final    Mono % 08/02/2024 7.4  % Final    Eos % 08/02/2024 0.1  % Final    Basophil % 08/02/2024 0.2  % Final    Lymph # 08/02/2024 0.66  0.6 - 4.6 x10(3)/mcL Final    Neut # 08/02/2024 15.16 (H)  2.1 - 9.2 x10(3)/mcL Final    Mono # 08/02/2024 1.29  0.1 - 1.3 x10(3)/mcL Final    Eos # 08/02/2024 0.01  0 - 0.9 x10(3)/mcL Final    Baso # 08/02/2024 0.04  <=0.2 x10(3)/mcL Final    IG# 08/02/2024 0.16 (H)  0 - 0.04 x10(3)/mcL Final    IG% 08/02/2024 0.9  % Final    Troponin-I 08/02/2024 0.031  0.000 - 0.045 ng/mL Final       Medications   sodium chloride 0.9% bolus 1,000 mL 1,000 mL ( Intravenous Verify Only 8/2/24 1203)   piperacillin-tazobactam (ZOSYN) 4.5 g in D5W 100 mL IVPB (MB+) (0 g Intravenous Stopped 8/2/24 1202)     Medical Decision Making  I rechecked the patient multiple times during her ED stay, most recently at 1230.  I did remove her from the oxygen and she promptly desaturated into the mid 80s with prompt resolution after being placed on nasal cannula.  She is resting comfortably without significant change in her mental status.  Her x-ray appears to have a right lower lobe infiltrate by my interpretation.  She may have aspirated.  She has been covered with broad-spectrum antibiotics.  She has a acute kidney injury in his receiving IV fluids.  She will need admission for further evaluation and management.      In order to obtain a more complete history, additional information was obtained from:  EMS.    I reviewed the following prior external notes:  Previous hospital stay for acute kidney injury and volume depletion.    Discussion with other physicians/healthcare providers:      Decision to  admit/transfer/discharge:  After my evaluation of the patient and interpretation of all relevant information, the decision has been made to admit the patient.    After discussion with the patient/decision maker(s), the patient is full code.    I independently reviewed and interpreted any laboratory tests, radiographic images, EKGs, rhythm strips, and other diagnostic tests that were obtained during this Emergency Department visit.    Drug/medication management:  Parenteral controlled substances and other dangerous medications, if administered, can be found in the order section of this chart.  I reviewed the patient's home medications and made changes/adjustments as medically indicated.  Any new medications are documented under the prescription section of this chart.    Social determinants of health addressed during this ED visit:  The patient/caregiver knowledge deficit about today's condition and treatment plan was addressed by me through appropriate patient/caregiver education.      Procedures/plan for surgery:  None.    Critical care:  Time spent:  40 minutes.  The high probability of significant deterioration in the patient's condition required the highest level of my care and the need to intervene urgently.  I provided critical care services requiring my direct management to review nursing notes, previous records, orders, repeat evaluations, continuous assessment, ongoing care, and transfer to the admitting physician.  This time excludes time for separately billed procedures.    Amount and/or Complexity of Data Reviewed  Labs: ordered.  Radiology: ordered.                                      Clinical Impression:  Final diagnoses:  [R41.82] Altered mental status  [J69.0] Aspiration pneumonia of right lung, unspecified aspiration pneumonia type, unspecified part of lung (Primary)  [N17.9] Acute kidney injury          ED Disposition Condition    Observation Stable                Peewee Chavez MD  08/03/24  [id number]

## 2024-08-02 NOTE — H&P
[facility] - Medical Surgical Unit    History & Physical      Patient Name: Thu Branch  MRN: [id number]  Admission Date: 8/2/2024  Attending Physician: Ernesto Mehta MD   Primary Care Provider: Joseph Vides MD         Patient information was obtained from patient, past medical records, and ER records.     Subjective:     Principal Problem:Acute kidney injury    Chief Complaint:   Chief Complaint   Patient presents with    Altered Mental Status     Sent from [facility] for low O2 sat of 88% RA, AMS and lethargic        HPI:   Ms. Branch is a 73-year-old female, NH patient was transferred to the ED earlier today due to staff noting change in her mental status as well as hypoxia as noted on room air saturations.  Her reported room air saturation there was 88%.  In the ED while on RA O2 saturation noted to fall into the mid 80s.  With 2 L NC O2 saturations improved into the mid-to-high 90s.  Evaluation included CT head which was unremarkable for acute changes, CXR was thought to show RLL infiltrate and routine lab work which revealed leukocytosis and elevated renal indices consistent with GADIEL.  While in the ED was also noted to have 2 episodes of diarrhea following which a rectal tube was inserted.  Urinalysis was ordered but not collected in the ED. urinary catheter was placed following the patient's arrival to the /surge unit.  On my seeing her was awake and answered questions but did not initiate conversation.  Denied feeling short of breath, denied pain.  She did not know why she was sent to the emergency department.    Past Medical History:   Diagnosis Date    Coronary artery disease     Depression     Hypertension     Stroke        Past Surgical History:   Procedure Laterality Date    CORONARY ARTERY BYPASS GRAFT         Review of patient's allergies indicates:   Allergen Reactions    Adhesive tape-silicones Blisters    Chlorpheniramine-pseudoephed Other (See Comments)    Codeine  Itching    Morphine Itching     Other reaction(s): Unknown    Statins-hmg-coa reductase inhibitors Nausea Only and Rash     Other reaction(s): muscle weakness       No current facility-administered medications on file prior to encounter.     Current Outpatient Medications on File Prior to Encounter   Medication Sig    citalopram (CELEXA) 20 MG tablet Take 30 mg by mouth once daily.    donepeziL (ARICEPT) 10 MG tablet Take 10 mg by mouth 2 (two) times daily.    dulaglutide (TRULICITY) 1.5 mg/0.5 mL pen injector Inject 1.5 mg into the skin every 7 days.    hydrocortisone (CORTEF) 5 MG Tab Take 5 mg by mouth once daily.    LINZESS 290 mcg Cap capsule Take 290 mcg by mouth once daily.    losartan (COZAAR) 100 MG tablet Take 1 tablet (100 mg total) by mouth once daily.    memantine (NAMENDA) 10 MG Tab Take 1 tablet (10 mg total) by mouth 2 (two) times daily.    oxyCODONE-acetaminophen (PERCOCET) 5-325 mg per tablet Take 1 tablet by mouth 2 (two) times daily.    pregabalin (LYRICA) 100 MG capsule Take 100 mg by mouth 2 (two) times daily.    senna (SENOKOT) 8.6 mg tablet Take 1 tablet by mouth once daily.    traZODone (DESYREL) 100 MG tablet Take 100 mg by mouth every evening.    vitamin D (VITAMIN D3) 1000 units Tab Take 2,000 Units by mouth once daily.    vitamin renal formula, B-complex-vitamin c-folic acid, (NEPHROCAP) 1 mg Cap Take 1 capsule by mouth once daily.    ALPRAZolam (XANAX) 0.5 MG tablet Take 0.5 tablets (0.25 mg total) by mouth 3 (three) times daily as needed for Anxiety.    amLODIPine (NORVASC) 5 MG tablet Take 1 tablet (5 mg total) by mouth once daily.    ascorbic acid, vitamin C, (VITAMIN C) 500 MG tablet Take 500 mg by mouth once daily.    bisacodyL (DULCOLAX) 10 mg Supp Place 1 suppository (10 mg total) rectally daily as needed.    lactulose (CHRONULAC) 10 gram/15 mL solution 45 mL every day Orally Once a day    linaCLOtide (LINZESS) 145 mcg Cap capsule Take 290 mcg by mouth.    melatonin 5 mg Cap Take  1 mg by mouth nightly.    methylphenidate HCl (RITALIN) 5 MG tablet Take 1 tablet (5 mg total) by mouth once daily.     Family History    None       Tobacco Use    Smoking status: Never    Smokeless tobacco: Never   Substance and Sexual Activity    Alcohol use: Not Currently    Drug use: Never    Sexual activity: Not on file     Review of Systems   Constitutional: Negative.    Respiratory: Negative.     Cardiovascular: Negative.    Gastrointestinal:  Positive for diarrhea. Negative for nausea and vomiting.   Genitourinary: Negative.    Neurological: Negative.    Psychiatric/Behavioral: Negative.       Objective:     Vital Signs (Most Recent):  Temp: 99.3 °F (37.4 °C) (08/02/24 1432)  Pulse: 60 (08/02/24 1300)  Resp: 15 (08/02/24 1300)  BP: 108/66 (08/02/24 1432)  SpO2: 97 % (08/02/24 1300) Vital Signs (24h Range):  Temp:  [98.1 °F (36.7 °C)-99.3 °F (37.4 °C)] 99.3 °F (37.4 °C)  Pulse:  [56-66] 60  Resp:  [14-19] 15  SpO2:  [91 %-98 %] 97 %  BP: ()/(39-82) 108/66     Weight: 96.2 kg (212 lb)  Body mass index is 38.78 kg/m².    Physical exam  Constitution-obese, normally developed female in NAD  Eyes-PERRL, EOMI; patient has consensual gaze non directed gaze; conjunctiva pink  HENT-normocephalic, atraumatic  Neck-supple  Respiratory-mildly labored respirations; exhibits paradoxical movements of the abdominal wall; upper expiratory noise secondary to ineffective cough; peripheral fields CTA with good air movement  Heart-RRR; normal S1 and S2; no murmurs, gallops  Abdomen-soft, nontender, nondistended  Genitourinary-deferred  Musculoskeletal-no joint abnormalities, normal ROM throughout  Skin-warm, dry; no rashes  Neurologic-awake, oriented to person, place; grossly nonfocal exam    Scheduled Meds:   albuterol-ipratropium  3 mL Nebulization Q6H    citalopram  30 mg Oral Daily    donepeziL  10 mg Oral BID    enoxparin  30 mg Subcutaneous Daily    famotidine  20 mg Oral Daily    hydrocortisone sodium succinate  100  "mg Intravenous Q8H    memantine  10 mg Oral BID    mupirocin   Nasal BID     Continuous Infusions:   0.9% NaCl   Intravenous Continuous         PRN Meds:.  Current Facility-Administered Medications:     acetaminophen, 650 mg, Oral, Q4H PRN    acetaminophen, 650 mg, Oral, Q8H PRN    dextrose 10%, 12.5 g, Intravenous, PRN    dextrose 10%, 25 g, Intravenous, PRN    glucagon (human recombinant), 1 mg, Intramuscular, PRN    glucose, 16 g, Oral, PRN    glucose, 24 g, Oral, PRN    insulin aspart U-100, 0-10 Units, Subcutaneous, QID (AC + HS) PRN    naloxone, 0.02 mg, Intravenous, PRN    sodium chloride 0.9%, 10 mL, Intravenous, Q12H PRN      Significant Labs: All pertinent labs within the past 24 hours have been reviewed.  A1C: No results for input(s): "HGBA1C" in the last 4320 hours.  ABGs: No results for input(s): "PH", "PCO2", "HCO3", "POCSATURATED", "BE", "TOTALHB", "COHB", "METHB", "O2HB", "POCFIO2", "PO2" in the last 48 hours.  Bilirubin:   Recent Labs   Lab 08/02/24  1042   BILITOT 0.4     Blood Culture: No results for input(s): "LABBLOO" in the last 48 hours.  CBC:   Recent Labs   Lab 08/02/24  1042   WBC 17.32*   HGB 9.1*   HCT 29.7*        CMP:   Recent Labs   Lab 08/02/24  1042      K 4.3      CO2 22*   BUN 43.0*   CREATININE 2.24*   CALCIUM 10.3*   ALBUMIN 2.7*   BILITOT 0.4   ALKPHOS 57   AST 20   ALT 21     Lactic Acid:   Recent Labs   Lab 08/02/24  1042   LACTATE 1.3     Magnesium: No results for input(s): "MG" in the last 48 hours.  POCT Glucose: No results for input(s): "POCTGLUCOSE" in the last 48 hours.  Urine Culture: No results for input(s): "LABURIN" in the last 48 hours.  Urine Studies:   Recent Labs   Lab 08/02/24  1451   COLORU Brown*   APPEARANCEUA Cloudy*   PHUR 6.0   PROTEINUA 3+*   GLUCUA Negative   BILIRUBINUA 1+*   OCCULTUA 3+*   NITRITE Negative   UROBILINOGEN 0.2   LEUKOCYTESUR 3+*   RBCUA 11-20*   WBCUA >100*   BACTERIA Many*       Significant Imaging: "   CXR  Impression:  1. Mild cardiomegaly  2. Atherosclerosis  3. Atelectasis and/or scarring left lower lung    Assessment/Plan:   Metabolic encephalopathy  Multifactorial; patient has a UTI, possible pneumonia, volume depletion and GADIEL all of which we will contribute to altered mental status    Acute kidney injury/CKD, stage IIIB  Likely due to volume depletion; exacerbated by ARB  Urinary catheter inserted to monitor urine output closely  Avoid nephrotoxic agents/renal dose medications  Patient received 1 L bolus NS in ED; continue liberal IV hydration    UTI  Continue IV Zosyn pending culture results    Possible RLL pneumonia/hypoxemia  Bronchodilator therapy, supplemental O2 as needed as patient hypoxic on room air  Continue current antibiotics  Check procalcitonin with a.m. labs    Diarrhea  Etiology: Infectious versus iatrogenic (patient is on multiple laxative type medications as well as Linzess)  C diff studies neg  Stool studies have been sent  Continue rectal tube until stools become formed    Diabetes mellitus  Monitor blood sugars with Accu-Cheks and SSI  Add basal insulin as necessary    Hypertension  Blood pressure is trending low  Hold meds for now    Patient on chronic hydrocortisone  Reason is unclear but will stress dose IV hydrocortisone over next 24 hours then return to oral dosing    GI prophylaxis: Famotidine    Code status: DNR        Active Diagnoses:    Diagnosis Date Noted POA    PRINCIPAL PROBLEM:  Acute kidney injury [N17.9] 05/12/2024 Yes      Problems Resolved During this Admission:     VTE Risk Mitigation (From admission, onward)           Ordered     enoxaparin injection 30 mg  Daily         08/02/24 1456     Place sequential compression device  Until discontinued         08/02/24 1456     IP VTE HIGH RISK PATIENT  Once         08/02/24 1456                  Patient screened for social drivers of health:  Housing instability  Transportation needs  Utility difficulties  Interpersonal  safety  ---no needs identified        Ernesto Mehta MD  Department of Hospital Medicine   [facility] - [facility] Surgical Unit

## 2024-08-03 LAB
ABS NEUT CALC (OHS): 11.95 X10(3)/MCL (ref 2.1–9.2)
ACINETOBACTER CALCOACETICUS-BAUMANNII COMPLEX (OHS): NOT DETECTED
ALBUMIN SERPL-MCNC: 2.5 G/DL (ref 3.4–4.8)
ALBUMIN/GLOB SERPL: 0.7 RATIO (ref 1.1–2)
ALP SERPL-CCNC: 54 UNIT/L (ref 40–150)
ALT SERPL-CCNC: 23 UNIT/L (ref 0–55)
ANION GAP SERPL CALC-SCNC: 11 MEQ/L
AST SERPL-CCNC: 25 UNIT/L (ref 5–34)
BACTEROIDES FRAGILIS (OHS): NOT DETECTED
BILIRUB SERPL-MCNC: 0.4 MG/DL
BUN SERPL-MCNC: 47 MG/DL (ref 9.8–20.1)
C AURIS DNA BLD POS QL NAA+NON-PROBE: NOT DETECTED
C GATTII+NEOFOR DNA CSF QL NAA+NON-PROBE: NOT DETECTED
CALCIUM SERPL-MCNC: 10 MG/DL (ref 8.4–10.2)
CANDIDA ALBICANS (OHS): NOT DETECTED
CANDIDA GLABRATA (OHS): NOT DETECTED
CANDIDA KRUSEI (OHS): NOT DETECTED
CANDIDA PARAPSILOSIS (OHS): NOT DETECTED
CANDIDA TROPICALIS (OHS): NOT DETECTED
CHLORIDE SERPL-SCNC: 111 MMOL/L (ref 98–107)
CO2 SERPL-SCNC: 20 MMOL/L (ref 23–31)
CREAT SERPL-MCNC: 1.91 MG/DL (ref 0.55–1.02)
CREAT/UREA NIT SERPL: 25
CTX-M (OHS): DETECTED
ENTEROBACTER CLOACAE COMPLEX (OHS): NOT DETECTED
ENTEROBACTERALES (OHS): DETECTED
ENTEROCOCCUS FAECALIS (OHS): NOT DETECTED
ENTEROCOCCUS FAECIUM (OHS): NOT DETECTED
ERYTHROCYTE [DISTWIDTH] IN BLOOD BY AUTOMATED COUNT: 15.4 % (ref 11.5–17)
ESCHERICHIA COLI (OHS): NOT DETECTED
GFR SERPLBLD CREATININE-BSD FMLA CKD-EPI: 28 ML/MIN/1.73/M2
GLOBULIN SER-MCNC: 3.8 GM/DL (ref 2.4–3.5)
GLUCOSE SERPL-MCNC: 186 MG/DL (ref 82–115)
GLUCOSE SERPL-MCNC: 193 MG/DL (ref 70–110)
GP B STREP DNA CSF QL NAA+NON-PROBE: NOT DETECTED
HAEM INFLU DNA CSF QL NAA+NON-PROBE: NOT DETECTED
HCT VFR BLD AUTO: 30.8 % (ref 37–47)
HGB BLD-MCNC: 9 G/DL (ref 12–16)
IMP (OHS): NOT DETECTED
KLEBSIELLA AEROGENES (OHS): NOT DETECTED
KLEBSIELLA OXYTOCA (OHS): NOT DETECTED
KLEBSIELLA PNEUMONIAE GROUP (OHS): DETECTED
KPC (OHS): NOT DETECTED
L MONOCYTOG DNA CSF QL NAA+NON-PROBE: NOT DETECTED
LYMPHOCYTES NFR BLD MANUAL: 0.65 X10(3)/MCL
LYMPHOCYTES NFR BLD MANUAL: 5 % (ref 13–40)
MAGNESIUM SERPL-MCNC: 2.1 MG/DL (ref 1.6–2.6)
MCH RBC QN AUTO: 25.1 PG (ref 27–31)
MCHC RBC AUTO-ENTMCNC: 29.2 G/DL (ref 33–36)
MCR-1 (OHS): NOT DETECTED
MCV RBC AUTO: 85.8 FL (ref 80–94)
MECA/C (OHS): ABNORMAL
MECA/C AND MREJ (MRSA)(OHS): ABNORMAL
METAMYELOCYTES NFR BLD MANUAL: 1 %
MONOCYTES NFR BLD MANUAL: 0.26 X10(3)/MCL (ref 0.1–1.3)
MONOCYTES NFR BLD MANUAL: 2 % (ref 2–11)
N MEN DNA CSF QL NAA+NON-PROBE: NOT DETECTED
NDM (OHS): NOT DETECTED
NEUTROPHILS NFR BLD MANUAL: 90 % (ref 47–80)
NEUTS BAND NFR BLD MANUAL: 2 % (ref 0–11)
OVALOCYTES (OLG): SLIGHT
OXA-48-LIKE (OHS): NOT DETECTED
PLATELET # BLD AUTO: 232 X10(3)/MCL (ref 130–400)
PLATELET # BLD EST: ADEQUATE 10*3/UL
PMV BLD AUTO: 12.1 FL (ref 7.4–10.4)
POCT GLUCOSE: 152 MG/DL (ref 70–110)
POCT GLUCOSE: 157 MG/DL (ref 70–110)
POCT GLUCOSE: 186 MG/DL (ref 70–110)
POIKILOCYTOSIS BLD QL SMEAR: SLIGHT
POTASSIUM SERPL-SCNC: 3.9 MMOL/L (ref 3.5–5.1)
PROCALCITONIN SERPL-MCNC: 25.54 NG/ML
PROT SERPL-MCNC: 6.3 GM/DL (ref 5.8–7.6)
PROTEUS SPP. (OHS): NOT DETECTED
PSEUDOMONAS AERUGINOSA (OHS): NOT DETECTED
RBC # BLD AUTO: 3.59 X10(6)/MCL (ref 4.2–5.4)
RBC MORPH BLD: ABNORMAL
S ENT+BONG DNA STL QL NAA+NON-PROBE: NOT DETECTED
S PNEUM DNA CSF QL NAA+NON-PROBE: NOT DETECTED
SERRATIA MARCESCENS (OHS): NOT DETECTED
SODIUM SERPL-SCNC: 142 MMOL/L (ref 136–145)
STAPHYLOCOCCUS AUREUS (OHS): NOT DETECTED
STAPHYLOCOCCUS EPIDERMIDIS (OHS): NOT DETECTED
STAPHYLOCOCCUS LUGDUNENSIS (OHS): NOT DETECTED
STAPHYLOCOCCUS SPP. (OHS): NOT DETECTED
STENOTROPHOMONAS MALTOPHILIA (OHS): NOT DETECTED
STREPTOCOCCUS PYOGENES (GROUP A)(OHS): NOT DETECTED
STREPTOCOCCUS SPP. (OHS): NOT DETECTED
VANA/B (OHS): ABNORMAL
VIM (OHS): NOT DETECTED
WBC # BLD AUTO: 12.99 X10(3)/MCL (ref 4.5–11.5)

## 2024-08-03 PROCEDURE — 63600175 PHARM REV CODE 636 W HCPCS: Performed by: EMERGENCY MEDICINE

## 2024-08-03 PROCEDURE — 84145 PROCALCITONIN (PCT): CPT | Performed by: EMERGENCY MEDICINE

## 2024-08-03 PROCEDURE — 83735 ASSAY OF MAGNESIUM: CPT | Performed by: EMERGENCY MEDICINE

## 2024-08-03 PROCEDURE — 25000003 PHARM REV CODE 250: Performed by: EMERGENCY MEDICINE

## 2024-08-03 PROCEDURE — 27000221 HC OXYGEN, UP TO 24 HOURS

## 2024-08-03 PROCEDURE — 99900035 HC TECH TIME PER 15 MIN (STAT)

## 2024-08-03 PROCEDURE — 85027 COMPLETE CBC AUTOMATED: CPT | Performed by: EMERGENCY MEDICINE

## 2024-08-03 PROCEDURE — 11000001 HC ACUTE MED/SURG PRIVATE ROOM

## 2024-08-03 PROCEDURE — 25000242 PHARM REV CODE 250 ALT 637 W/ HCPCS: Performed by: EMERGENCY MEDICINE

## 2024-08-03 PROCEDURE — 80053 COMPREHEN METABOLIC PANEL: CPT | Performed by: EMERGENCY MEDICINE

## 2024-08-03 PROCEDURE — 21400001 HC TELEMETRY ROOM

## 2024-08-03 PROCEDURE — 94640 AIRWAY INHALATION TREATMENT: CPT

## 2024-08-03 PROCEDURE — 27000207 HC ISOLATION

## 2024-08-03 PROCEDURE — 85007 BL SMEAR W/DIFF WBC COUNT: CPT | Performed by: EMERGENCY MEDICINE

## 2024-08-03 PROCEDURE — 36415 COLL VENOUS BLD VENIPUNCTURE: CPT | Performed by: EMERGENCY MEDICINE

## 2024-08-03 PROCEDURE — 94761 N-INVAS EAR/PLS OXIMETRY MLT: CPT

## 2024-08-03 RX ORDER — SODIUM CHLORIDE, SODIUM LACTATE, POTASSIUM CHLORIDE, CALCIUM CHLORIDE 600; 310; 30; 20 MG/100ML; MG/100ML; MG/100ML; MG/100ML
INJECTION, SOLUTION INTRAVENOUS CONTINUOUS
Status: DISCONTINUED | OUTPATIENT
Start: 2024-08-03 | End: 2024-08-09 | Stop reason: HOSPADM

## 2024-08-03 RX ADMIN — SODIUM CHLORIDE: 9 INJECTION, SOLUTION INTRAVENOUS at 04:08

## 2024-08-03 RX ADMIN — IPRATROPIUM BROMIDE AND ALBUTEROL SULFATE 3 ML: .5; 3 SOLUTION RESPIRATORY (INHALATION) at 07:08

## 2024-08-03 RX ADMIN — CITALOPRAM HYDROBROMIDE 30 MG: 20 TABLET ORAL at 09:08

## 2024-08-03 RX ADMIN — IPRATROPIUM BROMIDE AND ALBUTEROL SULFATE 3 ML: .5; 3 SOLUTION RESPIRATORY (INHALATION) at 01:08

## 2024-08-03 RX ADMIN — SODIUM CHLORIDE: 9 INJECTION, SOLUTION INTRAVENOUS at 12:08

## 2024-08-03 RX ADMIN — DONEPEZIL HYDROCHLORIDE 10 MG: 5 TABLET, FILM COATED ORAL at 09:08

## 2024-08-03 RX ADMIN — HYDROCORTISONE SODIUM SUCCINATE 100 MG: 100 INJECTION, POWDER, FOR SOLUTION INTRAMUSCULAR; INTRAVENOUS at 06:08

## 2024-08-03 RX ADMIN — HYDROCORTISONE SODIUM SUCCINATE 50 MG: 100 INJECTION, POWDER, FOR SOLUTION INTRAMUSCULAR; INTRAVENOUS at 09:08

## 2024-08-03 RX ADMIN — MEMANTINE HYDROCHLORIDE 10 MG: 5 TABLET ORAL at 08:08

## 2024-08-03 RX ADMIN — INSULIN ASPART 1 UNITS: 100 INJECTION, SOLUTION INTRAVENOUS; SUBCUTANEOUS at 09:08

## 2024-08-03 RX ADMIN — SODIUM CHLORIDE, POTASSIUM CHLORIDE, SODIUM LACTATE AND CALCIUM CHLORIDE: 600; 310; 30; 20 INJECTION, SOLUTION INTRAVENOUS at 02:08

## 2024-08-03 RX ADMIN — PIPERACILLIN AND TAZOBACTAM 4.5 G: 4; .5 INJECTION, POWDER, LYOPHILIZED, FOR SOLUTION INTRAVENOUS; PARENTERAL at 04:08

## 2024-08-03 RX ADMIN — IPRATROPIUM BROMIDE AND ALBUTEROL SULFATE 3 ML: .5; 3 SOLUTION RESPIRATORY (INHALATION) at 08:08

## 2024-08-03 RX ADMIN — FAMOTIDINE 20 MG: 20 TABLET ORAL at 09:08

## 2024-08-03 RX ADMIN — MUPIROCIN 1 G: 20 OINTMENT TOPICAL at 08:08

## 2024-08-03 RX ADMIN — ENOXAPARIN SODIUM 30 MG: 30 INJECTION SUBCUTANEOUS at 04:08

## 2024-08-03 RX ADMIN — PIPERACILLIN AND TAZOBACTAM 4.5 G: 4; .5 INJECTION, POWDER, LYOPHILIZED, FOR SOLUTION INTRAVENOUS; PARENTERAL at 05:08

## 2024-08-03 RX ADMIN — MEMANTINE HYDROCHLORIDE 10 MG: 5 TABLET ORAL at 09:08

## 2024-08-03 RX ADMIN — INSULIN ASPART 2 UNITS: 100 INJECTION, SOLUTION INTRAVENOUS; SUBCUTANEOUS at 12:08

## 2024-08-03 RX ADMIN — INSULIN ASPART 2 UNITS: 100 INJECTION, SOLUTION INTRAVENOUS; SUBCUTANEOUS at 05:08

## 2024-08-03 RX ADMIN — HYDROCORTISONE SODIUM SUCCINATE 50 MG: 100 INJECTION, POWDER, FOR SOLUTION INTRAMUSCULAR; INTRAVENOUS at 04:08

## 2024-08-03 RX ADMIN — MUPIROCIN 1 G: 20 OINTMENT TOPICAL at 09:08

## 2024-08-03 RX ADMIN — DONEPEZIL HYDROCHLORIDE 10 MG: 5 TABLET, FILM COATED ORAL at 08:08

## 2024-08-03 RX ADMIN — INSULIN ASPART 2 UNITS: 100 INJECTION, SOLUTION INTRAVENOUS; SUBCUTANEOUS at 06:08

## 2024-08-03 NOTE — PLAN OF CARE
Problem: Adult Inpatient Plan of Care  Goal: Plan of Care Review  Reactivated  Goal: Patient-Specific Goal (Individualized)  Reactivated  Goal: Absence of Hospital-Acquired Illness or Injury  Reactivated  Goal: Optimal Comfort and Wellbeing  Reactivated  Goal: Readiness for Transition of Care  Reactivated     Problem: Diabetes Comorbidity  Goal: Blood Glucose Level Within Targeted Range  Reactivated     Problem: Acute Kidney Injury/Impairment  Goal: Fluid and Electrolyte Balance  Reactivated  Goal: Improved Oral Intake  Reactivated  Goal: Effective Renal Function  Reactivated     Problem: Fall Injury Risk  Goal: Absence of Fall and Fall-Related Injury  Reactivated     Problem: Infection  Goal: Absence of Infection Signs and Symptoms  Reactivated     Problem: Skin Injury Risk Increased  Goal: Skin Health and Integrity  Reactivated     Problem: Pneumonia  Goal: Fluid Balance  Reactivated  Goal: Resolution of Infection Signs and Symptoms  Reactivated  Goal: Effective Oxygenation and Ventilation  Reactivated

## 2024-08-03 NOTE — PROGRESS NOTES
Inpatient Nutrition Evaluation    Admit Date: 8/2/2024   Total duration of encounter: 1 day    Nutrition Recommendation/Prescription     Recommend 1800 Calorie Diabetic Diet when able to advance diet beyond CL.   May consider adding Banatrol Plus to assist with diarrhea/loose stools.   Monitor Renal indices and the need for adding Renal restriction to current diet order.   Monitor need for ONS.   Monitor diet advancement, intake, weight, and labs.     RD following and available as needed. Thank you.     Nutrition Assessment     Chart Review    Reason Seen: continuous nutrition monitoring and malnutrition screening tool (MST)    Malnutrition Screening Tool Results   Have you recently lost weight without trying?: Unsure  Have you been eating poorly because of a decreased appetite?: Yes   MST Score: 3     Diagnosis:  Acute Kidney Injury.     Relevant Medical History:   Coronary artery disease      Depression      Hypertension      Stroke          Nutrition-Related Medications:   IV Fluids: 0.9% NaCl@100mL/hr.   Donepezil; Lovenox; Insulin; Memantine    Nutrition-Related Labs:  8/3: WBC 12.99(H); H/H 9.0/30.8(L); BUN/Crea 47.0/1.91(H); Cl 111(H); CO2 20(L); (H); Alb 2.5(L)  6/29: A1C 7.1(H)    Diet Order: Diet Clear Liquid  Oral Supplement Order: none  Appetite/Oral Intake: not applicable/not applicable No recorded intake yet.   Factors Affecting Nutritional Intake: decreased appetite, diarrhea, and Altered Mental Status.   Food/Restorationism/Cultural Preferences: none reported  Food Allergies: none reported    Skin Integrity: bruised (ecchymotic)  Wound(s):       Comments    8/3: Pt arrived from the nursing home with altered mental status and diarrhea. Appetite loss noted on nursing admit screen; however, unsure of time frame. No recent weight loss noted/reported. Pt currently with rectal tube secondary to diarrhea. C-Diff came back negative. Noted pt on several medications that could be causing diarrhea. MD  "addressing.  Pt currently on CL diet. No recorded intake yet. Will monitor. Pt may benefit from adding Banatrol Plus to assist with diarrhea/loose stools. Labs and meds reviewed. Renal indices concerning. May consider adding renal restriction to current diet order. A1C and GLU elevated. Recommend Diabetic Diet when able to advance diet beyond CL. Will continue to monitor during stay.    Anthropometrics    Height: 5' 2" (157.5 cm)    Last Weight: 96.5 kg (212 lb 11.9 oz) (08/02/24 1432) Weight Method: Bed Scale  BMI (Calculated): 38.9  BMI Classification: obese grade II (BMI 35-39.9)     Ideal Body Weight (IBW), Female: 110 lb     % Ideal Body Weight, Female (lb): 193.41 %                             Usual Weight Provided By: unable to obtain usual weight    Wt Readings from Last 5 Encounters:   08/02/24 96.5 kg (212 lb 11.9 oz)   05/14/24 92.2 kg (203 lb 4.2 oz)   07/24/23 90.7 kg (200 lb)   06/30/23 113.2 kg (249 lb 9 oz)   10/11/22 87.2 kg (192 lb 3.9 oz)     Weight Change(s) Since Admission:  Admit Weight: 96.2 kg (212 lb) (08/02/24 1014)      Patient Education    Not applicable.    Monitoring & Evaluation     Dietitian will monitor food and beverage intake, weight, weight change, electrolyte/renal panel, glucose/endocrine profile, and gastrointestinal profile.  Nutrition Risk/Follow-Up: low (follow-up in 5-7 days)  Patients assigned 'low nutrition risk' status do not qualify for a full nutritional assessment but will be monitored and re-evaluated in a 5-7 day time period. Please consult if re-evaluation needed sooner.  "

## 2024-08-03 NOTE — PROGRESS NOTES
[facility] Medicine  Progress Note    Patient Name: Thu Branch  MRN: [id number]  Patient Class: IP- Inpatient   Admission Date: 8/2/2024  Length of Stay: 1 days  Attending Physician: Ernesto Mehta MD  Primary Care Provider: Joseph Vides MD        Subjective:     Principal Problem:Acute kidney injury    Interval History:   HPI: Ms. Branch is a 73-year-old female, NH patient was transferred to the ED earlier today due to staff noting change in her mental status as well as hypoxia as noted on room air saturations.  Her reported room air saturation there was 88%.  In the ED while on RA O2 saturation noted to fall into the mid 80s.  With 2 L NC O2 saturations improved into the mid-to-high 90s.  Evaluation included CT head which was unremarkable for acute changes, CXR was thought to show RLL infiltrate and routine lab work which revealed leukocytosis and elevated renal indices consistent with GADIEL.  While in the ED was also noted to have 2 episodes of diarrhea following which a rectal tube was inserted.  Urinalysis was ordered but not collected in the ED. urinary catheter was placed following the patient's arrival to the /surge unit.  On my seeing her was awake and answered questions but did not initiate conversation.  Denied feeling short of breath, denied pain.  She did not know why she was sent to the emergency department.    8/3-when seen on rounds today appeared comfortable; she was very sleepy, awakened to light touch and would answer questions with 1 or 2 word answers but quickly fall back to sleep when not stimulated; continues to have watery stools      Objective:     Vital Signs (Most Recent):  Temp: 97.7 °F (36.5 °C) (08/03/24 1149)  Pulse: 64 (08/03/24 1149)  Resp: 18 (08/03/24 0706)  BP: 132/60 (08/03/24 1149)  SpO2: 95 % (08/03/24 1149) Vital Signs (24h Range):  Temp:  [96.8 °F (36 °C)-99.3 °F (37.4 °C)] 97.7 °F (36.5 °C)  Pulse:  [55-68]  64  Resp:  [15-24] 18  SpO2:  [20 %-100 %] 95 %  BP: (107-161)/(47-84) 132/60     Weight: 96.5 kg (212 lb 11.9 oz)  Body mass index is 38.91 kg/m².    Intake/Output Summary (Last 24 hours) at 8/3/2024 1234  Last data filed at 8/3/2024 0800  Gross per 24 hour   Intake 1463.14 ml   Output 1000 ml   Net 463.14 ml      Physical exam  Constitution-obese, normally developed female in NAD  Eyes-PERRL, EOMI; patient has consensual gaze non directed gaze with random eye movements; conjunctiva pink  HENT-normocephalic, atraumatic  Neck-supple  Respiratory-normal respirations; CTA with good air movement  Heart-RRR; normal S1 and S2; no murmurs, gallops  Abdomen-soft, nontender, nondistended  Genitourinary-deferred  Musculoskeletal-no joint abnormalities, normal ROM throughout  Skin-warm, dry; no rashes  Neurologic-very sleepy; awakens with tactile stimuli but quickly falls back to sleep; she is essentially functionally quadriplegic    Scheduled Meds:   albuterol-ipratropium  3 mL Nebulization Q6H    citalopram  30 mg Oral Daily    donepeziL  10 mg Oral BID    enoxparin  30 mg Subcutaneous Daily    famotidine  20 mg Oral Daily    hydrocortisone sodium succinate  50 mg Intravenous Q8H    memantine  10 mg Oral BID    mupirocin   Nasal BID    piperacillin-tazobactam (Zosyn) IV (PEDS and ADULTS) (extended infusion is not appropriate)  4.5 g Intravenous Q12H     Continuous Infusions:   0.9% NaCl   Intravenous Continuous 100 mL/hr at 08/03/24 0729 Rate Verify at 08/03/24 0729     PRN Meds:.  Current Facility-Administered Medications:     acetaminophen, 650 mg, Oral, Q4H PRN    acetaminophen, 650 mg, Oral, Q8H PRN    dextrose 10%, 12.5 g, Intravenous, PRN    dextrose 10%, 25 g, Intravenous, PRN    glucagon (human recombinant), 1 mg, Intramuscular, PRN    glucose, 16 g, Oral, PRN    glucose, 24 g, Oral, PRN    insulin aspart U-100, 0-10 Units, Subcutaneous, QID (AC + HS) PRN    naloxone, 0.02 mg, Intravenous, PRN    sodium chloride  "0.9%, 10 mL, Intravenous, Q12H PRN    Significant Labs: All pertinent labs within the past 24 hours have been reviewed.  Blood Culture: No results for input(s): "LABBLOO" in the last 48 hours.  CBC:   Recent Labs   Lab 08/02/24  1042 08/03/24  0328   WBC 17.32* 12.99*   HGB 9.1* 9.0*   HCT 29.7* 30.8*    232     CMP:   Recent Labs   Lab 08/02/24  1042 08/03/24  0328    142   K 4.3 3.9    111*   CO2 22* 20*   BUN 43.0* 47.0*   CREATININE 2.24* 1.91*   CALCIUM 10.3* 10.0   ALBUMIN 2.7* 2.5*   BILITOT 0.4 0.4   ALKPHOS 57 54   AST 20 25   ALT 21 23     Magnesium:   Recent Labs   Lab 08/03/24  0328   MG 2.10     POCT Glucose:   Recent Labs   Lab 08/02/24  1924 08/02/24  2340 08/03/24  0614   POCTGLUCOSE 190* 215* 186*     Urine Culture:   Recent Labs   Lab 08/02/24  1451   LABURIN >/= 100,000 colonies/ml Gram-negative Rods*     Urine Studies:   Recent Labs   Lab 08/02/24  1451   COLORU Brown*   APPEARANCEUA Cloudy*   PHUR 6.0   PROTEINUA 3+*   GLUCUA Negative   BILIRUBINUA 1+*   OCCULTUA 3+*   NITRITE Negative   UROBILINOGEN 0.2   LEUKOCYTESUR 3+*   RBCUA 11-20*   WBCUA >100*   BACTERIA Many*       Significant Imaging:   CXR  Impression:  1. Mild cardiomegaly  2. Atherosclerosis  3. Atelectasis and/or scarring left lower lung    Assessment/Plan:   Metabolic encephalopathy  Multifactorial; patient has a UTI, possible pneumonia, volume depletion and GADIEL all of which we will contribute to altered mental status     Acute kidney injury/CKD, stage IIIB  Likely due to volume depletion; exacerbated by ARB  Continue indwelling urinary catheter  Avoid nephrotoxic agents/renal dose medications  Renal indices-BUN remains elevated but stable; creatinine trending down  Change IV fluids to LR due to developing hyperchloremia     UTI  Urine culture-GNR's  Continue IV Zosyn pending culture results    Gram-negative (Klebsiella) sepsis  BC's 2/2 growing ESBL+ Klebsiella pneumoniae, sensitivities pending  WBC " improved  Continue IV Zosyn     Possible RLL pneumonia/hypoxemia  Bronchodilator therapy, supplemental O2 as needed as patient hypoxic on room air  O2 saturation on 3 L %  Continue current antibiotics  Procalcitonin pending     Diarrhea  Etiology: Infectious versus iatrogenic (patient is on multiple laxative type medications as well as Linzess); these were discontinued  C diff studies neg  GI panel negative  Continue rectal tube until stools become formed     Diabetes mellitus  Monitor blood sugars with Accu-Cheks and SSI  Add basal insulin as necessary     Hypertension  Blood pressure, improved  Continue to hold meds     Patient on chronic hydrocortisone  Continue IV hydrocortisone taper    Extreme debility  Patient is bed ridden, requires total care, functionally quadriplegic  Obtain swallowing evaluation     GI prophylaxis: Famotidine     Code status: DNR   Active Diagnoses:    Diagnosis Date Noted POA    PRINCIPAL PROBLEM:  Acute kidney injury [N17.9] 05/12/2024 Yes      Problems Resolved During this Admission:     VTE Risk Mitigation (From admission, onward)           Ordered     enoxaparin injection 30 mg  Daily         08/02/24 1456     IP VTE HIGH RISK PATIENT  Once         08/02/24 1456                       Ernesto Mehta MD  Department of Hospital Medicine   [facility]

## 2024-08-04 LAB
ANION GAP SERPL CALC-SCNC: 11 MEQ/L
BASOPHILS # BLD AUTO: 0.01 X10(3)/MCL
BASOPHILS NFR BLD AUTO: 0.1 %
BUN SERPL-MCNC: 41 MG/DL (ref 9.8–20.1)
CALCIUM SERPL-MCNC: 9.9 MG/DL (ref 8.4–10.2)
CHLORIDE SERPL-SCNC: 111 MMOL/L (ref 98–107)
CO2 SERPL-SCNC: 19 MMOL/L (ref 23–31)
CREAT SERPL-MCNC: 1.16 MG/DL (ref 0.55–1.02)
CREAT/UREA NIT SERPL: 35
EOSINOPHIL # BLD AUTO: 0 X10(3)/MCL (ref 0–0.9)
EOSINOPHIL NFR BLD AUTO: 0 %
ERYTHROCYTE [DISTWIDTH] IN BLOOD BY AUTOMATED COUNT: 15 % (ref 11.5–17)
GFR SERPLBLD CREATININE-BSD FMLA CKD-EPI: 51 ML/MIN/1.73/M2
GLUCOSE SERPL-MCNC: 144 MG/DL (ref 82–115)
HCT VFR BLD AUTO: 30.2 % (ref 37–47)
HGB BLD-MCNC: 9.1 G/DL (ref 12–16)
IMM GRANULOCYTES # BLD AUTO: 0.04 X10(3)/MCL (ref 0–0.04)
IMM GRANULOCYTES NFR BLD AUTO: 0.4 %
LYMPHOCYTES # BLD AUTO: 0.31 X10(3)/MCL (ref 0.6–4.6)
LYMPHOCYTES NFR BLD AUTO: 3.2 %
MAGNESIUM SERPL-MCNC: 2 MG/DL (ref 1.6–2.6)
MCH RBC QN AUTO: 25.3 PG (ref 27–31)
MCHC RBC AUTO-ENTMCNC: 30.1 G/DL (ref 33–36)
MCV RBC AUTO: 83.9 FL (ref 80–94)
MONOCYTES # BLD AUTO: 0.55 X10(3)/MCL (ref 0.1–1.3)
MONOCYTES NFR BLD AUTO: 5.6 %
NEUTROPHILS # BLD AUTO: 8.85 X10(3)/MCL (ref 2.1–9.2)
NEUTROPHILS NFR BLD AUTO: 90.7 %
PLATELET # BLD AUTO: 191 X10(3)/MCL (ref 130–400)
PMV BLD AUTO: 11.6 FL (ref 7.4–10.4)
POCT GLUCOSE: 146 MG/DL (ref 70–110)
POCT GLUCOSE: 152 MG/DL (ref 70–110)
POTASSIUM SERPL-SCNC: 3.2 MMOL/L (ref 3.5–5.1)
RBC # BLD AUTO: 3.6 X10(6)/MCL (ref 4.2–5.4)
SODIUM SERPL-SCNC: 141 MMOL/L (ref 136–145)
WBC # BLD AUTO: 9.76 X10(3)/MCL (ref 4.5–11.5)

## 2024-08-04 PROCEDURE — 63600175 PHARM REV CODE 636 W HCPCS: Performed by: EMERGENCY MEDICINE

## 2024-08-04 PROCEDURE — 25000242 PHARM REV CODE 250 ALT 637 W/ HCPCS: Performed by: EMERGENCY MEDICINE

## 2024-08-04 PROCEDURE — 85025 COMPLETE CBC W/AUTO DIFF WBC: CPT | Performed by: EMERGENCY MEDICINE

## 2024-08-04 PROCEDURE — 36415 COLL VENOUS BLD VENIPUNCTURE: CPT | Performed by: EMERGENCY MEDICINE

## 2024-08-04 PROCEDURE — 36410 VNPNXR 3YR/> PHY/QHP DX/THER: CPT

## 2024-08-04 PROCEDURE — 27000221 HC OXYGEN, UP TO 24 HOURS

## 2024-08-04 PROCEDURE — 94640 AIRWAY INHALATION TREATMENT: CPT

## 2024-08-04 PROCEDURE — 93005 ELECTROCARDIOGRAM TRACING: CPT

## 2024-08-04 PROCEDURE — 80048 BASIC METABOLIC PNL TOTAL CA: CPT | Performed by: EMERGENCY MEDICINE

## 2024-08-04 PROCEDURE — 93010 ELECTROCARDIOGRAM REPORT: CPT | Mod: ,,, | Performed by: INTERNAL MEDICINE

## 2024-08-04 PROCEDURE — C1751 CATH, INF, PER/CENT/MIDLINE: HCPCS

## 2024-08-04 PROCEDURE — 11000001 HC ACUTE MED/SURG PRIVATE ROOM

## 2024-08-04 PROCEDURE — 21400001 HC TELEMETRY ROOM

## 2024-08-04 PROCEDURE — 94761 N-INVAS EAR/PLS OXIMETRY MLT: CPT

## 2024-08-04 PROCEDURE — 83735 ASSAY OF MAGNESIUM: CPT | Performed by: EMERGENCY MEDICINE

## 2024-08-04 PROCEDURE — 99900035 HC TECH TIME PER 15 MIN (STAT)

## 2024-08-04 PROCEDURE — 25000003 PHARM REV CODE 250: Performed by: EMERGENCY MEDICINE

## 2024-08-04 PROCEDURE — 27000207 HC ISOLATION

## 2024-08-04 RX ORDER — HYDROCORTISONE 5 MG/1
10 TABLET ORAL 2 TIMES DAILY
Status: DISCONTINUED | OUTPATIENT
Start: 2024-08-04 | End: 2024-08-09 | Stop reason: HOSPADM

## 2024-08-04 RX ORDER — DILTIAZEM HYDROCHLORIDE 5 MG/ML
20 INJECTION INTRAVENOUS ONCE
Status: COMPLETED | OUTPATIENT
Start: 2024-08-04 | End: 2024-08-04

## 2024-08-04 RX ORDER — ENOXAPARIN SODIUM 100 MG/ML
1 INJECTION SUBCUTANEOUS EVERY 12 HOURS
Status: DISCONTINUED | OUTPATIENT
Start: 2024-08-04 | End: 2024-08-05

## 2024-08-04 RX ORDER — SODIUM CHLORIDE 0.9 % (FLUSH) 0.9 %
10 SYRINGE (ML) INJECTION
Status: DISCONTINUED | OUTPATIENT
Start: 2024-08-04 | End: 2024-08-09 | Stop reason: HOSPADM

## 2024-08-04 RX ORDER — POTASSIUM CHLORIDE 7.45 MG/ML
10 INJECTION INTRAVENOUS
Status: COMPLETED | OUTPATIENT
Start: 2024-08-04 | End: 2024-08-04

## 2024-08-04 RX ORDER — SODIUM CHLORIDE 0.9 % (FLUSH) 0.9 %
10 SYRINGE (ML) INJECTION EVERY 6 HOURS
Status: DISCONTINUED | OUTPATIENT
Start: 2024-08-05 | End: 2024-08-09 | Stop reason: HOSPADM

## 2024-08-04 RX ADMIN — POTASSIUM CHLORIDE 10 MEQ: 7.46 INJECTION, SOLUTION INTRAVENOUS at 09:08

## 2024-08-04 RX ADMIN — HYDROCORTISONE SODIUM SUCCINATE 50 MG: 100 INJECTION, POWDER, FOR SOLUTION INTRAMUSCULAR; INTRAVENOUS at 06:08

## 2024-08-04 RX ADMIN — SODIUM CHLORIDE, POTASSIUM CHLORIDE, SODIUM LACTATE AND CALCIUM CHLORIDE: 600; 310; 30; 20 INJECTION, SOLUTION INTRAVENOUS at 12:08

## 2024-08-04 RX ADMIN — HYDROCORTISONE 10 MG: 5 TABLET ORAL at 09:08

## 2024-08-04 RX ADMIN — SODIUM CHLORIDE, POTASSIUM CHLORIDE, SODIUM LACTATE AND CALCIUM CHLORIDE: 600; 310; 30; 20 INJECTION, SOLUTION INTRAVENOUS at 10:08

## 2024-08-04 RX ADMIN — CITALOPRAM HYDROBROMIDE 30 MG: 20 TABLET ORAL at 08:08

## 2024-08-04 RX ADMIN — DEXTROSE MONOHYDRATE 10 MG/HR: 50 INJECTION, SOLUTION INTRAVENOUS at 11:08

## 2024-08-04 RX ADMIN — FAMOTIDINE 20 MG: 20 TABLET ORAL at 08:08

## 2024-08-04 RX ADMIN — MEMANTINE HYDROCHLORIDE 10 MG: 5 TABLET ORAL at 09:08

## 2024-08-04 RX ADMIN — MUPIROCIN 1 G: 20 OINTMENT TOPICAL at 08:08

## 2024-08-04 RX ADMIN — HYDROCORTISONE 10 MG: 5 TABLET ORAL at 12:08

## 2024-08-04 RX ADMIN — POTASSIUM CHLORIDE 10 MEQ: 7.46 INJECTION, SOLUTION INTRAVENOUS at 08:08

## 2024-08-04 RX ADMIN — PIPERACILLIN AND TAZOBACTAM 4.5 G: 4; .5 INJECTION, POWDER, LYOPHILIZED, FOR SOLUTION INTRAVENOUS; PARENTERAL at 06:08

## 2024-08-04 RX ADMIN — DILTIAZEM HYDROCHLORIDE 20 MG: 5 INJECTION INTRAVENOUS at 02:08

## 2024-08-04 RX ADMIN — IPRATROPIUM BROMIDE AND ALBUTEROL SULFATE 3 ML: .5; 3 SOLUTION RESPIRATORY (INHALATION) at 07:08

## 2024-08-04 RX ADMIN — DEXTROSE MONOHYDRATE 10 MG/HR: 50 INJECTION, SOLUTION INTRAVENOUS at 03:08

## 2024-08-04 RX ADMIN — IPRATROPIUM BROMIDE AND ALBUTEROL SULFATE 3 ML: .5; 3 SOLUTION RESPIRATORY (INHALATION) at 12:08

## 2024-08-04 RX ADMIN — MEMANTINE HYDROCHLORIDE 10 MG: 5 TABLET ORAL at 08:08

## 2024-08-04 RX ADMIN — INSULIN ASPART 2 UNITS: 100 INJECTION, SOLUTION INTRAVENOUS; SUBCUTANEOUS at 11:08

## 2024-08-04 RX ADMIN — SODIUM CHLORIDE, POTASSIUM CHLORIDE, SODIUM LACTATE AND CALCIUM CHLORIDE: 600; 310; 30; 20 INJECTION, SOLUTION INTRAVENOUS at 09:08

## 2024-08-04 RX ADMIN — IPRATROPIUM BROMIDE AND ALBUTEROL SULFATE 3 ML: .5; 3 SOLUTION RESPIRATORY (INHALATION) at 01:08

## 2024-08-04 RX ADMIN — ENOXAPARIN SODIUM 100 MG: 100 INJECTION SUBCUTANEOUS at 09:08

## 2024-08-04 RX ADMIN — IPRATROPIUM BROMIDE AND ALBUTEROL SULFATE 3 ML: .5; 3 SOLUTION RESPIRATORY (INHALATION) at 08:08

## 2024-08-04 RX ADMIN — MUPIROCIN 1 G: 20 OINTMENT TOPICAL at 09:08

## 2024-08-04 RX ADMIN — PIPERACILLIN AND TAZOBACTAM 4.5 G: 4; .5 INJECTION, POWDER, LYOPHILIZED, FOR SOLUTION INTRAVENOUS; PARENTERAL at 03:08

## 2024-08-04 RX ADMIN — DONEPEZIL HYDROCHLORIDE 10 MG: 5 TABLET, FILM COATED ORAL at 09:08

## 2024-08-04 RX ADMIN — DONEPEZIL HYDROCHLORIDE 10 MG: 5 TABLET, FILM COATED ORAL at 08:08

## 2024-08-04 NOTE — PROCEDURES
"Thu Branch is a 73 y.o. female patient.    Temp: 98.4 °F (36.9 °C) (08/04/24 1608)  Pulse: 103 (08/04/24 1721)  Resp: (!) 22 (08/04/24 1254)  BP: (!) 103/47 (08/04/24 1721)  SpO2: (!) 89 % (08/04/24 1604)  Weight: 96.5 kg (212 lb 11.9 oz) (08/02/24 1432)  Height: 5' 2" (157.5 cm) (08/02/24 1432)    PICC  Date/Time: 8/4/2024 6:32 PM  Performed by: Radha Dela Cruz RN  Consent Done: Yes  Time out: Immediately prior to procedure a time out was called to verify the correct patient, procedure, equipment, support staff and site/side marked as required  Indications: med administration and vascular access  Anesthesia: local infiltration  Local anesthetic: lidocaine 1% without epinephrine  Anesthetic Total (mL): 4  Preparation: skin prepped with ChloraPrep  Skin prep agent dried: skin prep agent completely dried prior to procedure  Sterile barriers: all five maximum sterile barriers used - cap, mask, sterile gown, sterile gloves, and large sterile sheet  Hand hygiene: hand hygiene performed prior to central venous catheter insertion  Location details: left cephalic  Catheter type: single lumen  Catheter size: 4 Fr  Catheter Length: 14cm    Ultrasound guidance: yes  Vessel Caliber: medium and patent, compressibility normal  Needle advanced into vessel with real time Ultrasound guidance.  Guidewire confirmed in vessel.  Sterile sheath used.  Number of attempts: 1  Post-procedure: blood return through all ports, sterile dressing applied and chlorhexidine patch    Complications: none          Radha Dela Cruz RN  8/4/2024    "

## 2024-08-04 NOTE — PROGRESS NOTES
[facility] Medicine  Progress Note    Patient Name: Thu Branch  MRN: [id number]  Patient Class: IP- Inpatient   Admission Date: 8/2/2024  Length of Stay: 2 days  Attending Physician: Ernesto Mehta MD  Primary Care Provider: Joseph Vides MD        Subjective:     Principal Problem:Acute kidney injury    Interval History:   HPI: Ms. Branch is a 73-year-old female, NH patient was transferred to the ED earlier today due to staff noting change in her mental status as well as hypoxia as noted on room air saturations.  Her reported room air saturation there was 88%.  In the ED while on RA O2 saturation noted to fall into the mid 80s.  With 2 L NC O2 saturations improved into the mid-to-high 90s.  Evaluation included CT head which was unremarkable for acute changes, CXR was thought to show RLL infiltrate and routine lab work which revealed leukocytosis and elevated renal indices consistent with GADIEL.  While in the ED was also noted to have 2 episodes of diarrhea following which a rectal tube was inserted.  Urinalysis was ordered but not collected in the ED. urinary catheter was placed following the patient's arrival to the /surge unit.  On my seeing her was awake and answered questions but did not initiate conversation.  Denied feeling short of breath, denied pain.  She did not know why she was sent to the emergency department.    8/3-when seen on rounds today appeared comfortable; she was very sleepy, awakened to light touch and would answer questions with 1 or 2 word answers but quickly fall back to sleep when not stimulated; continues to have watery stools    8/4-she was much more alert and interactive when seen on rounds today; mental status is back to her baseline; she had no complaints      Objective:     Vital Signs (Most Recent):  Temp: 98.2 °F (36.8 °C) (08/04/24 0743)  Pulse: 67 (08/04/24 0743)  Resp: 20 (08/04/24 0708)  BP: (!) 128/92  (08/04/24 0743)  SpO2: 100 % (08/04/24 0900) Vital Signs (24h Range):  Temp:  [96.4 °F (35.8 °C)-98.5 °F (36.9 °C)] 98.2 °F (36.8 °C)  Pulse:  [56-67] 67  Resp:  [16-22] 20  SpO2:  [94 %-100 %] 100 %  BP: ()/(60-95) 128/92     Weight: 96.5 kg (212 lb 11.9 oz)  Body mass index is 38.91 kg/m².    Intake/Output Summary (Last 24 hours) at 8/4/2024 1103  Last data filed at 8/4/2024 0800  Gross per 24 hour   Intake 1945.18 ml   Output 1450 ml   Net 495.18 ml      Physical exam  Constitution-obese, normally developed female in NAD  Eyes-PERRL, EOMI; gaze is much more directed today  HENT-normocephalic, atraumatic  Neck-supple  Respiratory-normal respirations; CTA with good air movement  Heart-RRR; normal S1 and S2; no murmurs, gallops  Abdomen-soft, nontender, nondistended  Genitourinary-deferred  Musculoskeletal-no joint abnormalities, normal ROM throughout  Skin-warm, dry; no rashes  Neurologic-alert, oriented x3; she is essentially functionally quadriplegic    Scheduled Meds:   albuterol-ipratropium  3 mL Nebulization Q6H    citalopram  30 mg Oral Daily    donepeziL  10 mg Oral BID    enoxparin  30 mg Subcutaneous Daily    famotidine  20 mg Oral Daily    hydrocortisone sodium succinate  50 mg Intravenous Q8H    memantine  10 mg Oral BID    mupirocin   Nasal BID    piperacillin-tazobactam (Zosyn) IV (PEDS and ADULTS) (extended infusion is not appropriate)  4.5 g Intravenous Q12H     Continuous Infusions:   lactated ringers   Intravenous Continuous 100 mL/hr at 08/04/24 0913 New Bag at 08/04/24 0913     PRN Meds:.  Current Facility-Administered Medications:     acetaminophen, 650 mg, Oral, Q4H PRN    acetaminophen, 650 mg, Oral, Q8H PRN    dextrose 10%, 12.5 g, Intravenous, PRN    dextrose 10%, 25 g, Intravenous, PRN    glucagon (human recombinant), 1 mg, Intramuscular, PRN    glucose, 16 g, Oral, PRN    glucose, 24 g, Oral, PRN    insulin aspart U-100, 0-10 Units, Subcutaneous, QID (AC + HS) PRN    naloxone, 0.02  "mg, Intravenous, PRN    sodium chloride 0.9%, 10 mL, Intravenous, Q12H PRN    Significant Labs: All pertinent labs within the past 24 hours have been reviewed.  Blood Culture: No results for input(s): "LABBLOO" in the last 48 hours.  CBC:   Recent Labs   Lab 08/03/24  0328 08/04/24  0340   WBC 12.99* 9.76   HGB 9.0* 9.1*   HCT 30.8* 30.2*    191     CMP:   Recent Labs   Lab 08/03/24  0328 08/04/24  0340    141   K 3.9 3.2*   * 111*   CO2 20* 19*   BUN 47.0* 41.0*   CREATININE 1.91* 1.16*   CALCIUM 10.0 9.9   ALBUMIN 2.5*  --    BILITOT 0.4  --    ALKPHOS 54  --    AST 25  --    ALT 23  --      Magnesium:   Recent Labs   Lab 08/03/24 0328 08/04/24  0340   MG 2.10 2.00     POCT Glucose:   Recent Labs   Lab 08/03/24  1721 08/03/24  2054 08/04/24  0530   POCTGLUCOSE 157* 152* 146*     Urine Culture:   Recent Labs   Lab 08/02/24  1451   LABURIN >/= 100,000 colonies/ml Gram-negative Rods*     Urine Studies:   Recent Labs   Lab 08/02/24  1451   COLORU Brown*   APPEARANCEUA Cloudy*   PHUR 6.0   PROTEINUA 3+*   GLUCUA Negative   BILIRUBINUA 1+*   OCCULTUA 3+*   NITRITE Negative   UROBILINOGEN 0.2   LEUKOCYTESUR 3+*   RBCUA 11-20*   WBCUA >100*   BACTERIA Many*       Significant Imaging:   CXR  Impression:  1. Mild cardiomegaly  2. Atherosclerosis  3. Atelectasis and/or scarring left lower lung    Assessment/Plan:   Metabolic encephalopathy  Multifactorial; patient has a UTI, possible pneumonia, volume depletion and GADIEL all of which contributed to altered mental status  Resolved; mental status at baseline     Acute kidney injury/CKD, stage IIIB  Likely due to volume depletion; exacerbated by ARB  Continue indwelling urinary catheter  Avoid nephrotoxic agents/renal dose medications  Renal indices improving  Continue IV hydration     UTI  Urine culture-GNR's  Continue IV Zosyn pending culture results    Gram-negative (Klebsiella) sepsis  BC's 2/2 growing ESBL+ Klebsiella pneumoniae, sensitivities " pending  WBC now nl  Continue IV Zosyn     Possible RLL pneumonia/hypoxemia  Bronchodilator therapy, supplemental O2 as needed as patient hypoxic on room air  O2 saturation on 3 L %  Continue current antibiotics  Procalcitonin 25.5, markedly elevated     Diarrhea  Etiology: Infectious versus iatrogenic (patient is on multiple laxative type medications as well as Linzess); these were discontinued  C diff studies neg  GI panel negative  Continue rectal tube until stools become formed     Diabetes mellitus  Monitor blood sugars with Accu-Cheks and SSI  Add basal insulin as necessary     Hypertension  Blood pressure rising  Resume patient's amlodipine 5 mg daily; continue to hold ARB    Hypokalemia  Supplement as necessary     Patient on chronic hydrocortisone  Transition to oral hydrocortisone    Extreme debility  Patient is bed ridden, requires total care, functionally quadriplegic  Continue clear liquids  Obtain swallowing evaluation     GI prophylaxis: Famotidine     Code status: DNR   Active Diagnoses:    Diagnosis Date Noted POA    PRINCIPAL PROBLEM:  Acute kidney injury [N17.9] 05/12/2024 Yes      Problems Resolved During this Admission:     VTE Risk Mitigation (From admission, onward)           Ordered     enoxaparin injection 30 mg  Daily         08/02/24 1456     IP VTE HIGH RISK PATIENT  Once         08/02/24 1456                       Ernesto Mehta MD  Department of Hospital Medicine   [facility]

## 2024-08-04 NOTE — CONSULTS
Inpatient Nutrition Evaluation    Admit Date: 8/2/2024   Total duration of encounter: 2 days    Nutrition Recommendation/Prescription     Recommend 1800 Calorie Diabetic Diet when able to advance diet beyond CL.   Can add Boost Breeze while on CL; however, Boost Breeze is high in carbohydrates Boost Breeze (provides 250 kcal, 9 g protein per; 54 g carbohydrates per serving). Would recommend adding Boost Glucose Control, TID, when able to advance diet beyond CL. Boost Glucose Control (provides 190 kcal, 16 g protein, 23 g carbohydrates per serving)  SLP evaluation for texture modifications if need per MD order.   May consider adding Banatrol Plus to assist with diarrhea/loose stools.   Monitor Renal indices and the need for adding Renal restriction to current diet order. Renal indices improving.   Monitor need for ONS.   Monitor diet advancement, intake, weight, and labs.     RD following and available as needed. Thank you.     Nutrition Assessment     Chart Review    Reason Seen: continuous nutrition monitoring and malnutrition screening tool (MST) Nursing Consult for poor appetite.     Malnutrition Screening Tool Results   Have you recently lost weight without trying?: Unsure  Have you been eating poorly because of a decreased appetite?: Yes   MST Score: 3     Diagnosis:  Acute Kidney Injury.     Relevant Medical History:   Coronary artery disease      Depression      Hypertension      Stroke          Nutrition-Related Medications:   IV Fluids: 0.9% NaCl@100mL/hr.   Donepezil; Lovenox; Insulin; Memantine    Nutrition-Related Labs:  8/4: H/H 9.1/30.2(L); K+ 3.2(L); Cl 111(H); CO2 19(L); BUN/Crea 41.0/1/16(H); GFR 51(L); (H).  8/3: WBC 12.99(H); H/H 9.0/30.8(L); BUN/Crea 47.0/1.91(H); Cl 111(H); CO2 20(L); (H); Alb 2.5(L)  6/29: A1C 7.1(H)    Diet Order: Diet Clear Liquid  Oral Supplement Order: none  Appetite/Oral Intake: not applicable/not applicable No recorded intake yet.   Factors Affecting  "Nutritional Intake: decreased appetite, diarrhea, and Altered Mental Status.   Food/Hindu/Cultural Preferences: none reported  Food Allergies: none reported    Skin Integrity: bruised (ecchymotic)  Wound(s):       Comments    8/4: Received nursing consult for poor appetite. Saw pt yesterday. Renal indices improving. Noted pt with no complaints today and mental status improving today which should help with appetite improvement. Can add Boost Breeze while on CL; however, Boost Breeze is high in carbohydrates Boost Breeze (provides 250 kcal, 9 g protein per; 54 g carbohydrates per serving). Would recommend adding Boost Glucose Control, TID, when able to advance diet beyond CL. Boost Glucose Control (provides 190 kcal, 16 g protein, 23 g carbohydrates per serving). Will continue to monitor during stay.       8/3: Pt arrived from the nursing home with altered mental status and diarrhea. Appetite loss noted on nursing admit screen; however, unsure of time frame. No recent weight loss noted/reported. Pt currently with rectal tube secondary to diarrhea. C-Diff came back negative. Noted pt on several medications that could be causing diarrhea. MD addressing.  Pt currently on CL diet. No recorded intake yet. Will monitor. Pt may benefit from adding Banatrol Plus to assist with diarrhea/loose stools. Labs and meds reviewed. Renal indices concerning. May consider adding renal restriction to current diet order. A1C and GLU elevated. Recommend Diabetic Diet when able to advance diet beyond CL. Will continue to monitor during stay.    Anthropometrics    Height: 5' 2" (157.5 cm)    Last Weight: 96.5 kg (212 lb 11.9 oz) (08/02/24 1432) Weight Method: Bed Scale  BMI (Calculated): 38.9  BMI Classification: obese grade II (BMI 35-39.9)     Ideal Body Weight (IBW), Female: 110 lb     % Ideal Body Weight, Female (lb): 193.41 %                             Usual Weight Provided By: unable to obtain usual weight    Wt Readings from Last " 5 Encounters:   08/02/24 96.5 kg (212 lb 11.9 oz)   05/14/24 92.2 kg (203 lb 4.2 oz)   07/24/23 90.7 kg (200 lb)   06/30/23 113.2 kg (249 lb 9 oz)   10/11/22 87.2 kg (192 lb 3.9 oz)     Weight Change(s) Since Admission:  Admit Weight: 96.2 kg (212 lb) (08/02/24 1014)      Patient Education    Not applicable.    Monitoring & Evaluation     Dietitian will monitor food and beverage intake, weight, weight change, electrolyte/renal panel, glucose/endocrine profile, and gastrointestinal profile.  Nutrition Risk/Follow-Up: low (follow-up in 5-7 days)  Patients assigned 'low nutrition risk' status do not qualify for a full nutritional assessment but will be monitored and re-evaluated in a 5-7 day time period. Please consult if re-evaluation needed sooner.

## 2024-08-05 LAB
ANION GAP SERPL CALC-SCNC: 8 MEQ/L
BACTERIA BLD CULT: ABNORMAL
BACTERIA UR CULT: ABNORMAL
BUN SERPL-MCNC: 25 MG/DL (ref 9.8–20.1)
CALCIUM SERPL-MCNC: 9.4 MG/DL (ref 8.4–10.2)
CHLORIDE SERPL-SCNC: 113 MMOL/L (ref 98–107)
CO2 SERPL-SCNC: 21 MMOL/L (ref 23–31)
CREAT SERPL-MCNC: 0.87 MG/DL (ref 0.55–1.02)
CREAT/UREA NIT SERPL: 29
GFR SERPLBLD CREATININE-BSD FMLA CKD-EPI: >60 ML/MIN/1.73/M2
GLUCOSE SERPL-MCNC: 137 MG/DL (ref 82–115)
GRAM STN SPEC: ABNORMAL
MAGNESIUM SERPL-MCNC: 1.8 MG/DL (ref 1.6–2.6)
OHS QRS DURATION: 82 MS
OHS QTC CALCULATION: 348 MS
POCT GLUCOSE: 129 MG/DL (ref 70–110)
POCT GLUCOSE: 134 MG/DL (ref 70–110)
POCT GLUCOSE: 158 MG/DL (ref 70–110)
POCT GLUCOSE: 163 MG/DL (ref 70–110)
POCT GLUCOSE: 193 MG/DL (ref 70–110)
POTASSIUM SERPL-SCNC: 2.7 MMOL/L (ref 3.5–5.1)
SODIUM SERPL-SCNC: 142 MMOL/L (ref 136–145)

## 2024-08-05 PROCEDURE — 27000207 HC ISOLATION

## 2024-08-05 PROCEDURE — 25000003 PHARM REV CODE 250: Performed by: EMERGENCY MEDICINE

## 2024-08-05 PROCEDURE — 83735 ASSAY OF MAGNESIUM: CPT | Performed by: EMERGENCY MEDICINE

## 2024-08-05 PROCEDURE — 25000242 PHARM REV CODE 250 ALT 637 W/ HCPCS: Performed by: EMERGENCY MEDICINE

## 2024-08-05 PROCEDURE — 27000221 HC OXYGEN, UP TO 24 HOURS

## 2024-08-05 PROCEDURE — 99900035 HC TECH TIME PER 15 MIN (STAT)

## 2024-08-05 PROCEDURE — 94761 N-INVAS EAR/PLS OXIMETRY MLT: CPT

## 2024-08-05 PROCEDURE — 94640 AIRWAY INHALATION TREATMENT: CPT

## 2024-08-05 PROCEDURE — 21400001 HC TELEMETRY ROOM

## 2024-08-05 PROCEDURE — 92610 EVALUATE SWALLOWING FUNCTION: CPT

## 2024-08-05 PROCEDURE — 80048 BASIC METABOLIC PNL TOTAL CA: CPT | Performed by: EMERGENCY MEDICINE

## 2024-08-05 PROCEDURE — 63600175 PHARM REV CODE 636 W HCPCS: Performed by: EMERGENCY MEDICINE

## 2024-08-05 PROCEDURE — 36415 COLL VENOUS BLD VENIPUNCTURE: CPT | Performed by: EMERGENCY MEDICINE

## 2024-08-05 RX ORDER — DILTIAZEM HYDROCHLORIDE 60 MG/1
60 TABLET, FILM COATED ORAL EVERY 8 HOURS
Status: DISCONTINUED | OUTPATIENT
Start: 2024-08-05 | End: 2024-08-09 | Stop reason: HOSPADM

## 2024-08-05 RX ORDER — CHOLESTYRAMINE 4 G/9G
1 POWDER, FOR SUSPENSION ORAL 2 TIMES DAILY
Status: DISCONTINUED | OUTPATIENT
Start: 2024-08-05 | End: 2024-08-09 | Stop reason: HOSPADM

## 2024-08-05 RX ORDER — LEVOFLOXACIN 500 MG/1
500 TABLET, FILM COATED ORAL DAILY
Status: DISCONTINUED | OUTPATIENT
Start: 2024-08-05 | End: 2024-08-05

## 2024-08-05 RX ORDER — POTASSIUM CHLORIDE 7.45 MG/ML
10 INJECTION INTRAVENOUS
Status: COMPLETED | OUTPATIENT
Start: 2024-08-05 | End: 2024-08-05

## 2024-08-05 RX ORDER — MAGNESIUM SULFATE HEPTAHYDRATE 40 MG/ML
2 INJECTION, SOLUTION INTRAVENOUS ONCE
Status: COMPLETED | OUTPATIENT
Start: 2024-08-05 | End: 2024-08-05

## 2024-08-05 RX ORDER — IPRATROPIUM BROMIDE AND ALBUTEROL SULFATE 2.5; .5 MG/3ML; MG/3ML
3 SOLUTION RESPIRATORY (INHALATION) EVERY 6 HOURS
Status: DISCONTINUED | OUTPATIENT
Start: 2024-08-05 | End: 2024-08-09 | Stop reason: HOSPADM

## 2024-08-05 RX ORDER — POTASSIUM CHLORIDE 7.45 MG/ML
10 INJECTION INTRAVENOUS
Status: DISPENSED | OUTPATIENT
Start: 2024-08-05 | End: 2024-08-05

## 2024-08-05 RX ORDER — LOPERAMIDE HYDROCHLORIDE 2 MG/1
2 CAPSULE ORAL ONCE
Status: COMPLETED | OUTPATIENT
Start: 2024-08-05 | End: 2024-08-05

## 2024-08-05 RX ADMIN — DILTIAZEM HYDROCHLORIDE 60 MG: 60 TABLET, FILM COATED ORAL at 01:08

## 2024-08-05 RX ADMIN — MUPIROCIN 1 G: 20 OINTMENT TOPICAL at 10:08

## 2024-08-05 RX ADMIN — POTASSIUM CHLORIDE 10 MEQ: 7.46 INJECTION, SOLUTION INTRAVENOUS at 12:08

## 2024-08-05 RX ADMIN — POTASSIUM CHLORIDE 10 MEQ: 7.46 INJECTION, SOLUTION INTRAVENOUS at 01:08

## 2024-08-05 RX ADMIN — POTASSIUM CHLORIDE 10 MEQ: 7.46 INJECTION, SOLUTION INTRAVENOUS at 02:08

## 2024-08-05 RX ADMIN — IPRATROPIUM BROMIDE AND ALBUTEROL SULFATE 3 ML: .5; 3 SOLUTION RESPIRATORY (INHALATION) at 07:08

## 2024-08-05 RX ADMIN — IPRATROPIUM BROMIDE AND ALBUTEROL SULFATE 3 ML: .5; 3 SOLUTION RESPIRATORY (INHALATION) at 01:08

## 2024-08-05 RX ADMIN — MEMANTINE HYDROCHLORIDE 10 MG: 5 TABLET ORAL at 09:08

## 2024-08-05 RX ADMIN — LOPERAMIDE HYDROCHLORIDE 2 MG: 2 CAPSULE ORAL at 04:08

## 2024-08-05 RX ADMIN — CHOLESTYRAMINE POWDER FOR SUSPENSION 4 G: 4 POWDER, FOR SUSPENSION ORAL at 10:08

## 2024-08-05 RX ADMIN — DILTIAZEM HYDROCHLORIDE 60 MG: 60 TABLET, FILM COATED ORAL at 10:08

## 2024-08-05 RX ADMIN — POTASSIUM CHLORIDE 10 MEQ: 7.46 INJECTION, SOLUTION INTRAVENOUS at 03:08

## 2024-08-05 RX ADMIN — MEMANTINE HYDROCHLORIDE 10 MG: 5 TABLET ORAL at 10:08

## 2024-08-05 RX ADMIN — DILTIAZEM HYDROCHLORIDE 60 MG: 60 TABLET, FILM COATED ORAL at 09:08

## 2024-08-05 RX ADMIN — LEVOFLOXACIN 500 MG: 500 TABLET, FILM COATED ORAL at 09:08

## 2024-08-05 RX ADMIN — PIPERACILLIN AND TAZOBACTAM 4.5 G: 4; .5 INJECTION, POWDER, LYOPHILIZED, FOR SOLUTION INTRAVENOUS; PARENTERAL at 04:08

## 2024-08-05 RX ADMIN — HYDROCORTISONE 10 MG: 5 TABLET ORAL at 10:08

## 2024-08-05 RX ADMIN — HYDROCORTISONE 10 MG: 5 TABLET ORAL at 09:08

## 2024-08-05 RX ADMIN — FAMOTIDINE 20 MG: 20 TABLET ORAL at 09:08

## 2024-08-05 RX ADMIN — MAGNESIUM SULFATE IN WATER 2 G: 40 INJECTION, SOLUTION INTRAVENOUS at 09:08

## 2024-08-05 RX ADMIN — DONEPEZIL HYDROCHLORIDE 10 MG: 5 TABLET, FILM COATED ORAL at 10:08

## 2024-08-05 RX ADMIN — DONEPEZIL HYDROCHLORIDE 10 MG: 5 TABLET, FILM COATED ORAL at 09:08

## 2024-08-05 RX ADMIN — CITALOPRAM HYDROBROMIDE 30 MG: 20 TABLET ORAL at 09:08

## 2024-08-05 RX ADMIN — INSULIN ASPART 2 UNITS: 100 INJECTION, SOLUTION INTRAVENOUS; SUBCUTANEOUS at 12:08

## 2024-08-05 RX ADMIN — APIXABAN 5 MG: 5 TABLET, FILM COATED ORAL at 09:08

## 2024-08-05 RX ADMIN — APIXABAN 5 MG: 5 TABLET, FILM COATED ORAL at 10:08

## 2024-08-05 RX ADMIN — MUPIROCIN 1 G: 20 OINTMENT TOPICAL at 09:08

## 2024-08-05 RX ADMIN — SODIUM CHLORIDE, POTASSIUM CHLORIDE, SODIUM LACTATE AND CALCIUM CHLORIDE: 600; 310; 30; 20 INJECTION, SOLUTION INTRAVENOUS at 12:08

## 2024-08-05 RX ADMIN — ERTAPENEM 1 G: 1 INJECTION INTRAMUSCULAR; INTRAVENOUS at 04:08

## 2024-08-05 NOTE — PT/OT/SLP EVAL
[facility]  Speech Therapy Clinical Swallow Exam    Name: Thu Branch    : 1950 (73 y.o.)  MRN: [id number]  Chief Complaint:   Chief Complaint   Patient presents with    Altered Mental Status     Sent from [facility] for low O2 sat of 88% RA, AMS and lethargic       Past Medical History:   Diagnosis Date    Coronary artery disease     Depression     Hypertension     Stroke        Past Surgical History:   Procedure Laterality Date    CORONARY ARTERY BYPASS GRAFT             Subjective:       Pt REPORT / INTERVIEW: Pt alert but drowsy. +Confusion. Denies any Dysphagia.   ORIENTATION: x 1  AFFECT: Appropriate  GENERAL COGNITIVE IMPRESSION: Suspect impairment. Recommend further Cognitive Testing.     Objective:       ORAL MOTOR EXAM:  Oral Musculature: WFL  Dentition: present and adequate    PO TRIALS:   Patient presented with:   Ice Chips (IDDSI 0): WFL for oral phase. Swallow response was timely. No clinical s/s of aspiration.    Thin (IDDSI 0): Via straw. WFL for oral phase. Cough noted with 1 with sequential sips. Symptoms subsided when Pt was instructed to take single sips. Required repeated cues secondary to confusion.   Puree (IDDSI 4): Pt politely declined    Soft and Bite Sized (IDDSI 6): Diced pears. Extended bolus prep/mastication. Complete oral clearance. Timely swallow. No clinical s/s of aspiration.       *Thicken liquids were not used in this assessment. Abiel (2018) reported that thickened liquids have no sound evidence as reducing risk of pneumonia in patients with dysphagia and can cause harm by increasing risk of dehydration. It also presents an increased risk of UTI, electrolyte imbalance, constipation, fecal impaction, cognitive impairment, functional decline, and even death (Macomore, 2002; Linda, 2016).  This supports the assertion that we should confirm a patient requires thickened liquids with an instrumental swallow study prior to recommending  them.    Limitations: Confusion and lethargy.     Assessment :     INTERPRETATION:   Some degree of dysphagia is suspected, likely transient in light of recent admission/ confusion. SLP recommends to advance diet to soft and bite size with thin liquids. Pt would greatly benefit from assistance with feeding secondary to AMS and aspiration risk. She was also benefit from single sips of liquids. SLP will monitor closely with additional recs as appropriate.         Continue to monitor for signs and symptoms of aspiration and discontinue oral feeding should you notice any of the following: watery eyes, reddened facial area, wet vocal quality, increased work of breathing, change in respiratory status, increased congestion, coughing, fever and/or change in level of alertness.       Plan:     Multidisciplinary Problems       SLP Goals          Problem: SLP    Goal Priority Disciplines Outcome   SLP Goal     SLP Progressing   Description: LTG:  -Pt will maintain adequate hydration/nutrition with optimum safety and efficiency of swallowing function on P.O. intake without overt signs and symptoms of aspiration for the highest appropriate diet level.    -Pt will utilize compensatory strategies with optimum safety and efficiency of swallowing function on P.O. intake without overt signs and symptoms of aspiration for the highest appropriate diet level.        STG:  -Pt will complete a Modified Barium Swallow Study to fully assess physiology and anatomy of the swallow and to determine the appropriate diet and/or rehabilitation exercises/POC.    -Pt will tolerate diet upgrade trials without signs and/or symptoms of aspiration with to safely least restrictive diet with (min/mod/max) verbal, visual and tactile cues.      -Pt will safely ingest diet trials during therapeutic feedings with the SLP without signs and/or symptoms of aspiration with to safely consume least restrictive diet with (min/mod/max) verbal, visual and tactile  cues.                         Current Diet Level Recommendations: Soft and Bite Size (IDDSI Level 6) with thin liquids (IDDSI Level 0)  Aspiration Precautions: Assist with meals, upright and alert, single straw sips, small bites, Cyclic ingestion, check oral cavity for pocketing, stringent oral care.     Patient to be seen:   (3-5 x/week)   Plan of Care expires:  08/12/24  Plan of Care reviewed with:  patient   SLP Follow-Up:  Yes       Discharge recommendations:  To Be Determined  Barriers to Discharge:        Billable minutes:  Swallow and Oral Function Evaluation, 30 minutes   Start Time: 1130  End Time: 1200    Bhargavi Talavera MS, CCC-SLP  08/05/2024

## 2024-08-05 NOTE — PLAN OF CARE
Problem: Adult Inpatient Plan of Care  Goal: Plan of Care Review  Outcome: Progressing  Goal: Patient-Specific Goal (Individualized)  Outcome: Progressing  Goal: Absence of Hospital-Acquired Illness or Injury  Outcome: Progressing  Goal: Optimal Comfort and Wellbeing  Outcome: Progressing  Goal: Readiness for Transition of Care  Outcome: Progressing     Problem: Diabetes Comorbidity  Goal: Blood Glucose Level Within Targeted Range  Outcome: Progressing     Problem: Acute Kidney Injury/Impairment  Goal: Fluid and Electrolyte Balance  Outcome: Progressing  Goal: Improved Oral Intake  Outcome: Progressing  Goal: Effective Renal Function  Outcome: Progressing     Problem: Infection  Goal: Absence of Infection Signs and Symptoms  Outcome: Progressing     Problem: Fall Injury Risk  Goal: Absence of Fall and Fall-Related Injury  Outcome: Progressing     Problem: Skin Injury Risk Increased  Goal: Skin Health and Integrity  Outcome: Progressing     Problem: Comorbidity Management  Goal: Blood Pressure in Desired Range  Outcome: Progressing     Problem: UTI (Urinary Tract Infection)  Goal: Improved Infection Symptoms  Outcome: Progressing     Problem: Pneumonia  Goal: Fluid Balance  Outcome: Progressing  Goal: Resolution of Infection Signs and Symptoms  Outcome: Progressing  Goal: Effective Oxygenation and Ventilation  Outcome: Progressing     Problem: Gas Exchange Impaired  Goal: Optimal Gas Exchange  Outcome: Progressing     Problem: Functional Deficit  Goal: Improved Balance and Postural Control  Outcome: Progressing  Goal: Optimal Cognitive Function  Outcome: Progressing  Goal: Optimal Coordination  Outcome: Progressing  Goal: Improved Muscle Strength  Outcome: Progressing  Goal: Improved Muscle Tone  Outcome: Progressing  Goal: Optimal Range of Motion  Outcome: Progressing  Goal: Compensation for Sensory Deficit  Outcome: Progressing

## 2024-08-05 NOTE — PROGRESS NOTES
Inpatient Nutrition Evaluation    Admit Date: 8/2/2024   Total duration of encounter: 3 days    Nutrition Recommendation/Prescription     Recommend a Soft, 1800 Calorie Diabetic Diet. Bite-Sized with thin liquids, single sips per SLP recommendations.  Assist with meals secondary to AMS and aspiration risk.   Would recommend adding Boost Glucose Control, TID, if intake < 50% when able to advance diet beyond CL. Boost Glucose Control (provides 190 kcal, 16 g protein, 23 g carbohydrates per serving)  Monitor lytes and replete as needed.   May consider adding Banatrol Plus to assist with diarrhea/loose stools.   Monitor diet advancement, intake, weight, and labs.     RD following and available as needed. Thank you.     Nutrition Assessment     Chart Review    Reason Seen: continuous nutrition monitoring and follow-up.     Malnutrition Screening Tool Results   Have you recently lost weight without trying?: Unsure  Have you been eating poorly because of a decreased appetite?: Yes   MST Score: 3     Diagnosis:  Acute Kidney Injury.     Relevant Medical History:   Coronary artery disease      Depression      Hypertension      Stroke          Nutrition-Related Medications:   IV Fluids: 0.9% NaCl@100mL/hr.   Donepezil; Lovenox; Insulin; Memantine    Nutrition-Related Labs:  8/5: H/H 9.1/30.2(L); K+ 2.7(L); Cl 113(H); CO2 21(L); BUN 25(H); (H).  8/4: H/H 9.1/30.2(L); K+ 3.2(L); Cl 111(H); CO2 19(L); BUN/Crea 41.0/1/16(H); GFR 51(L); (H).  8/3: WBC 12.99(H); H/H 9.0/30.8(L); BUN/Crea 47.0/1.91(H); Cl 111(H); CO2 20(L); (H); Alb 2.5(L)  6/29: A1C 7.1(H)    Diet Order: Diet Heart Healthy Soft & Bite Sized (IDDSI Level 6)  Oral Supplement Order: none  Appetite/Oral Intake: not applicable/not applicable No recorded intake yet.   Factors Affecting Nutritional Intake: decreased appetite, diarrhea, and Altered Mental Status.   Food/Sabianist/Cultural Preferences: none reported  Food Allergies: none  "reported    Skin Integrity: bruised (ecchymotic), drain/device(s)  Wound(s):       Comments    8/5: SLP evaluated today and cleared pt for diet advancement, and recommends Soft, Bite-Sized with thin liquids, single sips, and assistance with meals. Discussed with nursing. Labs and meds reviewed. Renal indices improving, K+ low. MD addressing. Will continue to monitor during stay.     8/4: Received nursing consult for poor appetite. Saw pt yesterday. Renal indices improving. Noted pt with no complaints today and mental status improving today which should help with appetite improvement. Can add Boost Breeze while on CL; however, Boost Breeze is high in carbohydrates Boost Breeze (provides 250 kcal, 9 g protein per; 54 g carbohydrates per serving). Would recommend adding Boost Glucose Control, TID, when able to advance diet beyond CL. Boost Glucose Control (provides 190 kcal, 16 g protein, 23 g carbohydrates per serving). Will continue to monitor during stay.       8/3: Pt arrived from the nursing home with altered mental status and diarrhea. Appetite loss noted on nursing admit screen; however, unsure of time frame. No recent weight loss noted/reported. Pt currently with rectal tube secondary to diarrhea. C-Diff came back negative. Noted pt on several medications that could be causing diarrhea. MD addressing.  Pt currently on CL diet. No recorded intake yet. Will monitor. Pt may benefit from adding Banatrol Plus to assist with diarrhea/loose stools. Labs and meds reviewed. Renal indices concerning. May consider adding renal restriction to current diet order. A1C and GLU elevated. Recommend Diabetic Diet when able to advance diet beyond CL. Will continue to monitor during stay.    Anthropometrics    Height: 5' 2" (157.5 cm)    Last Weight: 96.5 kg (212 lb 11.9 oz) (08/02/24 1432) Weight Method: Bed Scale  BMI (Calculated): 38.9  BMI Classification: obese grade II (BMI 35-39.9)     Ideal Body Weight (IBW), Female: 110 " lb     % Ideal Body Weight, Female (lb): 193.41 %                             Usual Weight Provided By: unable to obtain usual weight    Wt Readings from Last 5 Encounters:   08/02/24 96.5 kg (212 lb 11.9 oz)   05/14/24 92.2 kg (203 lb 4.2 oz)   07/24/23 90.7 kg (200 lb)   06/30/23 113.2 kg (249 lb 9 oz)   10/11/22 87.2 kg (192 lb 3.9 oz)     Weight Change(s) Since Admission:  Admit Weight: 96.2 kg (212 lb) (08/02/24 1014)      Patient Education    Not applicable.    Monitoring & Evaluation     Dietitian will monitor food and beverage intake, weight, weight change, electrolyte/renal panel, glucose/endocrine profile, and gastrointestinal profile.  Nutrition Risk/Follow-Up: low (follow-up in 5-7 days)  Patients assigned 'low nutrition risk' status do not qualify for a full nutritional assessment but will be monitored and re-evaluated in a 5-7 day time period. Please consult if re-evaluation needed sooner.

## 2024-08-05 NOTE — PLAN OF CARE
Problem: Adult Inpatient Plan of Care  Goal: Plan of Care Review  Outcome: Progressing  Goal: Patient-Specific Goal (Individualized)  Outcome: Progressing  Goal: Absence of Hospital-Acquired Illness or Injury  Outcome: Progressing  Goal: Optimal Comfort and Wellbeing  Outcome: Progressing  Goal: Readiness for Transition of Care  Outcome: Progressing     Problem: Diabetes Comorbidity  Goal: Blood Glucose Level Within Targeted Range  Outcome: Progressing     Problem: Acute Kidney Injury/Impairment  Goal: Fluid and Electrolyte Balance  Outcome: Progressing  Goal: Improved Oral Intake  Outcome: Progressing  Goal: Effective Renal Function  Outcome: Progressing     Problem: Infection  Goal: Absence of Infection Signs and Symptoms  Outcome: Progressing     Problem: Fall Injury Risk  Goal: Absence of Fall and Fall-Related Injury  Outcome: Progressing     Problem: Skin Injury Risk Increased  Goal: Skin Health and Integrity  Outcome: Progressing     Problem: Comorbidity Management  Goal: Blood Pressure in Desired Range  Outcome: Progressing     Problem: Pneumonia  Goal: Fluid Balance  Outcome: Progressing  Goal: Resolution of Infection Signs and Symptoms  Outcome: Progressing  Goal: Effective Oxygenation and Ventilation  Outcome: Progressing     Problem: Gas Exchange Impaired  Goal: Optimal Gas Exchange  Outcome: Progressing     Problem: Functional Deficit  Goal: Improved Balance and Postural Control  Outcome: Progressing  Goal: Optimal Cognitive Function  Outcome: Progressing  Goal: Optimal Coordination  Outcome: Progressing  Goal: Improved Muscle Strength  Outcome: Progressing  Goal: Improved Muscle Tone  Outcome: Progressing  Goal: Optimal Range of Motion  Outcome: Progressing  Goal: Compensation for Sensory Deficit  Outcome: Progressing

## 2024-08-05 NOTE — PROGRESS NOTES
[facility] Medicine  Progress Note    Patient Name: Thu Branch  MRN: [id number]  Patient Class: IP- Inpatient   Admission Date: 8/2/2024  Length of Stay: 3 days  Attending Physician: Ernesto Mehta MD  Primary Care Provider: Joseph Vides MD        Subjective:     Principal Problem:Acute kidney injury    Interval History:   HPI: Ms. Branch is a 73-year-old female, NH patient was transferred to the ED earlier today due to staff noting change in her mental status as well as hypoxia as noted on room air saturations.  Her reported room air saturation there was 88%.  In the ED while on RA O2 saturation noted to fall into the mid 80s.  With 2 L NC O2 saturations improved into the mid-to-high 90s.  Evaluation included CT head which was unremarkable for acute changes, CXR was thought to show RLL infiltrate and routine lab work which revealed leukocytosis and elevated renal indices consistent with GADIEL.  While in the ED was also noted to have 2 episodes of diarrhea following which a rectal tube was inserted.  Urinalysis was ordered but not collected in the ED. urinary catheter was placed following the patient's arrival to the /surge unit.  On my seeing her was awake and answered questions but did not initiate conversation.  Denied feeling short of breath, denied pain.  She did not know why she was sent to the emergency department.    8/3-when seen on rounds today appeared comfortable; she was very sleepy, awakened to light touch and would answer questions with 1 or 2 word answers but quickly fall back to sleep when not stimulated; continues to have watery stools    8/4-she was much more alert and interactive when seen on rounds today; mental status is back to her baseline; she had no complaints    8/5-there were no issues overnight; condition remained stable; she had no complaints when seen on rounds today; she remains in AFib with CVR      Objective:      Vital Signs (Most Recent):  Temp: 98 °F (36.7 °C) (08/05/24 1514)  Pulse: (!) 49 (08/05/24 1514)  Resp: 18 (08/05/24 1514)  BP: 125/78 (08/05/24 1514)  SpO2: 96 % (08/05/24 1514) Vital Signs (24h Range):  Temp:  [97.5 °F (36.4 °C)-99.4 °F (37.4 °C)] 98 °F (36.7 °C)  Pulse:  [] 49  Resp:  [16-20] 18  SpO2:  [96 %-100 %] 96 %  BP: (103-185)/(47-83) 125/78     Weight: 96.5 kg (212 lb 11.9 oz)  Body mass index is 38.91 kg/m².    Intake/Output Summary (Last 24 hours) at 8/5/2024 1618  Last data filed at 8/5/2024 1513  Gross per 24 hour   Intake 1260 ml   Output 2800 ml   Net -1540 ml      Physical exam  Constitution-obese, normally developed female in NAD  Eyes-PERRL, EOMI  HENT-normocephalic, atraumatic  Neck-supple  Respiratory-normal respirations; CTA with good air movement  Heart-irregularly irregular rhythm, normal rate; normal S1 and S2; no murmurs, gallops  Abdomen-soft, nontender, nondistended  Genitourinary-deferred  Musculoskeletal-no joint abnormalities, normal ROM throughout  Skin-warm, dry; no rashes  Neurologic-alert, oriented x3; she is essentially functionally quadriplegic    Scheduled Meds:   apixaban  5 mg Oral BID    citalopram  30 mg Oral Daily    diltiaZEM  60 mg Oral Q8H    donepeziL  10 mg Oral BID    famotidine  20 mg Oral Daily    hydrocortisone  10 mg Oral BID    levoFLOXacin  500 mg Oral Daily    memantine  10 mg Oral BID    mupirocin   Nasal BID    sodium chloride 0.9%  10 mL Intravenous Q6H     Continuous Infusions:   lactated ringers   Intravenous Continuous 50 mL/hr at 08/05/24 1200 New Bag at 08/05/24 1200     PRN Meds:.  Current Facility-Administered Medications:     acetaminophen, 650 mg, Oral, Q4H PRN    acetaminophen, 650 mg, Oral, Q8H PRN    dextrose 10%, 12.5 g, Intravenous, PRN    dextrose 10%, 25 g, Intravenous, PRN    glucagon (human recombinant), 1 mg, Intramuscular, PRN    glucose, 16 g, Oral, PRN    glucose, 24 g, Oral, PRN    insulin aspart U-100, 0-10 Units,  "Subcutaneous, QID (AC + HS) PRN    naloxone, 0.02 mg, Intravenous, PRN    sodium chloride 0.9%, 10 mL, Intravenous, Q12H PRN    Flushing PICC/Midline Protocol, , , Until Discontinued **AND** sodium chloride 0.9%, 10 mL, Intravenous, Q6H **AND** sodium chloride 0.9%, 10 mL, Intravenous, PRN    Significant Labs: All pertinent labs within the past 24 hours have been reviewed.  Blood Culture: No results for input(s): "LABBLOO" in the last 48 hours.  CBC:   Recent Labs   Lab 08/04/24  0340   WBC 9.76   HGB 9.1*   HCT 30.2*        CMP:   Recent Labs   Lab 08/04/24  0340 08/05/24  0556    142   K 3.2* 2.7*   * 113*   CO2 19* 21*   BUN 41.0* 25.0*   CREATININE 1.16* 0.87   CALCIUM 9.9 9.4     Magnesium:   Recent Labs   Lab 08/04/24  0340 08/05/24  0556   MG 2.00 1.80     POCT Glucose:   Recent Labs   Lab 08/04/24  1831 08/05/24  0538 08/05/24  1205   POCTGLUCOSE 158* 134* 163*     Urine Culture:   No results for input(s): "LABURIN" in the last 48 hours.    Urine Studies:   No results for input(s): "COLORU", "APPEARANCEUA", "PHUR", "SPECGRAV", "PROTEINUA", "GLUCUA", "KETONESU", "BILIRUBINUA", "OCCULTUA", "NITRITE", "UROBILINOGEN", "LEUKOCYTESUR", "RBCUA", "WBCUA", "BACTERIA", "SQUAMEPITHEL", "HYALINECASTS" in the last 48 hours.    Invalid input(s): "WRIGHTSUR"      Significant Imaging:   CXR  Impression:  1. Mild cardiomegaly  2. Atherosclerosis  3. Atelectasis and/or scarring left lower lung    Assessment/Plan:   Metabolic encephalopathy  Multifactorial; patient had a UTI, possible pneumonia, volume depletion and GADIEL all of which contributed to altered mental status  Resolved; mental status at baseline     Acute kidney injury/CKD, stage IIIB  Likely due to volume depletion; exacerbated by ARB  Renal indices normalizing  Avoid nephrotoxic agents/renal dose medications  Discontinue urinary catheter  Continue IV hydration     UTI  Urine culture-ESBL+ Klebsiella pneumoniae; sensitivity pattern different than " organism in blood; give initial dose ertapenem 1 g IV today  Anticipate discharge tomorrow on ertapenem 1 g IM daily 7 days to complete 10 days therapy    Gram-negative (Klebsiella) sepsis  BC's 2/2 growing ESBL+ Klebsiella pneumoniae; sensitivities noted  WBC now nl  As noted above patient will receive ertapenem dose today with plans to discharge on IM ertapenem     Possible RLL pneumonia/hypoxemia  Bronchodilator therapy, supplemental O2 as needed as patient hypoxic on room air  O2 saturation on 3 L %  Continue current antibiotics  Procalcitonin 25.5, markedly elevated     Diarrhea  Etiology: Infectious versus iatrogenic (patient was on multiple laxative type medications as well as Linzess); these were discontinued  C diff studies neg  GI panel negative  Diarrhea persists  Add Questran and banana flakes; give single dose of Imodium  Continue rectal tube until stools become formed    AFib with RVR  Patient developed AFib with RVR during the day on 8/4 and responded with good rate control to IV diltiazem; was maintained on diltiazem drip overnight but was discontinued early this morning due to bradycardia (rates dropped into the 50s)  She remains in AFib; she has maintained good blood pressures  Echo, 03/08/2024-EF 65-70% with grade 1 DD; normal left atrial size  Patient started on oral diltiazem 60 mg q.8 hours this morning; can be converted to diltiazem  mg daily at discharge  CHADS-VASc 5-OAC indicated; patient started on apixaban 5 mg b.i.d.     Diabetes mellitus  Monitor blood sugars with Accu-Cheks and SSI  Add basal insulin as necessary     Hypertension  Blood pressure rising  Cont amlodipine 5 mg daily  Consider resuming ARB at discharge at decreased dose as diltiazem has been added to regimen    Hypokalemia  Supplement as necessary     Patient on chronic hydrocortisone  Taper dose back to 5 mg daily dosing at discharge    Extreme debility  Patient is bed ridden, requires total care, functionally  quadriplegic  BSE revealed minimal aspiration; small bites with gravy and thin liquids recommended     GI prophylaxis: Famotidine     Code status: DNR   Active Diagnoses:    Diagnosis Date Noted POA    PRINCIPAL PROBLEM:  Acute kidney injury [N17.9] 05/12/2024 Yes      Problems Resolved During this Admission:     VTE Risk Mitigation (From admission, onward)           Ordered     apixaban tablet 5 mg  2 times daily         08/05/24 0758     IP VTE HIGH RISK PATIENT  Once         08/02/24 1456                       Ernesto Mehta MD  Department of Hospital Medicine   [facility]

## 2024-08-05 NOTE — PLAN OF CARE
Problem: SLP  Goal: SLP Goal  Description: LTG:  -Pt will maintain adequate hydration/nutrition with optimum safety and efficiency of swallowing function on P.O. intake without overt signs and symptoms of aspiration for the highest appropriate diet level.    -Pt will utilize compensatory strategies with optimum safety and efficiency of swallowing function on P.O. intake without overt signs and symptoms of aspiration for the highest appropriate diet level.        STG:  -Pt will complete a Modified Barium Swallow Study to fully assess physiology and anatomy of the swallow and to determine the appropriate diet and/or rehabilitation exercises/POC.    -Pt will tolerate diet upgrade trials without signs and/or symptoms of aspiration with to safely least restrictive diet with (min/mod/max) verbal, visual and tactile cues.      -Pt will safely ingest diet trials during therapeutic feedings with the SLP without signs and/or symptoms of aspiration with to safely consume least restrictive diet with (min/mod/max) verbal, visual and tactile cues.    Outcome: Progressing

## 2024-08-06 LAB
ANION GAP SERPL CALC-SCNC: 7 MEQ/L
BUN SERPL-MCNC: 23 MG/DL (ref 9.8–20.1)
CALCIUM SERPL-MCNC: 9.4 MG/DL (ref 8.4–10.2)
CHLORIDE SERPL-SCNC: 112 MMOL/L (ref 98–107)
CO2 SERPL-SCNC: 23 MMOL/L (ref 23–31)
CREAT SERPL-MCNC: 0.67 MG/DL (ref 0.55–1.02)
CREAT/UREA NIT SERPL: 34
GFR SERPLBLD CREATININE-BSD FMLA CKD-EPI: >60 ML/MIN/1.73/M2
GLUCOSE SERPL-MCNC: 188 MG/DL (ref 82–115)
MAGNESIUM SERPL-MCNC: 1.9 MG/DL (ref 1.6–2.6)
POCT GLUCOSE: 153 MG/DL (ref 70–110)
POCT GLUCOSE: 160 MG/DL (ref 70–110)
POCT GLUCOSE: 169 MG/DL (ref 70–110)
POCT GLUCOSE: 198 MG/DL (ref 70–110)
POTASSIUM SERPL-SCNC: 2.6 MMOL/L (ref 3.5–5.1)
SODIUM SERPL-SCNC: 142 MMOL/L (ref 136–145)

## 2024-08-06 PROCEDURE — 27000207 HC ISOLATION

## 2024-08-06 PROCEDURE — 80048 BASIC METABOLIC PNL TOTAL CA: CPT | Performed by: EMERGENCY MEDICINE

## 2024-08-06 PROCEDURE — 83735 ASSAY OF MAGNESIUM: CPT | Performed by: EMERGENCY MEDICINE

## 2024-08-06 PROCEDURE — 92526 ORAL FUNCTION THERAPY: CPT

## 2024-08-06 PROCEDURE — 25000003 PHARM REV CODE 250: Performed by: INTERNAL MEDICINE

## 2024-08-06 PROCEDURE — 25000003 PHARM REV CODE 250: Performed by: EMERGENCY MEDICINE

## 2024-08-06 PROCEDURE — 63600175 PHARM REV CODE 636 W HCPCS: Performed by: INTERNAL MEDICINE

## 2024-08-06 PROCEDURE — A4216 STERILE WATER/SALINE, 10 ML: HCPCS | Performed by: EMERGENCY MEDICINE

## 2024-08-06 PROCEDURE — 93010 ELECTROCARDIOGRAM REPORT: CPT | Mod: ,,, | Performed by: INTERNAL MEDICINE

## 2024-08-06 PROCEDURE — 99900035 HC TECH TIME PER 15 MIN (STAT)

## 2024-08-06 PROCEDURE — 25000242 PHARM REV CODE 250 ALT 637 W/ HCPCS: Performed by: EMERGENCY MEDICINE

## 2024-08-06 PROCEDURE — 93005 ELECTROCARDIOGRAM TRACING: CPT

## 2024-08-06 PROCEDURE — 94761 N-INVAS EAR/PLS OXIMETRY MLT: CPT

## 2024-08-06 PROCEDURE — 94640 AIRWAY INHALATION TREATMENT: CPT

## 2024-08-06 PROCEDURE — 21400001 HC TELEMETRY ROOM

## 2024-08-06 PROCEDURE — 63600175 PHARM REV CODE 636 W HCPCS: Performed by: EMERGENCY MEDICINE

## 2024-08-06 PROCEDURE — 36415 COLL VENOUS BLD VENIPUNCTURE: CPT | Performed by: EMERGENCY MEDICINE

## 2024-08-06 RX ORDER — METOPROLOL TARTRATE 1 MG/ML
5 INJECTION, SOLUTION INTRAVENOUS
Status: DISCONTINUED | OUTPATIENT
Start: 2024-08-06 | End: 2024-08-06

## 2024-08-06 RX ORDER — DIPHENOXYLATE HYDROCHLORIDE AND ATROPINE SULFATE 2.5; .025 MG/1; MG/1
1 TABLET ORAL 4 TIMES DAILY PRN
Status: DISCONTINUED | OUTPATIENT
Start: 2024-08-06 | End: 2024-08-09 | Stop reason: HOSPADM

## 2024-08-06 RX ORDER — METOPROLOL TARTRATE 1 MG/ML
5 INJECTION, SOLUTION INTRAVENOUS
Status: DISCONTINUED | OUTPATIENT
Start: 2024-08-06 | End: 2024-08-09 | Stop reason: HOSPADM

## 2024-08-06 RX ORDER — POTASSIUM CHLORIDE 7.45 MG/ML
10 INJECTION INTRAVENOUS
Status: COMPLETED | OUTPATIENT
Start: 2024-08-06 | End: 2024-08-06

## 2024-08-06 RX ORDER — BISMUTH SUBSALICYLATE 525 MG/30ML
30 LIQUID ORAL EVERY 6 HOURS PRN
Status: DISCONTINUED | OUTPATIENT
Start: 2024-08-06 | End: 2024-08-09 | Stop reason: HOSPADM

## 2024-08-06 RX ADMIN — ACETAMINOPHEN 650 MG: 325 TABLET, FILM COATED ORAL at 04:08

## 2024-08-06 RX ADMIN — SODIUM CHLORIDE, PRESERVATIVE FREE 10 ML: 5 INJECTION INTRAVENOUS at 12:08

## 2024-08-06 RX ADMIN — DILTIAZEM HYDROCHLORIDE 60 MG: 60 TABLET, FILM COATED ORAL at 02:08

## 2024-08-06 RX ADMIN — IPRATROPIUM BROMIDE AND ALBUTEROL SULFATE 3 ML: .5; 3 SOLUTION RESPIRATORY (INHALATION) at 01:08

## 2024-08-06 RX ADMIN — MEMANTINE HYDROCHLORIDE 10 MG: 5 TABLET ORAL at 08:08

## 2024-08-06 RX ADMIN — DONEPEZIL HYDROCHLORIDE 10 MG: 5 TABLET, FILM COATED ORAL at 08:08

## 2024-08-06 RX ADMIN — ACETAMINOPHEN 650 MG: 325 TABLET, FILM COATED ORAL at 06:08

## 2024-08-06 RX ADMIN — MUPIROCIN 1 G: 20 OINTMENT TOPICAL at 09:08

## 2024-08-06 RX ADMIN — CITALOPRAM HYDROBROMIDE 30 MG: 20 TABLET ORAL at 09:08

## 2024-08-06 RX ADMIN — HYDROCORTISONE 10 MG: 5 TABLET ORAL at 09:08

## 2024-08-06 RX ADMIN — SODIUM CHLORIDE, POTASSIUM CHLORIDE, SODIUM LACTATE AND CALCIUM CHLORIDE: 600; 310; 30; 20 INJECTION, SOLUTION INTRAVENOUS at 06:08

## 2024-08-06 RX ADMIN — METOPROLOL TARTRATE 5 MG: 1 INJECTION, SOLUTION INTRAVENOUS at 03:08

## 2024-08-06 RX ADMIN — ACETAMINOPHEN 650 MG: 325 TABLET, FILM COATED ORAL at 09:08

## 2024-08-06 RX ADMIN — DILTIAZEM HYDROCHLORIDE 60 MG: 60 TABLET, FILM COATED ORAL at 10:08

## 2024-08-06 RX ADMIN — METOPROLOL TARTRATE 5 MG: 1 INJECTION, SOLUTION INTRAVENOUS at 12:08

## 2024-08-06 RX ADMIN — SODIUM CHLORIDE, PRESERVATIVE FREE 10 ML: 5 INJECTION INTRAVENOUS at 02:08

## 2024-08-06 RX ADMIN — MUPIROCIN 2 G: 20 OINTMENT TOPICAL at 08:08

## 2024-08-06 RX ADMIN — HYDROCORTISONE 10 MG: 5 TABLET ORAL at 08:08

## 2024-08-06 RX ADMIN — APIXABAN 5 MG: 5 TABLET, FILM COATED ORAL at 08:08

## 2024-08-06 RX ADMIN — SODIUM CHLORIDE, PRESERVATIVE FREE 10 ML: 5 INJECTION INTRAVENOUS at 05:08

## 2024-08-06 RX ADMIN — CHOLESTYRAMINE POWDER FOR SUSPENSION 4 G: 4 POWDER, FOR SUSPENSION ORAL at 08:08

## 2024-08-06 RX ADMIN — ACETAMINOPHEN 650 MG: 325 TABLET, FILM COATED ORAL at 08:08

## 2024-08-06 RX ADMIN — INSULIN ASPART 2 UNITS: 100 INJECTION, SOLUTION INTRAVENOUS; SUBCUTANEOUS at 06:08

## 2024-08-06 RX ADMIN — MEMANTINE HYDROCHLORIDE 10 MG: 5 TABLET ORAL at 09:08

## 2024-08-06 RX ADMIN — FAMOTIDINE 20 MG: 20 TABLET ORAL at 09:08

## 2024-08-06 RX ADMIN — DONEPEZIL HYDROCHLORIDE 10 MG: 5 TABLET, FILM COATED ORAL at 09:08

## 2024-08-06 RX ADMIN — CHOLESTYRAMINE POWDER FOR SUSPENSION 4 G: 4 POWDER, FOR SUSPENSION ORAL at 09:08

## 2024-08-06 RX ADMIN — DILTIAZEM HYDROCHLORIDE 60 MG: 60 TABLET, FILM COATED ORAL at 06:08

## 2024-08-06 RX ADMIN — POTASSIUM CHLORIDE 10 MEQ: 7.46 INJECTION, SOLUTION INTRAVENOUS at 12:08

## 2024-08-06 RX ADMIN — POTASSIUM CHLORIDE 10 MEQ: 7.46 INJECTION, SOLUTION INTRAVENOUS at 09:08

## 2024-08-06 RX ADMIN — POTASSIUM CHLORIDE 10 MEQ: 7.46 INJECTION, SOLUTION INTRAVENOUS at 11:08

## 2024-08-06 RX ADMIN — MEROPENEM 1 G: 1 INJECTION, POWDER, FOR SOLUTION INTRAVENOUS at 08:08

## 2024-08-06 RX ADMIN — MEROPENEM 1 G: 1 INJECTION, POWDER, FOR SOLUTION INTRAVENOUS at 02:08

## 2024-08-06 RX ADMIN — SODIUM CHLORIDE, PRESERVATIVE FREE 10 ML: 5 INJECTION INTRAVENOUS at 06:08

## 2024-08-06 RX ADMIN — APIXABAN 5 MG: 5 TABLET, FILM COATED ORAL at 09:08

## 2024-08-06 RX ADMIN — IPRATROPIUM BROMIDE AND ALBUTEROL SULFATE 3 ML: .5; 3 SOLUTION RESPIRATORY (INHALATION) at 07:08

## 2024-08-06 NOTE — PT/OT/SLP PROGRESS
[facility]  Language Pathology Department  Therapy Progress Note    Patient Name:  Thu Branch   MRN:  [id number]  Admitting Diagnosis: GADIEL    Recommendations:     General recommendations:  SLP follow up 2024  Communication strategies:  yes/no questions only, provide increased time to answer, and go to room if call light pushed    Discharge therapy intensity: Moderate Intensity Therapy   Barriers to safe discharge: CM arranging d/c back to SNF/NH    Subjective     Patient sleeping upon SLP's arrival. Nsg present preparing medication. Pt drowsy, arouses easily but quickly falls asleep if not stimulated. Oriented to self (Name, ), place, and situation. Not oriented to time/date. Re-oriented as appropriate. No family present.     Pain/Comfort: Denies any pain/discomfort  Spiritual/Cultural/Zoroastrianism Beliefs/Practices that affect care: No  Respiratory Status: O2 via NC    Objective:     SLP present to f/u with diet tolerance following bedside swallow evaluation completed on 24. Nsg unable to report how Pt did with breakfast. SLP remained and assisted with med pass to assess tolerance. Pt took pills one at a time with thin liquid flush. Overall, tolerated well with delayed cough x 1. Required constant verbal cues to reduced drinking rate via straw. SLP utilized controlled pinch as well as just removal of straw from mouth to ensure small sips. Recommend to continue current diet. Will continue to monitor.     Assessment:     Tolerating oral diet of minced and moist with thin liquids. Pt will continue to require total assistance during meals secondary to drowsiness, cognition, and aspiration risk. Please notified the ST dept with any concerns.     Goals:     Multidisciplinary Problems       SLP Goals          Problem: SLP    Goal Priority Disciplines Outcome   SLP Goal     SLP Progressing   Description: LTG:  -Pt will maintain adequate hydration/nutrition with optimum safety and  efficiency of swallowing function on P.O. intake without overt signs and symptoms of aspiration for the highest appropriate diet level.    -Pt will utilize compensatory strategies with optimum safety and efficiency of swallowing function on P.O. intake without overt signs and symptoms of aspiration for the highest appropriate diet level.        STG:  -Pt will complete a Modified Barium Swallow Study to fully assess physiology and anatomy of the swallow and to determine the appropriate diet and/or rehabilitation exercises/POC.    -Pt will tolerate diet upgrade trials without signs and/or symptoms of aspiration with to safely least restrictive diet with (min/mod/max) verbal, visual and tactile cues.      -Pt will safely ingest diet trials during therapeutic feedings with the SLP without signs and/or symptoms of aspiration with to safely consume least restrictive diet with (min/mod/max) verbal, visual and tactile cues.                       Patient Education:     Patient provided with verbal education regarding diet.  Patient was able to return demonstration.    Plan:     Will continue to follow and tx as appropriate.    SLP Follow-Up:  Yes   Patient to be seen:   (3-5 x/week)   Plan of Care expires:  08/12/24  Plan of Care reviewed with:  patient     Time Tracking:     SLP Treatment Date:   08/06/24  Speech Start Time:  0945  Speech Stop Time:  1000     Speech Total Time (min):  15 min    Billable minutes:  Treatment of Swallow Dysfunction, 15 minutes     Bhargavi Talavera MS, CCC-SLP  08/06/2024

## 2024-08-06 NOTE — PLAN OF CARE
Problem: Adult Inpatient Plan of Care  Goal: Plan of Care Review  Outcome: Progressing  Goal: Patient-Specific Goal (Individualized)  Outcome: Progressing  Goal: Absence of Hospital-Acquired Illness or Injury  Outcome: Progressing  Goal: Optimal Comfort and Wellbeing  Outcome: Progressing  Goal: Readiness for Transition of Care  Outcome: Progressing     Problem: Diabetes Comorbidity  Goal: Blood Glucose Level Within Targeted Range  Outcome: Progressing     Problem: Acute Kidney Injury/Impairment  Goal: Fluid and Electrolyte Balance  Outcome: Progressing  Goal: Improved Oral Intake  Outcome: Progressing  Goal: Effective Renal Function  Outcome: Progressing     Problem: Skin Injury Risk Increased  Goal: Skin Health and Integrity  Outcome: Progressing     Problem: Comorbidity Management  Goal: Blood Pressure in Desired Range  Outcome: Progressing     Problem: Pneumonia  Goal: Fluid Balance  Outcome: Progressing  Goal: Resolution of Infection Signs and Symptoms  Outcome: Progressing  Goal: Effective Oxygenation and Ventilation  Outcome: Progressing

## 2024-08-06 NOTE — PLAN OF CARE
08/06/24 0919   Discharge Assessment   Assessment Type Discharge Planning Assessment   Source of Information health record   People in Home facility resident   Facility Arrived From: [facility]ident of [facility]   Do you expect to return to your current living situation? Yes   Equipment Currently Used at Home none   Discharge Plan A Return to nursing home   Discharge Plan B Return to Nursing Home   DME Needed Upon Discharge  none   Transition of Care Barriers None   Physical Activity   On average, how many days per week do you engage in moderate to strenuous exercise (like a brisk walk)? Pt Declined   On average, how many minutes do you engage in exercise at this level? Pt Declined   Financial Resource Strain   How hard is it for you to pay for the very basics like food, housing, medical care, and heating? Pt Declined   Housing Stability   In the last 12 months, was there a time when you were not able to pay the mortgage or rent on time? Pt Declined   At any time in the past 12 months, were you homeless or living in a shelter (including now)? Pt Declined   Transportation Needs   Has the lack of transportation kept you from medical appointments, meetings, work or from getting things needed for daily living? Patient declined   Food Insecurity   Within the past 12 months, you worried that your food would run out before you got the money to buy more. Pt Declined   Within the past 12 months, the food you bought just didn't last and you didn't have money to get more. Pt Declined   Stress   Do you feel stress - tense, restless, nervous, or anxious, or unable to sleep at night because your mind is troubled all the time - these days? Pt Declined   Social Isolation   How often do you feel lonely or isolated from those around you?  Patient declined   Alcohol Use   Q1: How often do you have a drink containing alcohol? Pt Declined   Q2: How many drinks containing alcohol do you have on a typical day when you are drinking? Pt  Declined   Q3: How often do you have six or more drinks on one occasion? Pt Declined   Utilities   In the past 12 months has the electric, gas, oil, or water company threatened to shut off services in your home? Pt Declined   Health Literacy   How often do you need to have someone help you when you read instructions, pamphlets, or other written material from your doctor or pharmacy? Patient declines to respond

## 2024-08-06 NOTE — PLAN OF CARE
Problem: Adult Inpatient Plan of Care  Goal: Plan of Care Review  Outcome: Progressing  Goal: Patient-Specific Goal (Individualized)  Outcome: Progressing  Goal: Absence of Hospital-Acquired Illness or Injury  Outcome: Progressing  Goal: Optimal Comfort and Wellbeing  Outcome: Progressing  Goal: Readiness for Transition of Care  Outcome: Progressing     Problem: Diabetes Comorbidity  Goal: Blood Glucose Level Within Targeted Range  Outcome: Progressing     Problem: Acute Kidney Injury/Impairment  Goal: Fluid and Electrolyte Balance  Outcome: Progressing  Goal: Improved Oral Intake  Outcome: Progressing  Goal: Effective Renal Function  Outcome: Progressing     Problem: Infection  Goal: Absence of Infection Signs and Symptoms  Outcome: Progressing     Problem: Fall Injury Risk  Goal: Absence of Fall and Fall-Related Injury  Outcome: Progressing     Problem: Skin Injury Risk Increased  Goal: Skin Health and Integrity  Outcome: Progressing     Problem: Comorbidity Management  Goal: Blood Pressure in Desired Range  Outcome: Progressing     Problem: UTI (Urinary Tract Infection)  Goal: Improved Infection Symptoms  Outcome: Progressing     Problem: Pneumonia  Goal: Fluid Balance  Outcome: Progressing  Goal: Resolution of Infection Signs and Symptoms  Outcome: Progressing  Goal: Effective Oxygenation and Ventilation  Outcome: Progressing     Problem: Gas Exchange Impaired  Goal: Optimal Gas Exchange  Outcome: Progressing     Problem: Functional Deficit  Goal: Improved Balance and Postural Control  Outcome: Progressing  Goal: Optimal Cognitive Function  Outcome: Progressing  Goal: Optimal Coordination  Outcome: Progressing  Goal: Improved Muscle Strength  Outcome: Progressing  Goal: Improved Muscle Tone  Outcome: Progressing  Goal: Optimal Range of Motion  Outcome: Progressing  Goal: Compensation for Sensory Deficit  Outcome: Progressing     Problem: Dysrhythmia  Goal: Normalized Cardiac Rhythm  Outcome: Progressing

## 2024-08-06 NOTE — PROGRESS NOTES
[facility] Medicine  Progress Note    Patient Name: Thu Branch  MRN: [id number]  Patient Class: IP- Inpatient   Admission Date: 8/2/2024  Length of Stay: 4 days  Attending Physician: Ernesto Mehta MD  Primary Care Provider: Joseph Vides MD        Subjective:     Principal Problem:Acute kidney injury    Interval History:   HPI: Ms. Branch is a 73-year-old female, NH patient was transferred to the ED earlier today due to staff noting change in her mental status as well as hypoxia as noted on room air saturations.  Her reported room air saturation there was 88%.  In the ED while on RA O2 saturation noted to fall into the mid 80s.  With 2 L NC O2 saturations improved into the mid-to-high 90s.  Evaluation included CT head which was unremarkable for acute changes, CXR was thought to show RLL infiltrate and routine lab work which revealed leukocytosis and elevated renal indices consistent with GADIEL.  While in the ED was also noted to have 2 episodes of diarrhea following which a rectal tube was inserted.  Urinalysis was ordered but not collected in the ED. urinary catheter was placed following the patient's arrival to the /surge unit.  On my seeing her was awake and answered questions but did not initiate conversation.  Denied feeling short of breath, denied pain.  She did not know why she was sent to the emergency department.    8/3-when seen on rounds today appeared comfortable; she was very sleepy, awakened to light touch and would answer questions with 1 or 2 word answers but quickly fall back to sleep when not stimulated; continues to have watery stools    8/4-she was much more alert and interactive when seen on rounds today; mental status is back to her baseline; she had no complaints    8/5-there were no issues overnight; condition remained stable; she had no complaints when seen on rounds today; she remains in AFib with CVR    August 6,  2024-complain of large amount of bile colored diarrhea through rectal tube, no nausea, no vomiting, no abdominal pain  Objective:     Vital Signs (Most Recent):  Temp: 97.8 °F (36.6 °C) (08/06/24 0815)  Pulse: 78 (08/06/24 0815)  Resp: 20 (08/06/24 0741)  BP: (!) 175/77 (08/06/24 0320)  SpO2: 96 % (08/06/24 0815) Vital Signs (24h Range):  Temp:  [97.8 °F (36.6 °C)-99 °F (37.2 °C)] 97.8 °F (36.6 °C)  Pulse:  [] 78  Resp:  [16-20] 20  SpO2:  [91 %-99 %] 96 %  BP: (125-175)/(71-78) 175/77     Weight: 96.5 kg (212 lb 11.9 oz)  Body mass index is 38.91 kg/m².    Intake/Output Summary (Last 24 hours) at 8/6/2024 1041  Last data filed at 8/6/2024 0820  Gross per 24 hour   Intake 4358.52 ml   Output 800 ml   Net 3558.52 ml      Physical exam  Constitution-obese, normally developed female in NAD  Eyes-PERRL, EOMI  HENT-normocephalic, atraumatic  Neck-supple  Respiratory-normal respirations; CTA with good air movement  Heart-irregularly irregular rhythm, normal rate; normal S1 and S2; no murmurs, gallops  Abdomen-soft, nontender, nondistended  Genitourinary-deferred  Musculoskeletal-no joint abnormalities, normal ROM throughout  Skin-warm, dry; no rashes  Neurologic-alert, oriented x3; she is essentially functionally quadriplegic    Scheduled Meds:   albuterol-ipratropium  3 mL Nebulization Q6H    apixaban  5 mg Oral BID    cholestyramine  1 packet Oral BID    citalopram  30 mg Oral Daily    diltiaZEM  60 mg Oral Q8H    donepeziL  10 mg Oral BID    famotidine  20 mg Oral Daily    hydrocortisone  10 mg Oral BID    memantine  10 mg Oral BID    mupirocin   Nasal BID    potassium chloride  10 mEq Intravenous Q2H    sodium chloride 0.9%  10 mL Intravenous Q6H     Continuous Infusions:   lactated ringers   Intravenous Continuous 50 mL/hr at 08/06/24 0635 New Bag at 08/06/24 0635     PRN Meds:.  Current Facility-Administered Medications:     acetaminophen, 650 mg, Oral, Q4H PRN    acetaminophen, 650 mg, Oral, Q8H PRN     "dextrose 10%, 12.5 g, Intravenous, PRN    dextrose 10%, 25 g, Intravenous, PRN    glucagon (human recombinant), 1 mg, Intramuscular, PRN    glucose, 16 g, Oral, PRN    glucose, 24 g, Oral, PRN    insulin aspart U-100, 0-10 Units, Subcutaneous, QID (AC + HS) PRN    naloxone, 0.02 mg, Intravenous, PRN    sodium chloride 0.9%, 10 mL, Intravenous, Q12H PRN    Flushing PICC/Midline Protocol, , , Until Discontinued **AND** sodium chloride 0.9%, 10 mL, Intravenous, Q6H **AND** sodium chloride 0.9%, 10 mL, Intravenous, PRN    Significant Labs: All pertinent labs within the past 24 hours have been reviewed.  Blood Culture: No results for input(s): "LABBLOO" in the last 48 hours.  CBC:   No results for input(s): "WBC", "HGB", "HCT", "PLT" in the last 48 hours.    CMP:   Recent Labs   Lab 08/05/24  0556 08/06/24  0425    142   K 2.7* 2.6*   * 112*   CO2 21* 23   BUN 25.0* 23.0*   CREATININE 0.87 0.67   CALCIUM 9.4 9.4     Magnesium:   Recent Labs   Lab 08/05/24  0556 08/06/24  0425   MG 1.80 1.90     POCT Glucose:   Recent Labs   Lab 08/05/24  1703 08/06/24  0211 08/06/24  0606   POCTGLUCOSE 129* 198* 169*     Urine Culture:   No results for input(s): "LABURIN" in the last 48 hours.    Urine Studies:   No results for input(s): "COLORU", "APPEARANCEUA", "PHUR", "SPECGRAV", "PROTEINUA", "GLUCUA", "KETONESU", "BILIRUBINUA", "OCCULTUA", "NITRITE", "UROBILINOGEN", "LEUKOCYTESUR", "RBCUA", "WBCUA", "BACTERIA", "SQUAMEPITHEL", "HYALINECASTS" in the last 48 hours.    Invalid input(s): "WRIGHTSUR"      Significant Imaging:   CXR  Impression:  1. Mild cardiomegaly  2. Atherosclerosis  3. Atelectasis and/or scarring left lower lung    Assessment/Plan:   Metabolic encephalopathy  Multifactorial; patient had a UTI, possible pneumonia, volume depletion and GADIEL all of which contributed to altered mental status  Resolved; mental status at baseline     Acute kidney injury/CKD, stage IIIB  Likely due to volume depletion; exacerbated " by ARB  Renal indices normalizing  Avoid nephrotoxic agents/renal dose medications  Discontinue urinary catheter  Continue IV hydration     UTI  Urine culture-ESBL+ Klebsiella pneumoniae; sensitivity pattern different than organism in blood; Anticipate  Continue IV ertapenem    discharge  on ertapenem 1 g IM daily 7 days to complete 10 days therapy    Gram-negative (Klebsiella) sepsis  BC's 2/2 growing ESBL+ Klebsiella pneumoniae; sensitivities noted  WBC now nl  As noted above patient will receive ertapenem dose today with plans to discharge on IM ertapenem     Possible RLL pneumonia/hypoxemia  Bronchodilator therapy, supplemental O2 as needed as patient hypoxic on room air  O2 saturation on 3 L %  Continue current antibiotics  Procalcitonin 25.5, markedly elevated     Diarrhea  Etiology: Infectious versus iatrogenic (patient was on multiple laxative type medications as well as Linzess); these were discontinued  C diff studies neg  GI panel negative  Diarrhea persists  Add Questran and banana flakes; give single   Continue Pepto-Bismol and Lomotil  Continue rectal tube until stools become formed    AFib with RVR  Patient developed AFib with RVR during the day on 8/4 and responded with good rate control to IV diltiazem; was maintained on diltiazem drip overnight but was discontinued early this morning due to bradycardia (rates dropped into the 50s)  She remains in AFib; she has maintained good blood pressures  Echo, 03/08/2024-EF 65-70% with grade 1 DD; normal left atrial size  Patient started on oral diltiazem 60 mg q.8 hours this morning; can be converted to diltiazem  mg daily at discharge  CHADS-VASc 5-OAC indicated; patient started on apixaban 5 mg b.i.d.     Diabetes mellitus  Monitor blood sugars with Accu-Cheks and SSI  Add basal insulin as necessary     Hypertension  Blood pressure rising  Cont amlodipine 5 mg daily  Consider resuming ARB at discharge at decreased dose as diltiazem has been  added to regimen    Hypokalemia  Supplement as necessary     Patient on chronic hydrocortisone  Taper dose back to 5 mg daily dosing at discharge    Extreme debility  Patient is bed ridden, requires total care, functionally quadriplegic  BSE revealed minimal aspiration; small bites with gravy and thin liquids recommended  The patient is on DNR comfort measures only,   patient is from nursing       GI prophylaxis: Famotidine     Code status: DNR   Active Diagnoses:    Diagnosis Date Noted POA    PRINCIPAL PROBLEM:  Acute kidney injury [N17.9] 05/12/2024 Yes      Problems Resolved During this Admission:     VTE Risk Mitigation (From admission, onward)           Ordered     apixaban tablet 5 mg  2 times daily         08/05/24 0758     IP VTE HIGH RISK PATIENT  Once         08/02/24 1456                       Jad Rock MD  Department of Hospital Medicine   [facility]

## 2024-08-07 LAB
ABO + RH BLD: NORMAL
ANION GAP SERPL CALC-SCNC: 7 MEQ/L
BASOPHILS # BLD AUTO: 0.01 X10(3)/MCL
BASOPHILS NFR BLD AUTO: 0.1 %
BLD PROD TYP BPU: NORMAL
BLOOD UNIT EXPIRATION DATE: NORMAL
BLOOD UNIT TYPE CODE: 6200
BUN SERPL-MCNC: 24 MG/DL (ref 9.8–20.1)
CALCIUM SERPL-MCNC: 9.3 MG/DL (ref 8.4–10.2)
CHLORIDE SERPL-SCNC: 113 MMOL/L (ref 98–107)
CO2 SERPL-SCNC: 24 MMOL/L (ref 23–31)
CREAT SERPL-MCNC: 0.67 MG/DL (ref 0.55–1.02)
CREAT/UREA NIT SERPL: 36
CROSSMATCH INTERPRETATION: NORMAL
DISPENSE STATUS: NORMAL
EOSINOPHIL # BLD AUTO: 0.13 X10(3)/MCL (ref 0–0.9)
EOSINOPHIL NFR BLD AUTO: 1 %
ERYTHROCYTE [DISTWIDTH] IN BLOOD BY AUTOMATED COUNT: 15.4 % (ref 11.5–17)
GFR SERPLBLD CREATININE-BSD FMLA CKD-EPI: >60 ML/MIN/1.73/M2
GLUCOSE SERPL-MCNC: 173 MG/DL (ref 82–115)
GROUP & RH: NORMAL
HCT VFR BLD AUTO: 22.4 % (ref 37–47)
HGB BLD-MCNC: 6.8 G/DL (ref 12–16)
IMM GRANULOCYTES # BLD AUTO: 0.37 X10(3)/MCL (ref 0–0.04)
IMM GRANULOCYTES NFR BLD AUTO: 2.9 %
INDIRECT COOMBS: NORMAL
LYMPHOCYTES # BLD AUTO: 1.38 X10(3)/MCL (ref 0.6–4.6)
LYMPHOCYTES NFR BLD AUTO: 10.8 %
MCH RBC QN AUTO: 25.2 PG (ref 27–31)
MCHC RBC AUTO-ENTMCNC: 30.4 G/DL (ref 33–36)
MCV RBC AUTO: 83 FL (ref 80–94)
MONOCYTES # BLD AUTO: 0.89 X10(3)/MCL (ref 0.1–1.3)
MONOCYTES NFR BLD AUTO: 7 %
NEUTROPHILS # BLD AUTO: 10.02 X10(3)/MCL (ref 2.1–9.2)
NEUTROPHILS NFR BLD AUTO: 78.2 %
PLATELET # BLD AUTO: 279 X10(3)/MCL (ref 130–400)
PMV BLD AUTO: 11.1 FL (ref 7.4–10.4)
POCT GLUCOSE: 151 MG/DL (ref 70–110)
POCT GLUCOSE: 167 MG/DL (ref 70–110)
POCT GLUCOSE: 172 MG/DL (ref 70–110)
POCT GLUCOSE: 173 MG/DL (ref 70–110)
POTASSIUM SERPL-SCNC: 2.8 MMOL/L (ref 3.5–5.1)
RBC # BLD AUTO: 2.7 X10(6)/MCL (ref 4.2–5.4)
SODIUM SERPL-SCNC: 144 MMOL/L (ref 136–145)
SPECIMEN OUTDATE: NORMAL
UNIT NUMBER: NORMAL
WBC # BLD AUTO: 12.8 X10(3)/MCL (ref 4.5–11.5)

## 2024-08-07 PROCEDURE — 85025 COMPLETE CBC W/AUTO DIFF WBC: CPT | Performed by: INTERNAL MEDICINE

## 2024-08-07 PROCEDURE — 86923 COMPATIBILITY TEST ELECTRIC: CPT | Performed by: INTERNAL MEDICINE

## 2024-08-07 PROCEDURE — 27000207 HC ISOLATION

## 2024-08-07 PROCEDURE — 63600175 PHARM REV CODE 636 W HCPCS: Performed by: EMERGENCY MEDICINE

## 2024-08-07 PROCEDURE — 25000003 PHARM REV CODE 250: Performed by: INTERNAL MEDICINE

## 2024-08-07 PROCEDURE — 25000003 PHARM REV CODE 250: Performed by: EMERGENCY MEDICINE

## 2024-08-07 PROCEDURE — 25000242 PHARM REV CODE 250 ALT 637 W/ HCPCS: Performed by: EMERGENCY MEDICINE

## 2024-08-07 PROCEDURE — 86900 BLOOD TYPING SEROLOGIC ABO: CPT | Performed by: INTERNAL MEDICINE

## 2024-08-07 PROCEDURE — 63600175 PHARM REV CODE 636 W HCPCS: Performed by: INTERNAL MEDICINE

## 2024-08-07 PROCEDURE — 21400001 HC TELEMETRY ROOM

## 2024-08-07 PROCEDURE — A4216 STERILE WATER/SALINE, 10 ML: HCPCS | Performed by: EMERGENCY MEDICINE

## 2024-08-07 PROCEDURE — 80048 BASIC METABOLIC PNL TOTAL CA: CPT | Performed by: INTERNAL MEDICINE

## 2024-08-07 PROCEDURE — 30233N1 TRANSFUSION OF NONAUTOLOGOUS RED BLOOD CELLS INTO PERIPHERAL VEIN, PERCUTANEOUS APPROACH: ICD-10-PCS | Performed by: INTERNAL MEDICINE

## 2024-08-07 PROCEDURE — 94761 N-INVAS EAR/PLS OXIMETRY MLT: CPT

## 2024-08-07 PROCEDURE — 36430 TRANSFUSION BLD/BLD COMPNT: CPT

## 2024-08-07 PROCEDURE — 86901 BLOOD TYPING SEROLOGIC RH(D): CPT | Performed by: INTERNAL MEDICINE

## 2024-08-07 PROCEDURE — 94640 AIRWAY INHALATION TREATMENT: CPT

## 2024-08-07 PROCEDURE — P9016 RBC LEUKOCYTES REDUCED: HCPCS | Performed by: INTERNAL MEDICINE

## 2024-08-07 PROCEDURE — 36415 COLL VENOUS BLD VENIPUNCTURE: CPT | Performed by: INTERNAL MEDICINE

## 2024-08-07 PROCEDURE — 86850 RBC ANTIBODY SCREEN: CPT | Performed by: INTERNAL MEDICINE

## 2024-08-07 RX ORDER — HYDROCODONE BITARTRATE AND ACETAMINOPHEN 5; 325 MG/1; MG/1
1 TABLET ORAL EVERY 6 HOURS PRN
Status: DISCONTINUED | OUTPATIENT
Start: 2024-08-07 | End: 2024-08-07

## 2024-08-07 RX ORDER — HYDROCODONE BITARTRATE AND ACETAMINOPHEN 5; 325 MG/1; MG/1
1 TABLET ORAL ONCE
Status: COMPLETED | OUTPATIENT
Start: 2024-08-07 | End: 2024-08-07

## 2024-08-07 RX ORDER — HYDROCODONE BITARTRATE AND ACETAMINOPHEN 5; 325 MG/1; MG/1
1 TABLET ORAL EVERY 4 HOURS PRN
Status: DISCONTINUED | OUTPATIENT
Start: 2024-08-07 | End: 2024-08-09 | Stop reason: HOSPADM

## 2024-08-07 RX ORDER — HYDROCODONE BITARTRATE AND ACETAMINOPHEN 500; 5 MG/1; MG/1
TABLET ORAL
Status: DISCONTINUED | OUTPATIENT
Start: 2024-08-07 | End: 2024-08-09 | Stop reason: HOSPADM

## 2024-08-07 RX ORDER — ALPRAZOLAM 0.5 MG/1
0.5 TABLET ORAL 3 TIMES DAILY PRN
Status: DISCONTINUED | OUTPATIENT
Start: 2024-08-07 | End: 2024-08-09 | Stop reason: HOSPADM

## 2024-08-07 RX ORDER — POTASSIUM CHLORIDE 20 MEQ/1
40 TABLET, EXTENDED RELEASE ORAL ONCE
Status: COMPLETED | OUTPATIENT
Start: 2024-08-07 | End: 2024-08-07

## 2024-08-07 RX ADMIN — IPRATROPIUM BROMIDE AND ALBUTEROL SULFATE 3 ML: .5; 3 SOLUTION RESPIRATORY (INHALATION) at 01:08

## 2024-08-07 RX ADMIN — HYDROCODONE BITARTRATE AND ACETAMINOPHEN 1 TABLET: 5; 325 TABLET ORAL at 09:08

## 2024-08-07 RX ADMIN — APIXABAN 5 MG: 5 TABLET, FILM COATED ORAL at 09:08

## 2024-08-07 RX ADMIN — SODIUM CHLORIDE, PRESERVATIVE FREE 10 ML: 5 INJECTION INTRAVENOUS at 03:08

## 2024-08-07 RX ADMIN — MEROPENEM 1 G: 1 INJECTION, POWDER, FOR SOLUTION INTRAVENOUS at 07:08

## 2024-08-07 RX ADMIN — DONEPEZIL HYDROCHLORIDE 10 MG: 5 TABLET, FILM COATED ORAL at 09:08

## 2024-08-07 RX ADMIN — DILTIAZEM HYDROCHLORIDE 60 MG: 60 TABLET, FILM COATED ORAL at 09:08

## 2024-08-07 RX ADMIN — POTASSIUM CHLORIDE 40 MEQ: 1500 TABLET, EXTENDED RELEASE ORAL at 10:08

## 2024-08-07 RX ADMIN — HYDROCORTISONE 10 MG: 5 TABLET ORAL at 09:08

## 2024-08-07 RX ADMIN — HYDROCODONE BITARTRATE AND ACETAMINOPHEN 1 TABLET: 5; 325 TABLET ORAL at 10:08

## 2024-08-07 RX ADMIN — IPRATROPIUM BROMIDE AND ALBUTEROL SULFATE 3 ML: .5; 3 SOLUTION RESPIRATORY (INHALATION) at 07:08

## 2024-08-07 RX ADMIN — HYDROCODONE BITARTRATE AND ACETAMINOPHEN 1 TABLET: 5; 325 TABLET ORAL at 04:08

## 2024-08-07 RX ADMIN — ALPRAZOLAM 0.5 MG: 0.5 TABLET ORAL at 11:08

## 2024-08-07 RX ADMIN — MEROPENEM 1 G: 1 INJECTION, POWDER, FOR SOLUTION INTRAVENOUS at 09:08

## 2024-08-07 RX ADMIN — SODIUM CHLORIDE, PRESERVATIVE FREE 10 ML: 5 INJECTION INTRAVENOUS at 05:08

## 2024-08-07 RX ADMIN — INSULIN ASPART 2 UNITS: 100 INJECTION, SOLUTION INTRAVENOUS; SUBCUTANEOUS at 12:08

## 2024-08-07 RX ADMIN — ALPRAZOLAM 0.5 MG: 0.5 TABLET ORAL at 09:08

## 2024-08-07 RX ADMIN — FAMOTIDINE 20 MG: 20 TABLET ORAL at 09:08

## 2024-08-07 RX ADMIN — MEMANTINE HYDROCHLORIDE 10 MG: 5 TABLET ORAL at 09:08

## 2024-08-07 RX ADMIN — SODIUM CHLORIDE, POTASSIUM CHLORIDE, SODIUM LACTATE AND CALCIUM CHLORIDE: 600; 310; 30; 20 INJECTION, SOLUTION INTRAVENOUS at 04:08

## 2024-08-07 RX ADMIN — DILTIAZEM HYDROCHLORIDE 60 MG: 60 TABLET, FILM COATED ORAL at 03:08

## 2024-08-07 RX ADMIN — ACETAMINOPHEN 650 MG: 325 TABLET, FILM COATED ORAL at 12:08

## 2024-08-07 RX ADMIN — INSULIN ASPART 2 UNITS: 100 INJECTION, SOLUTION INTRAVENOUS; SUBCUTANEOUS at 05:08

## 2024-08-07 RX ADMIN — SODIUM CHLORIDE, PRESERVATIVE FREE 10 ML: 5 INJECTION INTRAVENOUS at 12:08

## 2024-08-07 RX ADMIN — CHOLESTYRAMINE POWDER FOR SUSPENSION 4 G: 4 POWDER, FOR SUSPENSION ORAL at 09:08

## 2024-08-07 RX ADMIN — DILTIAZEM HYDROCHLORIDE 60 MG: 60 TABLET, FILM COATED ORAL at 07:08

## 2024-08-07 RX ADMIN — MUPIROCIN 1 G: 20 OINTMENT TOPICAL at 09:08

## 2024-08-07 RX ADMIN — INSULIN ASPART 2 UNITS: 100 INJECTION, SOLUTION INTRAVENOUS; SUBCUTANEOUS at 07:08

## 2024-08-07 RX ADMIN — MEROPENEM 1 G: 1 INJECTION, POWDER, FOR SOLUTION INTRAVENOUS at 01:08

## 2024-08-07 RX ADMIN — SODIUM CHLORIDE, PRESERVATIVE FREE 10 ML: 5 INJECTION INTRAVENOUS at 09:08

## 2024-08-07 RX ADMIN — CITALOPRAM HYDROBROMIDE 30 MG: 20 TABLET ORAL at 09:08

## 2024-08-07 NOTE — PLAN OF CARE
Problem: Adult Inpatient Plan of Care  Goal: Plan of Care Review  Outcome: Progressing  Goal: Patient-Specific Goal (Individualized)  Outcome: Progressing  Goal: Absence of Hospital-Acquired Illness or Injury  Outcome: Progressing  Goal: Optimal Comfort and Wellbeing  Outcome: Progressing  Goal: Readiness for Transition of Care  Outcome: Progressing     Problem: Diabetes Comorbidity  Goal: Blood Glucose Level Within Targeted Range  Outcome: Progressing     Problem: Acute Kidney Injury/Impairment  Goal: Fluid and Electrolyte Balance  Outcome: Progressing  Goal: Improved Oral Intake  Outcome: Progressing  Goal: Effective Renal Function  Outcome: Progressing     Problem: Fall Injury Risk  Goal: Absence of Fall and Fall-Related Injury  Outcome: Progressing     Problem: Infection  Goal: Absence of Infection Signs and Symptoms  Outcome: Progressing     Problem: Skin Injury Risk Increased  Goal: Skin Health and Integrity  Outcome: Progressing

## 2024-08-07 NOTE — PROGRESS NOTES
[facility] Medicine  Progress Note    Patient Name: Thu Branch  MRN: [id number]  Patient Class: IP- Inpatient   Admission Date: 8/2/2024  Length of Stay: 5 days  Attending Physician: Ernesto Mehta MD  Primary Care Provider: Joseph Vides MD        Subjective:     Principal Problem:Acute kidney injury    Interval History:   HPI: Ms. Branch is a 73-year-old female, NH patient was transferred to the ED earlier today due to staff noting change in her mental status as well as hypoxia as noted on room air saturations.  Her reported room air saturation there was 88%.  In the ED while on RA O2 saturation noted to fall into the mid 80s.  With 2 L NC O2 saturations improved into the mid-to-high 90s.  Evaluation included CT head which was unremarkable for acute changes, CXR was thought to show RLL infiltrate and routine lab work which revealed leukocytosis and elevated renal indices consistent with GADIEL.  While in the ED was also noted to have 2 episodes of diarrhea following which a rectal tube was inserted.  Urinalysis was ordered but not collected in the ED. urinary catheter was placed following the patient's arrival to the /surge unit.  On my seeing her was awake and answered questions but did not initiate conversation.  Denied feeling short of breath, denied pain.  She did not know why she was sent to the emergency department.    8/3-when seen on rounds today appeared comfortable; she was very sleepy, awakened to light touch and would answer questions with 1 or 2 word answers but quickly fall back to sleep when not stimulated; continues to have watery stools    8/4-she was much more alert and interactive when seen on rounds today; mental status is back to her baseline; she had no complaints    8/5-there were no issues overnight; condition remained stable; she had no complaints when seen on rounds today; she remains in AFib with CVR    August 6,  2024-complain of large amount of bile colored diarrhea through rectal tube, no nausea, no vomiting, no abdominal pain    August 7, 2024-complain of diarrhea and abdominal pain, hemoglobin dropped to 6.8 from 9.1, no blood in the stool  Objective:     Vital Signs (Most Recent):  Temp: 97.7 °F (36.5 °C) (08/07/24 0820)  Pulse: (!) 126 (08/07/24 0820)  Resp: 20 (08/07/24 1012)  BP: (!) 172/96 (08/07/24 0820)  SpO2: 100 % (08/07/24 0820) Vital Signs (24h Range):  Temp:  [97.6 °F (36.4 °C)-98 °F (36.7 °C)] 97.7 °F (36.5 °C)  Pulse:  [] 126  Resp:  [16-20] 20  SpO2:  [91 %-100 %] 100 %  BP: (134-173)/(72-96) 172/96     Weight: 96.5 kg (212 lb 11.9 oz)  Body mass index is 38.91 kg/m².    Intake/Output Summary (Last 24 hours) at 8/7/2024 1105  Last data filed at 8/7/2024 0924  Gross per 24 hour   Intake 2404.67 ml   Output 1250 ml   Net 1154.67 ml      Physical exam  Constitution-obese, normally developed female in NAD  Eyes-PERRL, EOMI  HENT-normocephalic, atraumatic  Neck-supple  Respiratory-normal respirations; CTA with good air movement  Heart-irregularly irregular rhythm, normal rate; normal S1 and S2; no murmurs, gallops  Abdomen-soft, nontender, nondistended  Genitourinary-deferred  Musculoskeletal-no joint abnormalities, normal ROM throughout  Skin-warm, dry; no rashes  Neurologic-alert, oriented x3; she is essentially functionally quadriplegic    Scheduled Meds:   albuterol-ipratropium  3 mL Nebulization Q6H    apixaban  5 mg Oral BID    cholestyramine  1 packet Oral BID    citalopram  30 mg Oral Daily    diltiaZEM  60 mg Oral Q8H    donepeziL  10 mg Oral BID    famotidine  20 mg Oral Daily    hydrocortisone  10 mg Oral BID    memantine  10 mg Oral BID    meropenem IV (PEDS and ADULTS)  1 g Intravenous Q8H    sodium chloride 0.9%  10 mL Intravenous Q6H     Continuous Infusions:   lactated ringers   Intravenous Continuous   Stopped at 08/07/24 0707     PRN Meds:.  Current Facility-Administered Medications:      "0.9%  NaCl infusion (for blood administration), , Intravenous, Q24H PRN    acetaminophen, 650 mg, Oral, Q4H PRN    acetaminophen, 650 mg, Oral, Q8H PRN    ALPRAZolam, 0.5 mg, Oral, TID PRN    bismuth subsalicylate, 30 mL, Oral, Q6H PRN    dextrose 10%, 12.5 g, Intravenous, PRN    dextrose 10%, 25 g, Intravenous, PRN    diphenoxylate-atropine 2.5-0.025 mg, 1 tablet, Oral, QID PRN    glucagon (human recombinant), 1 mg, Intramuscular, PRN    glucose, 16 g, Oral, PRN    glucose, 24 g, Oral, PRN    HYDROcodone-acetaminophen, 1 tablet, Oral, Q4H PRN    insulin aspart U-100, 0-10 Units, Subcutaneous, QID (AC + HS) PRN    metoprolol, 5 mg, Intravenous, Q2H PRN    naloxone, 0.02 mg, Intravenous, PRN    sodium chloride 0.9%, 10 mL, Intravenous, Q12H PRN    Flushing PICC/Midline Protocol, , , Until Discontinued **AND** sodium chloride 0.9%, 10 mL, Intravenous, Q6H **AND** sodium chloride 0.9%, 10 mL, Intravenous, PRN    Significant Labs: All pertinent labs within the past 24 hours have been reviewed.  Blood Culture: No results for input(s): "LABBLOO" in the last 48 hours.  CBC:   Recent Labs   Lab 08/07/24  0515   WBC 12.80*   HGB 6.8*   HCT 22.4*          CMP:   Recent Labs   Lab 08/06/24  0425 08/07/24  0515    144   K 2.6* 2.8*   * 113*   CO2 23 24   BUN 23.0* 24.0*   CREATININE 0.67 0.67   CALCIUM 9.4 9.3     Magnesium:   Recent Labs   Lab 08/06/24  0425   MG 1.90     POCT Glucose:   Recent Labs   Lab 08/06/24  1957 08/07/24  0054 08/07/24  0710   POCTGLUCOSE 160* 173* 167*     Urine Culture:   No results for input(s): "LABURIN" in the last 48 hours.    Urine Studies:   No results for input(s): "COLORU", "APPEARANCEUA", "PHUR", "SPECGRAV", "PROTEINUA", "GLUCUA", "KETONESU", "BILIRUBINUA", "OCCULTUA", "NITRITE", "UROBILINOGEN", "LEUKOCYTESUR", "RBCUA", "WBCUA", "BACTERIA", "SQUAMEPITHEL", "HYALINECASTS" in the last 48 hours.    Invalid input(s): "WRIGHTSUR"      Significant Imaging:   CXR  Impression:  1. " Mild cardiomegaly  2. Atherosclerosis  3. Atelectasis and/or scarring left lower lung    Assessment/Plan:   Metabolic encephalopathy  Multifactorial; patient had a UTI, possible pneumonia, volume depletion and GADIEL all of which contributed to altered mental status  Resolved; mental status at baseline     Acute kidney injury/CKD, stage IIIB  Likely due to volume depletion; exacerbated by ARB  Renal indices normalizing  Avoid nephrotoxic agents/renal dose medications  Discontinue urinary catheter  Continue IV hydration     UTI  Urine culture-ESBL+ Klebsiella pneumoniae; sensitivity pattern different than organism in blood; Anticipate  Continue IV ertapenem    discharge  on ertapenem 1 g IM daily 7 days to complete 10 days therapy    Gram-negative (Klebsiella) sepsis  BC's 2/2 growing ESBL+ Klebsiella pneumoniae; sensitivities noted  WBC now nl  As noted above patient will receive ertapenem dose today with plans to discharge on IM ertapenem     Possible RLL pneumonia/hypoxemia  Bronchodilator therapy, supplemental O2 as needed as patient hypoxic on room air  O2 saturation on 3 L %  Continue current antibiotics  Procalcitonin 25.5, markedly elevated     Diarrhea  Etiology: Infectious versus iatrogenic (patient was on multiple laxative type medications as well as Linzess); these were discontinued  C diff studies was negative  GI panel negative  Diarrhea persists  Add Questran and banana flakes; give single   Continue Pepto-Bismol and Lomotil  Continue rectal tube until stools become formed    AFib with RVR  Patient developed AFib with RVR during the day on 8/4 and responded with good rate control to IV diltiazem; was maintained on diltiazem drip overnight but was discontinued early this morning due to bradycardia (rates dropped into the 50s)  She remains in AFib; she has maintained good blood pressures  Echo, 03/08/2024-EF 65-70% with grade 1 DD; normal left atrial size  Patient started on oral diltiazem 60 mg q.8  hours this morning; can be converted to diltiazem  mg daily at discharge  CHADS-VASc 5-OAC indicated; hold apixaban 5 mg b.i.d. secondary to anemia     Diabetes mellitus  Monitor blood sugars with Accu-Cheks and SSI  Add basal insulin as necessary     Hypertension  Blood pressure rising  Cont amlodipine 5 mg daily  Consider resuming ARB at discharge at decreased dose as diltiazem has been added to regimen    Hypokalemia  Supplement as necessary     Patient on chronic hydrocortisone  Taper dose back to 5 mg daily dosing at discharge    Extreme debility  Patient is bed ridden, requires total care, functionally quadriplegic  BSE revealed minimal aspiration; small bites with gravy and thin liquids recommended  The patient is on DNR comfort measures only,   patient is from nursing     Anemia-transfuse 1 unit of packed RBC      GI prophylaxis: Famotidine     Code status: DNR   Active Diagnoses:    Diagnosis Date Noted POA    PRINCIPAL PROBLEM:  Acute kidney injury [N17.9] 05/12/2024 Yes      Problems Resolved During this Admission:     VTE Risk Mitigation (From admission, onward)           Ordered     apixaban tablet 5 mg  2 times daily         08/05/24 0758     IP VTE HIGH RISK PATIENT  Once         08/02/24 1456                       Jad Rock MD  Department of Hospital Medicine   [facility]

## 2024-08-08 LAB
ANION GAP SERPL CALC-SCNC: 7 MEQ/L
BASOPHILS # BLD AUTO: 0.05 X10(3)/MCL
BASOPHILS NFR BLD AUTO: 0.3 %
BUN SERPL-MCNC: 21 MG/DL (ref 9.8–20.1)
CALCIUM SERPL-MCNC: 9.1 MG/DL (ref 8.4–10.2)
CHLORIDE SERPL-SCNC: 113 MMOL/L (ref 98–107)
CO2 SERPL-SCNC: 25 MMOL/L (ref 23–31)
CREAT SERPL-MCNC: 0.71 MG/DL (ref 0.55–1.02)
CREAT/UREA NIT SERPL: 30
EOSINOPHIL # BLD AUTO: 0.29 X10(3)/MCL (ref 0–0.9)
EOSINOPHIL NFR BLD AUTO: 2 %
ERYTHROCYTE [DISTWIDTH] IN BLOOD BY AUTOMATED COUNT: 15.4 % (ref 11.5–17)
GFR SERPLBLD CREATININE-BSD FMLA CKD-EPI: >60 ML/MIN/1.73/M2
GLUCOSE SERPL-MCNC: 147 MG/DL (ref 82–115)
HCT VFR BLD AUTO: 26 % (ref 37–47)
HGB BLD-MCNC: 8.1 G/DL (ref 12–16)
IMM GRANULOCYTES # BLD AUTO: 0.67 X10(3)/MCL (ref 0–0.04)
IMM GRANULOCYTES NFR BLD AUTO: 4.6 %
LYMPHOCYTES # BLD AUTO: 2.11 X10(3)/MCL (ref 0.6–4.6)
LYMPHOCYTES NFR BLD AUTO: 14.5 %
MCH RBC QN AUTO: 26.4 PG (ref 27–31)
MCHC RBC AUTO-ENTMCNC: 31.2 G/DL (ref 33–36)
MCV RBC AUTO: 84.7 FL (ref 80–94)
MONOCYTES # BLD AUTO: 0.93 X10(3)/MCL (ref 0.1–1.3)
MONOCYTES NFR BLD AUTO: 6.4 %
NEUTROPHILS # BLD AUTO: 10.5 X10(3)/MCL (ref 2.1–9.2)
NEUTROPHILS NFR BLD AUTO: 72.2 %
PLATELET # BLD AUTO: 358 X10(3)/MCL (ref 130–400)
PMV BLD AUTO: 11 FL (ref 7.4–10.4)
POCT GLUCOSE: 121 MG/DL (ref 70–110)
POCT GLUCOSE: 121 MG/DL (ref 70–110)
POCT GLUCOSE: 169 MG/DL (ref 70–110)
POTASSIUM SERPL-SCNC: 3.3 MMOL/L (ref 3.5–5.1)
RBC # BLD AUTO: 3.07 X10(6)/MCL (ref 4.2–5.4)
SODIUM SERPL-SCNC: 145 MMOL/L (ref 136–145)
WBC # BLD AUTO: 14.55 X10(3)/MCL (ref 4.5–11.5)

## 2024-08-08 PROCEDURE — 99900035 HC TECH TIME PER 15 MIN (STAT)

## 2024-08-08 PROCEDURE — 94761 N-INVAS EAR/PLS OXIMETRY MLT: CPT

## 2024-08-08 PROCEDURE — 36415 COLL VENOUS BLD VENIPUNCTURE: CPT | Performed by: INTERNAL MEDICINE

## 2024-08-08 PROCEDURE — 21400001 HC TELEMETRY ROOM

## 2024-08-08 PROCEDURE — 63600175 PHARM REV CODE 636 W HCPCS: Mod: JA | Performed by: INTERNAL MEDICINE

## 2024-08-08 PROCEDURE — 25000003 PHARM REV CODE 250: Performed by: EMERGENCY MEDICINE

## 2024-08-08 PROCEDURE — 25000003 PHARM REV CODE 250: Performed by: INTERNAL MEDICINE

## 2024-08-08 PROCEDURE — 63600175 PHARM REV CODE 636 W HCPCS: Performed by: EMERGENCY MEDICINE

## 2024-08-08 PROCEDURE — A4216 STERILE WATER/SALINE, 10 ML: HCPCS | Performed by: EMERGENCY MEDICINE

## 2024-08-08 PROCEDURE — 25000242 PHARM REV CODE 250 ALT 637 W/ HCPCS: Performed by: EMERGENCY MEDICINE

## 2024-08-08 PROCEDURE — 63600175 PHARM REV CODE 636 W HCPCS: Performed by: INTERNAL MEDICINE

## 2024-08-08 PROCEDURE — 85025 COMPLETE CBC W/AUTO DIFF WBC: CPT | Performed by: INTERNAL MEDICINE

## 2024-08-08 PROCEDURE — 94640 AIRWAY INHALATION TREATMENT: CPT

## 2024-08-08 PROCEDURE — 80048 BASIC METABOLIC PNL TOTAL CA: CPT | Performed by: INTERNAL MEDICINE

## 2024-08-08 PROCEDURE — 27000207 HC ISOLATION

## 2024-08-08 RX ORDER — HYDROMORPHONE HYDROCHLORIDE 2 MG/ML
1 INJECTION, SOLUTION INTRAMUSCULAR; INTRAVENOUS; SUBCUTANEOUS EVERY 6 HOURS PRN
Status: DISCONTINUED | OUTPATIENT
Start: 2024-08-08 | End: 2024-08-09 | Stop reason: HOSPADM

## 2024-08-08 RX ORDER — ZIPRASIDONE MESYLATE 20 MG/ML
20 INJECTION, POWDER, LYOPHILIZED, FOR SOLUTION INTRAMUSCULAR EVERY 12 HOURS PRN
Status: DISCONTINUED | OUTPATIENT
Start: 2024-08-08 | End: 2024-08-09 | Stop reason: HOSPADM

## 2024-08-08 RX ORDER — POTASSIUM CHLORIDE 7.45 MG/ML
40 INJECTION INTRAVENOUS
Status: ACTIVE | OUTPATIENT
Start: 2024-08-08 | End: 2024-08-08

## 2024-08-08 RX ORDER — POTASSIUM CHLORIDE 7.45 MG/ML
40 INJECTION INTRAVENOUS ONCE
Status: COMPLETED | OUTPATIENT
Start: 2024-08-08 | End: 2024-08-08

## 2024-08-08 RX ADMIN — SODIUM CHLORIDE, PRESERVATIVE FREE 10 ML: 5 INJECTION INTRAVENOUS at 12:08

## 2024-08-08 RX ADMIN — DILTIAZEM HYDROCHLORIDE 60 MG: 60 TABLET, FILM COATED ORAL at 02:08

## 2024-08-08 RX ADMIN — DILTIAZEM HYDROCHLORIDE 60 MG: 60 TABLET, FILM COATED ORAL at 05:08

## 2024-08-08 RX ADMIN — OCTREOTIDE ACETATE 50 MCG/HR: 500 INJECTION, SOLUTION INTRAVENOUS; SUBCUTANEOUS at 04:08

## 2024-08-08 RX ADMIN — MEMANTINE HYDROCHLORIDE 10 MG: 5 TABLET ORAL at 09:08

## 2024-08-08 RX ADMIN — OCTREOTIDE ACETATE 50 MCG/HR: 500 INJECTION, SOLUTION INTRAVENOUS; SUBCUTANEOUS at 09:08

## 2024-08-08 RX ADMIN — CHOLESTYRAMINE POWDER FOR SUSPENSION 4 G: 4 POWDER, FOR SUSPENSION ORAL at 09:08

## 2024-08-08 RX ADMIN — SODIUM CHLORIDE, PRESERVATIVE FREE 10 ML: 5 INJECTION INTRAVENOUS at 06:08

## 2024-08-08 RX ADMIN — POTASSIUM CHLORIDE 10 MEQ: 7.46 INJECTION, SOLUTION INTRAVENOUS at 01:08

## 2024-08-08 RX ADMIN — IPRATROPIUM BROMIDE AND ALBUTEROL SULFATE 3 ML: .5; 3 SOLUTION RESPIRATORY (INHALATION) at 07:08

## 2024-08-08 RX ADMIN — ALPRAZOLAM 0.5 MG: 0.5 TABLET ORAL at 04:08

## 2024-08-08 RX ADMIN — HYDROCORTISONE 10 MG: 5 TABLET ORAL at 09:08

## 2024-08-08 RX ADMIN — DONEPEZIL HYDROCHLORIDE 10 MG: 5 TABLET, FILM COATED ORAL at 09:08

## 2024-08-08 RX ADMIN — APIXABAN 5 MG: 5 TABLET, FILM COATED ORAL at 09:08

## 2024-08-08 RX ADMIN — ALPRAZOLAM 0.5 MG: 0.5 TABLET ORAL at 06:08

## 2024-08-08 RX ADMIN — MEROPENEM 1 G: 1 INJECTION, POWDER, FOR SOLUTION INTRAVENOUS at 05:08

## 2024-08-08 RX ADMIN — SODIUM CHLORIDE, POTASSIUM CHLORIDE, SODIUM LACTATE AND CALCIUM CHLORIDE: 600; 310; 30; 20 INJECTION, SOLUTION INTRAVENOUS at 05:08

## 2024-08-08 RX ADMIN — DILTIAZEM HYDROCHLORIDE 60 MG: 60 TABLET, FILM COATED ORAL at 09:08

## 2024-08-08 RX ADMIN — FAMOTIDINE 20 MG: 20 TABLET ORAL at 09:08

## 2024-08-08 RX ADMIN — POTASSIUM CHLORIDE 10 MEQ: 7.46 INJECTION, SOLUTION INTRAVENOUS at 04:08

## 2024-08-08 RX ADMIN — METOPROLOL TARTRATE 5 MG: 1 INJECTION, SOLUTION INTRAVENOUS at 11:08

## 2024-08-08 RX ADMIN — POTASSIUM CHLORIDE 10 MEQ: 7.46 INJECTION, SOLUTION INTRAVENOUS at 02:08

## 2024-08-08 RX ADMIN — HYDROCODONE BITARTRATE AND ACETAMINOPHEN 1 TABLET: 5; 325 TABLET ORAL at 05:08

## 2024-08-08 RX ADMIN — HYDROCODONE BITARTRATE AND ACETAMINOPHEN 1 TABLET: 5; 325 TABLET ORAL at 10:08

## 2024-08-08 RX ADMIN — MEROPENEM 1 G: 1 INJECTION, POWDER, FOR SOLUTION INTRAVENOUS at 01:08

## 2024-08-08 RX ADMIN — POTASSIUM CHLORIDE 10 MEQ: 7.46 INJECTION, SOLUTION INTRAVENOUS at 12:08

## 2024-08-08 RX ADMIN — IPRATROPIUM BROMIDE AND ALBUTEROL SULFATE 3 ML: .5; 3 SOLUTION RESPIRATORY (INHALATION) at 01:08

## 2024-08-08 RX ADMIN — MEROPENEM 1 G: 1 INJECTION, POWDER, FOR SOLUTION INTRAVENOUS at 09:08

## 2024-08-08 RX ADMIN — HYDROMORPHONE HYDROCHLORIDE 1 MG: 2 INJECTION, SOLUTION INTRAMUSCULAR; INTRAVENOUS; SUBCUTANEOUS at 04:08

## 2024-08-08 RX ADMIN — CITALOPRAM HYDROBROMIDE 30 MG: 20 TABLET ORAL at 09:08

## 2024-08-08 RX ADMIN — METOPROLOL TARTRATE 5 MG: 1 INJECTION, SOLUTION INTRAVENOUS at 04:08

## 2024-08-08 NOTE — PLAN OF CARE
Problem: Adult Inpatient Plan of Care  Goal: Plan of Care Review  Outcome: Progressing  Goal: Patient-Specific Goal (Individualized)  Outcome: Progressing  Goal: Absence of Hospital-Acquired Illness or Injury  Outcome: Progressing  Goal: Optimal Comfort and Wellbeing  Outcome: Progressing  Goal: Readiness for Transition of Care  Outcome: Progressing     Problem: Diabetes Comorbidity  Goal: Blood Glucose Level Within Targeted Range  Outcome: Progressing     Problem: Acute Kidney Injury/Impairment  Goal: Fluid and Electrolyte Balance  Outcome: Progressing  Goal: Improved Oral Intake  Outcome: Progressing  Goal: Effective Renal Function  Outcome: Progressing     Problem: Infection  Goal: Absence of Infection Signs and Symptoms  Outcome: Progressing     Problem: Fall Injury Risk  Goal: Absence of Fall and Fall-Related Injury  Outcome: Progressing     Problem: Skin Injury Risk Increased  Goal: Skin Health and Integrity  Outcome: Progressing     Problem: Pneumonia  Goal: Fluid Balance  Outcome: Progressing  Goal: Resolution of Infection Signs and Symptoms  Outcome: Progressing  Goal: Effective Oxygenation and Ventilation  Outcome: Progressing     Problem: Gas Exchange Impaired  Goal: Optimal Gas Exchange  Outcome: Progressing     Problem: Functional Deficit  Goal: Improved Balance and Postural Control  Outcome: Progressing  Goal: Optimal Cognitive Function  Outcome: Progressing  Goal: Optimal Coordination  Outcome: Progressing  Goal: Improved Muscle Strength  Outcome: Progressing  Goal: Improved Muscle Tone  Outcome: Progressing  Goal: Optimal Range of Motion  Outcome: Progressing  Goal: Compensation for Sensory Deficit  Outcome: Progressing     Problem: Dysrhythmia  Goal: Normalized Cardiac Rhythm  Outcome: Progressing      Joby [address] Dr Dosing Services: Antimicrobial Stewardship Daily Doc 10/5/2022    Consult for antibiotic dosing of Vancomycin by Dr. Yumiko Rhodes  Indication: HAP  Day of Therapy: 2  - Recent hospitalization 8/21-9/21/22 s/p 18 days IV abx     Ht Readings from Last 1 Encounters:   10/05/22 147.3 cm (58\")        Wt Readings from Last 1 Encounters:   10/05/22 50.8 kg (111 lb 15.9 oz)      Vancomycin therapy:  Current maintenance dose: S/p 1250 mg loading dose  Last level: Initial dosing  Dose calculated to approximate a           a. Target AUC/STEVE of 400-600          b. Trough of N/A mcg/mL     Plan: Noted vancomycin dosing history- Recent supratherapeutic level of 23.1 mcg/ml on 8/30/22 after 500 mg Q8 hrs maintenance dose. Given history will start 1250 mg x 1 followed by 750 mg (~15 mg/kg) Q12 hrs for anticipated therapeutic level. Will draw Css trough tomorrow AM.   Dose administration notes:   Doses given appropriately as scheduled    Date Dose & Interval Measured (mcg/mL) Extrapolated (mcg/mL)   ? ? ? ?   ? ? ? ?   ? ? ? ? Non-Kinetic Antimicrobial Dosing Regimen:   Current Regimen:  Zosyn 3.375 g Q8 hrs  Recommendation: Dose appropriate per EI protocol  Dose administration notes:   Doses given appropriately as scheduled    Other Antimicrobial   (not dosed by pharmacist) None   Cultures 10/5 Blood: NGTD, Prelim  10/5 Sputum: Not done- RN drawing after rounds  10/4 Blood (Paired): NGTD, Prelim    Previous Cultures:   9/14 Sputum: Moderate Klebsiella Pneumoniae (R ampicillin, S other abx); Moderate Pseudomonas Aeruginosa (Pansensitive); Moderate Stenotrophomonas (unclear if colonizer)   Serum Creatinine Lab Results   Component Value Date/Time    Creatinine 0.39 (L) 10/04/2022 05:35 PM         Creatinine Clearance Estimated Creatinine Clearance: 92.1 mL/min (A) (by C-G formula based on SCr of 0.39 mg/dL (L)).      Temp Temp: (!) 100.6 °F (38.1 °C)       WBC Lab Results   Component Value Date/Time    WBC 10.5 10/04/2022 05:35 PM        Procalcitonin Lab Results   Component Value Date/Time    Procalcitonin 3.62 09/12/2022 05:43 AM        For Antifungals, Metronidazole and Nafcillin: Lab Results   Component Value Date/Time    ALT (SGPT) 58 10/04/2022 05:35 PM    AST (SGOT) 60 (H) 10/04/2022 05:35 PM    Alk.  phosphatase 229 (H) 10/04/2022 05:35 PM    Bilirubin, total 1.3 (H) 10/04/2022 05:35 PM        Thanks,  Pharmacist Sushma Lara, PHARMD

## 2024-08-08 NOTE — PROGRESS NOTES
[facility] - Medical Surgical [facility] Medicine  Progress Note    Patient Name: Thu Branch  MRN: [id number]  Patient Class: IP- Inpatient   Admission Date: 8/2/2024  Length of Stay: 6 days  Attending Physician: Ernesto Mehta MD  Primary Care Provider: Joseph Vides MD        Subjective:     Principal Problem:Acute kidney injury    Interval History:   HPI: Ms. Branch is a 73-year-old female, NH patient was transferred to the ED earlier today due to staff noting change in her mental status as well as hypoxia as noted on room air saturations.  Her reported room air saturation there was 88%.  In the ED while on RA O2 saturation noted to fall into the mid 80s.  With 2 L NC O2 saturations improved into the mid-to-high 90s.  Evaluation included CT head which was unremarkable for acute changes, CXR was thought to show RLL infiltrate and routine lab work which revealed leukocytosis and elevated renal indices consistent with GADIEL.  While in the ED was also noted to have 2 episodes of diarrhea following which a rectal tube was inserted.  Urinalysis was ordered but not collected in the ED. urinary catheter was placed following the patient's arrival to the /surge unit.  On my seeing her was awake and answered questions but did not initiate conversation.  Denied feeling short of breath, denied pain.  She did not know why she was sent to the emergency department.    8/3-when seen on rounds today appeared comfortable; she was very sleepy, awakened to light touch and would answer questions with 1 or 2 word answers but quickly fall back to sleep when not stimulated; continues to have watery stools    8/4-she was much more alert and interactive when seen on rounds today; mental status is back to her baseline; she had no complaints    8/5-there were no issues overnight; condition remained stable; she had no complaints when seen on rounds today; she remains in AFib with CVR    August 6,  2024-complain of large amount of bile colored diarrhea through rectal tube, no nausea, no vomiting, no abdominal pain    August 7, 2024-complain of diarrhea and abdominal pain, hemoglobin dropped to 6.8 from 9.1, no blood in the stool    August 8, 2024-complain of loose stool, no fever, no shortness for breath, no nausea, hemoglobin improved to 8.1 from 6.8 after 1 unit of packed RBC yesterday  Objective:     Vital Signs (Most Recent):  Temp: 97.7 °F (36.5 °C) (08/08/24 0832)  Pulse: 77 (08/08/24 0832)  Resp: (!) 22 (08/08/24 0832)  BP: (!) 184/82 (reported b/p to nurse) (08/08/24 0832)  SpO2: 97 % (08/08/24 0832) Vital Signs (24h Range):  Temp:  [97.3 °F (36.3 °C)-98.3 °F (36.8 °C)] 97.7 °F (36.5 °C)  Pulse:  [] 77  Resp:  [16-22] 22  SpO2:  [76 %-100 %] 97 %  BP: ()/(55-91) 184/82     Weight: 96.5 kg (212 lb 11.9 oz)  Body mass index is 38.91 kg/m².    Intake/Output Summary (Last 24 hours) at 8/8/2024 1016  Last data filed at 8/8/2024 0954  Gross per 24 hour   Intake 735 ml   Output 1150 ml   Net -415 ml      Physical exam  Constitution-obese, normally developed female in NAD  Eyes-PERRL, EOMI  HENT-normocephalic, atraumatic  Neck-supple  Respiratory-normal respirations; CTA with good air movement  Heart-irregularly irregular rhythm, normal rate; normal S1 and S2; no murmurs, gallops  Abdomen-soft, nontender, nondistended  Genitourinary-deferred  Musculoskeletal-no joint abnormalities, normal ROM throughout  Skin-warm, dry; no rashes  Neurologic-alert, oriented x3; she is essentially functionally quadriplegic    Scheduled Meds:   albuterol-ipratropium  3 mL Nebulization Q6H    apixaban  5 mg Oral BID    cholestyramine  1 packet Oral BID    citalopram  30 mg Oral Daily    diltiaZEM  60 mg Oral Q8H    donepeziL  10 mg Oral BID    famotidine  20 mg Oral Daily    hydrocortisone  10 mg Oral BID    memantine  10 mg Oral BID    meropenem IV (PEDS and ADULTS)  1 g Intravenous Q8H    sodium chloride 0.9%  10 mL  "Intravenous Q6H     Continuous Infusions:   lactated ringers   Intravenous Continuous 50 mL/hr at 08/08/24 0507 New Bag at 08/08/24 0507     PRN Meds:.  Current Facility-Administered Medications:     0.9%  NaCl infusion (for blood administration), , Intravenous, Q24H PRN    acetaminophen, 650 mg, Oral, Q4H PRN    acetaminophen, 650 mg, Oral, Q8H PRN    ALPRAZolam, 0.5 mg, Oral, TID PRN    bismuth subsalicylate, 30 mL, Oral, Q6H PRN    dextrose 10%, 12.5 g, Intravenous, PRN    dextrose 10%, 25 g, Intravenous, PRN    diphenoxylate-atropine 2.5-0.025 mg, 1 tablet, Oral, QID PRN    glucagon (human recombinant), 1 mg, Intramuscular, PRN    glucose, 16 g, Oral, PRN    glucose, 24 g, Oral, PRN    HYDROcodone-acetaminophen, 1 tablet, Oral, Q4H PRN    insulin aspart U-100, 0-10 Units, Subcutaneous, QID (AC + HS) PRN    metoprolol, 5 mg, Intravenous, Q2H PRN    naloxone, 0.02 mg, Intravenous, PRN    sodium chloride 0.9%, 10 mL, Intravenous, Q12H PRN    Flushing PICC/Midline Protocol, , , Until Discontinued **AND** sodium chloride 0.9%, 10 mL, Intravenous, Q6H **AND** sodium chloride 0.9%, 10 mL, Intravenous, PRN    Significant Labs: All pertinent labs within the past 24 hours have been reviewed.  Blood Culture: No results for input(s): "LABBLOO" in the last 48 hours.  CBC:   Recent Labs   Lab 08/07/24  0515 08/08/24  0535   WBC 12.80* 14.55*   HGB 6.8* 8.1*   HCT 22.4* 26.0*    358       CMP:   Recent Labs   Lab 08/07/24  0515 08/08/24  0535    145   K 2.8* 3.3*   * 113*   CO2 24 25   BUN 24.0* 21.0*   CREATININE 0.67 0.71   CALCIUM 9.3 9.1     Magnesium:   No results for input(s): "MG" in the last 48 hours.    POCT Glucose:   Recent Labs   Lab 08/07/24  0710 08/07/24  1252 08/07/24  1703   POCTGLUCOSE 167* 172* 151*     Urine Culture:   No results for input(s): "LABURIN" in the last 48 hours.    Urine Studies:   No results for input(s): "COLORU", "APPEARANCEUA", "PHUR", "SPECGRAV", "PROTEINUA", "GLUCUA", " ""KETONESU", "BILIRUBINUA", "OCCULTUA", "NITRITE", "UROBILINOGEN", "LEUKOCYTESUR", "RBCUA", "WBCUA", "BACTERIA", "SQUAMEPITHEL", "HYALINECASTS" in the last 48 hours.    Invalid input(s): "WRIGHTSUR"      Significant Imaging:   CXR  Impression:  1. Mild cardiomegaly  2. Atherosclerosis  3. Atelectasis and/or scarring left lower lung    Assessment/Plan:   Metabolic encephalopathy  Multifactorial; patient had a UTI, possible pneumonia, volume depletion and GADIEL all of which contributed to altered mental status  Resolved; mental status at baseline     Acute kidney injury/CKD, stage IIIB  Likely due to volume depletion; exacerbated by ARB  Renal indices normalizing  Avoid nephrotoxic agents/renal dose medications  Discontinue urinary catheter  Continue IV hydration     UTI  Urine culture-ESBL+ Klebsiella pneumoniae; sensitivity pattern different than organism in blood; Anticipate  Continue IV meropenem   discharge  on ertapenem 1 g IM daily 7 days to complete 10 days therapy    Gram-negative (Klebsiella) sepsis  BC's 2/2 growing ESBL+ Klebsiella pneumoniae; sensitivities noted  WBC now nl  As noted above patient will receive ertapenem dose today with plans to discharge on IM ertapenem     Possible RLL pneumonia/hypoxemia  Bronchodilator therapy, supplemental O2 as needed as patient hypoxic on room air  O2 saturation on 3 L %  Continue current antibiotics  Procalcitonin 25.5, markedly elevated     Diarrhea  Etiology: Infectious versus iatrogenic (patient was on multiple laxative type medications as well as Linzess); these were discontinued  C diff studies was negative  GI panel negative  Diarrhea persists  Add Questran and banana flakes; give single   Continue Pepto-Bismol and Lomotil  Continue rectal tube until stools become formed  Try octreotide       AFib with RVR  Patient developed AFib with RVR during the day on 8/4 and responded with good rate control to IV diltiazem; was maintained on diltiazem drip overnight " but was discontinued early this morning due to bradycardia (rates dropped into the 50s)  She remains in AFib; she has maintained good blood pressures  Echo, 03/08/2024-EF 65-70% with grade 1 DD; normal left atrial size  Patient started on oral diltiazem 60 mg q.8 hours this morning; can be converted to diltiazem  mg daily at discharge  CHADS-VASc 5-OAC indicated; hold apixaban 5 mg b.i.d. secondary to anemia     Diabetes mellitus  Monitor blood sugars with Accu-Cheks and SSI  Add basal insulin as necessary     Hypertension  Blood pressure rising  Cont amlodipine 5 mg daily  Consider resuming ARB at discharge at decreased dose as diltiazem has been added to regimen    Hypokalemia  Supplement as necessary     Patient on chronic hydrocortisone  Taper dose back to 5 mg daily dosing at discharge    Extreme debility  Patient is bed ridden, requires total care, functionally quadriplegic  BSE revealed minimal aspiration; small bites with gravy and thin liquids recommended  The patient is on DNR comfort measures only,   patient is from nursing     Anemia-status post 1 unit of packed RBC August 7, 2024      GI prophylaxis: Famotidine     Code status: DNR   Active Diagnoses:    Diagnosis Date Noted POA    PRINCIPAL PROBLEM:  Acute kidney injury [N17.9] 05/12/2024 Yes      Problems Resolved During this Admission:     VTE Risk Mitigation (From admission, onward)           Ordered     apixaban tablet 5 mg  2 times daily         08/05/24 0758     IP VTE HIGH RISK PATIENT  Once         08/02/24 1456                       Jad Rock MD  Department of Hospital Medicine   [facility] - Medical Surgical Unit

## 2024-08-08 NOTE — PT/OT/SLP PROGRESS
Speech Language Pathology      Thu Branch  MRN: [id number]    ST attempted to follow-up, Pt not appropriate at this time. Nildaering out. Will re-attempt next session.     Bhargavi Talavera MS, CCC-SLP  08/08/2024

## 2024-08-09 ENCOUNTER — HOSPITAL ENCOUNTER (EMERGENCY)
Facility: HOSPITAL | Age: 74
Discharge: HOME | End: 2024-08-09
Attending: STUDENT IN AN ORGANIZED HEALTH CARE EDUCATION/TRAINING PROGRAM
Payer: MEDICARE

## 2024-08-09 VITALS
BODY MASS INDEX: 36.8 KG/M2 | OXYGEN SATURATION: 98 % | HEIGHT: 62 IN | TEMPERATURE: 98 F | RESPIRATION RATE: 18 BRPM | HEART RATE: 96 BPM | WEIGHT: 200 LBS | SYSTOLIC BLOOD PRESSURE: 144 MMHG | DIASTOLIC BLOOD PRESSURE: 84 MMHG

## 2024-08-09 VITALS
DIASTOLIC BLOOD PRESSURE: 92 MMHG | TEMPERATURE: 98 F | RESPIRATION RATE: 28 BRPM | HEIGHT: 62 IN | BODY MASS INDEX: 39.15 KG/M2 | WEIGHT: 212.75 LBS | SYSTOLIC BLOOD PRESSURE: 154 MMHG | OXYGEN SATURATION: 96 % | HEART RATE: 107 BPM

## 2024-08-09 DIAGNOSIS — Z51.5 ENCOUNTER FOR NURSING HOME HOSPICE CARE: Primary | ICD-10-CM

## 2024-08-09 DIAGNOSIS — R60.1 ANASARCA: ICD-10-CM

## 2024-08-09 DIAGNOSIS — R53.81 DECLINING FUNCTIONAL STATUS: ICD-10-CM

## 2024-08-09 LAB
ALBUMIN SERPL-MCNC: 2.7 G/DL (ref 3.4–4.8)
ALBUMIN/GLOB SERPL: 0.8 RATIO (ref 1.1–2)
ALP SERPL-CCNC: 53 UNIT/L (ref 40–150)
ALT SERPL-CCNC: 56 UNIT/L (ref 0–55)
ANION GAP SERPL CALC-SCNC: 7 MEQ/L
ANION GAP SERPL CALC-SCNC: 8 MEQ/L
AST SERPL-CCNC: 24 UNIT/L (ref 5–34)
BASOPHILS # BLD AUTO: 0.04 X10(3)/MCL
BASOPHILS # BLD AUTO: 0.06 X10(3)/MCL
BASOPHILS NFR BLD AUTO: 0.3 %
BASOPHILS NFR BLD AUTO: 0.3 %
BILIRUB SERPL-MCNC: 0.3 MG/DL
BUN SERPL-MCNC: 12 MG/DL (ref 9.8–20.1)
BUN SERPL-MCNC: 14 MG/DL (ref 9.8–20.1)
CALCIUM SERPL-MCNC: 8.9 MG/DL (ref 8.4–10.2)
CALCIUM SERPL-MCNC: 9.1 MG/DL (ref 8.4–10.2)
CHLORIDE SERPL-SCNC: 112 MMOL/L (ref 98–107)
CHLORIDE SERPL-SCNC: 113 MMOL/L (ref 98–107)
CO2 SERPL-SCNC: 22 MMOL/L (ref 23–31)
CO2 SERPL-SCNC: 26 MMOL/L (ref 23–31)
CREAT SERPL-MCNC: 0.67 MG/DL (ref 0.55–1.02)
CREAT SERPL-MCNC: 0.67 MG/DL (ref 0.55–1.02)
CREAT/UREA NIT SERPL: 18
CREAT/UREA NIT SERPL: 21
EOSINOPHIL # BLD AUTO: 0.26 X10(3)/MCL (ref 0–0.9)
EOSINOPHIL # BLD AUTO: 0.35 X10(3)/MCL (ref 0–0.9)
EOSINOPHIL NFR BLD AUTO: 1.8 %
EOSINOPHIL NFR BLD AUTO: 1.9 %
ERYTHROCYTE [DISTWIDTH] IN BLOOD BY AUTOMATED COUNT: 15.9 % (ref 11.5–17)
ERYTHROCYTE [DISTWIDTH] IN BLOOD BY AUTOMATED COUNT: 16.7 % (ref 11.5–17)
GFR SERPLBLD CREATININE-BSD FMLA CKD-EPI: >60 ML/MIN/1.73/M2
GFR SERPLBLD CREATININE-BSD FMLA CKD-EPI: >60 ML/MIN/1.73/M2
GLOBULIN SER-MCNC: 3.5 GM/DL (ref 2.4–3.5)
GLUCOSE SERPL-MCNC: 157 MG/DL (ref 82–115)
GLUCOSE SERPL-MCNC: 171 MG/DL (ref 82–115)
HCT VFR BLD AUTO: 25.6 % (ref 37–47)
HCT VFR BLD AUTO: 26.5 % (ref 37–47)
HGB BLD-MCNC: 7.7 G/DL (ref 12–16)
HGB BLD-MCNC: 8.3 G/DL (ref 12–16)
IMM GRANULOCYTES # BLD AUTO: 0.37 X10(3)/MCL (ref 0–0.04)
IMM GRANULOCYTES # BLD AUTO: 0.54 X10(3)/MCL (ref 0–0.04)
IMM GRANULOCYTES NFR BLD AUTO: 2.6 %
IMM GRANULOCYTES NFR BLD AUTO: 2.9 %
LYMPHOCYTES # BLD AUTO: 1.3 X10(3)/MCL (ref 0.6–4.6)
LYMPHOCYTES # BLD AUTO: 1.39 X10(3)/MCL (ref 0.6–4.6)
LYMPHOCYTES NFR BLD AUTO: 7 %
LYMPHOCYTES NFR BLD AUTO: 9.6 %
MCH RBC QN AUTO: 26.2 PG (ref 27–31)
MCH RBC QN AUTO: 26.7 PG (ref 27–31)
MCHC RBC AUTO-ENTMCNC: 30.1 G/DL (ref 33–36)
MCHC RBC AUTO-ENTMCNC: 31.3 G/DL (ref 33–36)
MCV RBC AUTO: 85.2 FL (ref 80–94)
MCV RBC AUTO: 87.1 FL (ref 80–94)
MONOCYTES # BLD AUTO: 0.86 X10(3)/MCL (ref 0.1–1.3)
MONOCYTES # BLD AUTO: 0.88 X10(3)/MCL (ref 0.1–1.3)
MONOCYTES NFR BLD AUTO: 4.7 %
MONOCYTES NFR BLD AUTO: 5.9 %
NEUTROPHILS # BLD AUTO: 11.56 X10(3)/MCL (ref 2.1–9.2)
NEUTROPHILS # BLD AUTO: 15.5 X10(3)/MCL (ref 2.1–9.2)
NEUTROPHILS NFR BLD AUTO: 79.8 %
NEUTROPHILS NFR BLD AUTO: 83.2 %
OHS QRS DURATION: 80 MS
OHS QTC CALCULATION: 500 MS
PLATELET # BLD AUTO: 328 X10(3)/MCL (ref 130–400)
PLATELET # BLD AUTO: 420 X10(3)/MCL (ref 130–400)
PMV BLD AUTO: 10.1 FL (ref 7.4–10.4)
PMV BLD AUTO: 10.7 FL (ref 7.4–10.4)
POCT GLUCOSE: 159 MG/DL (ref 70–110)
POCT GLUCOSE: 164 MG/DL (ref 70–110)
POTASSIUM SERPL-SCNC: 4 MMOL/L (ref 3.5–5.1)
POTASSIUM SERPL-SCNC: 4 MMOL/L (ref 3.5–5.1)
PROT SERPL-MCNC: 6.2 GM/DL (ref 5.8–7.6)
RBC # BLD AUTO: 2.94 X10(6)/MCL (ref 4.2–5.4)
RBC # BLD AUTO: 3.11 X10(6)/MCL (ref 4.2–5.4)
SODIUM SERPL-SCNC: 143 MMOL/L (ref 136–145)
SODIUM SERPL-SCNC: 145 MMOL/L (ref 136–145)
WBC # BLD AUTO: 14.48 X10(3)/MCL (ref 4.5–11.5)
WBC # BLD AUTO: 18.63 X10(3)/MCL (ref 4.5–11.5)

## 2024-08-09 PROCEDURE — 80053 COMPREHEN METABOLIC PANEL: CPT | Performed by: STUDENT IN AN ORGANIZED HEALTH CARE EDUCATION/TRAINING PROGRAM

## 2024-08-09 PROCEDURE — 25000242 PHARM REV CODE 250 ALT 637 W/ HCPCS: Performed by: EMERGENCY MEDICINE

## 2024-08-09 PROCEDURE — 94761 N-INVAS EAR/PLS OXIMETRY MLT: CPT

## 2024-08-09 PROCEDURE — 25000003 PHARM REV CODE 250: Performed by: EMERGENCY MEDICINE

## 2024-08-09 PROCEDURE — 85025 COMPLETE CBC W/AUTO DIFF WBC: CPT | Performed by: INTERNAL MEDICINE

## 2024-08-09 PROCEDURE — 63600175 PHARM REV CODE 636 W HCPCS: Performed by: EMERGENCY MEDICINE

## 2024-08-09 PROCEDURE — 27000221 HC OXYGEN, UP TO 24 HOURS

## 2024-08-09 PROCEDURE — 63600175 PHARM REV CODE 636 W HCPCS: Performed by: INTERNAL MEDICINE

## 2024-08-09 PROCEDURE — 99900035 HC TECH TIME PER 15 MIN (STAT)

## 2024-08-09 PROCEDURE — 96372 THER/PROPH/DIAG INJ SC/IM: CPT | Performed by: STUDENT IN AN ORGANIZED HEALTH CARE EDUCATION/TRAINING PROGRAM

## 2024-08-09 PROCEDURE — 80048 BASIC METABOLIC PNL TOTAL CA: CPT | Performed by: INTERNAL MEDICINE

## 2024-08-09 PROCEDURE — 25000003 PHARM REV CODE 250: Performed by: INTERNAL MEDICINE

## 2024-08-09 PROCEDURE — 94640 AIRWAY INHALATION TREATMENT: CPT

## 2024-08-09 PROCEDURE — 63600175 PHARM REV CODE 636 W HCPCS: Mod: JA | Performed by: INTERNAL MEDICINE

## 2024-08-09 PROCEDURE — A4216 STERILE WATER/SALINE, 10 ML: HCPCS | Performed by: EMERGENCY MEDICINE

## 2024-08-09 PROCEDURE — 85025 COMPLETE CBC W/AUTO DIFF WBC: CPT | Performed by: STUDENT IN AN ORGANIZED HEALTH CARE EDUCATION/TRAINING PROGRAM

## 2024-08-09 PROCEDURE — 36415 COLL VENOUS BLD VENIPUNCTURE: CPT | Performed by: INTERNAL MEDICINE

## 2024-08-09 PROCEDURE — 63600175 PHARM REV CODE 636 W HCPCS: Performed by: STUDENT IN AN ORGANIZED HEALTH CARE EDUCATION/TRAINING PROGRAM

## 2024-08-09 PROCEDURE — 99284 EMERGENCY DEPT VISIT MOD MDM: CPT | Mod: 25

## 2024-08-09 RX ORDER — HALOPERIDOL 5 MG/ML
2 INJECTION INTRAMUSCULAR
Status: COMPLETED | OUTPATIENT
Start: 2024-08-09 | End: 2024-08-09

## 2024-08-09 RX ORDER — ERTAPENEM 1 G/1
1 INJECTION, POWDER, LYOPHILIZED, FOR SOLUTION INTRAMUSCULAR; INTRAVENOUS DAILY
Start: 2024-08-09

## 2024-08-09 RX ORDER — DILTIAZEM HYDROCHLORIDE 180 MG/1
180 CAPSULE, COATED, EXTENDED RELEASE ORAL DAILY
Qty: 30 CAPSULE | Refills: 11 | Status: SHIPPED | OUTPATIENT
Start: 2024-08-09 | End: 2025-08-09

## 2024-08-09 RX ADMIN — MEROPENEM 1 G: 1 INJECTION, POWDER, FOR SOLUTION INTRAVENOUS at 06:08

## 2024-08-09 RX ADMIN — MEMANTINE HYDROCHLORIDE 10 MG: 5 TABLET ORAL at 09:08

## 2024-08-09 RX ADMIN — FAMOTIDINE 20 MG: 20 TABLET ORAL at 09:08

## 2024-08-09 RX ADMIN — SODIUM CHLORIDE, PRESERVATIVE FREE 10 ML: 5 INJECTION INTRAVENOUS at 12:08

## 2024-08-09 RX ADMIN — HYDROCODONE BITARTRATE AND ACETAMINOPHEN 1 TABLET: 5; 325 TABLET ORAL at 03:08

## 2024-08-09 RX ADMIN — MEROPENEM 1 G: 1 INJECTION, POWDER, FOR SOLUTION INTRAVENOUS at 01:08

## 2024-08-09 RX ADMIN — DILTIAZEM HYDROCHLORIDE 60 MG: 60 TABLET, FILM COATED ORAL at 06:08

## 2024-08-09 RX ADMIN — APIXABAN 5 MG: 5 TABLET, FILM COATED ORAL at 09:08

## 2024-08-09 RX ADMIN — CHOLESTYRAMINE POWDER FOR SUSPENSION 4 G: 4 POWDER, FOR SUSPENSION ORAL at 09:08

## 2024-08-09 RX ADMIN — INSULIN ASPART 2 UNITS: 100 INJECTION, SOLUTION INTRAVENOUS; SUBCUTANEOUS at 11:08

## 2024-08-09 RX ADMIN — IPRATROPIUM BROMIDE AND ALBUTEROL SULFATE 3 ML: .5; 3 SOLUTION RESPIRATORY (INHALATION) at 07:08

## 2024-08-09 RX ADMIN — SODIUM CHLORIDE, PRESERVATIVE FREE 10 ML: 5 INJECTION INTRAVENOUS at 06:08

## 2024-08-09 RX ADMIN — DONEPEZIL HYDROCHLORIDE 10 MG: 5 TABLET, FILM COATED ORAL at 09:08

## 2024-08-09 RX ADMIN — HYDROCORTISONE 10 MG: 5 TABLET ORAL at 09:08

## 2024-08-09 RX ADMIN — DILTIAZEM HYDROCHLORIDE 60 MG: 60 TABLET, FILM COATED ORAL at 01:08

## 2024-08-09 RX ADMIN — CITALOPRAM HYDROBROMIDE 30 MG: 20 TABLET ORAL at 09:08

## 2024-08-09 RX ADMIN — HYDROCODONE BITARTRATE AND ACETAMINOPHEN 1 TABLET: 5; 325 TABLET ORAL at 09:08

## 2024-08-09 RX ADMIN — HALOPERIDOL LACTATE 2 MG: 5 INJECTION, SOLUTION INTRAMUSCULAR at 04:08

## 2024-08-09 RX ADMIN — IPRATROPIUM BROMIDE AND ALBUTEROL SULFATE 3 ML: .5; 3 SOLUTION RESPIRATORY (INHALATION) at 12:08

## 2024-08-09 RX ADMIN — OCTREOTIDE ACETATE 50 MCG/HR: 500 INJECTION, SOLUTION INTRAVENOUS; SUBCUTANEOUS at 10:08

## 2024-08-09 NOTE — PLAN OF CARE
Physician ordered to discharge patient back to [facility]. Monique Valle was notified per phone/fax of pt's discharge. Nursing advised to call report to Wanda @ [phone].

## 2024-08-09 NOTE — ED PROVIDER NOTES
Encounter Date: 8/9/2024       History   No chief complaint on file.    HPI    73-year-old female with a past medical history of CAD, depression, hypertension and a CVA who is currently bed-bound from nursing home presents emergency department less than an hour after being discharged from the hospital.  Nursing home states that all she is doing his yelling and that is not normally her baseline.  Patient states her back hurts.    I will speak to the hospitalist in regards to patient's mental status to see if this is changed from her baseline or not.    Review of patient's allergies indicates:   Allergen Reactions    Adhesive tape-silicones Blisters    Chlorpheniramine-pseudoephed Other (See Comments)    Codeine Itching    Morphine Itching     Other reaction(s): Unknown    Statins-hmg-coa reductase inhibitors Nausea Only and Rash     Other reaction(s): muscle weakness     Past Medical History:   Diagnosis Date    Coronary artery disease     Depression     Hypertension     Stroke      Past Surgical History:   Procedure Laterality Date    CORONARY ARTERY BYPASS GRAFT       No family history on file.  Social History     Tobacco Use    Smoking status: Never    Smokeless tobacco: Never   Substance Use Topics    Alcohol use: Not Currently    Drug use: Never     Review of Systems   Unable to perform ROS: Mental status change       Physical Exam     Initial Vitals   BP Pulse Resp Temp SpO2   08/09/24 1541 08/09/24 1541 08/09/24 1541 08/09/24 1541 08/09/24 1545   (!) 137/91 81 (!) 24 97.9 °F (36.6 °C) 100 %      MAP       --                Physical Exam    Nursing note and vitals reviewed.  Constitutional: She appears well-developed and well-nourished. She appears distressed.   Yelling   Cardiovascular:            Irregularly irregular   Pulmonary/Chest: Breath sounds normal. No respiratory distress. She has no wheezes. She has no rhonchi. She has no rales.   Abdominal: Abdomen is soft. There is no abdominal tenderness. There  is no rebound and no guarding.   Musculoskeletal:         General: Edema (anasarca) present. No tenderness. Normal range of motion.     Neurological: She is alert.   Confused   Skin: Skin is warm. Capillary refill takes less than 2 seconds.         ED Course   Procedures  Labs Reviewed   CBC WITH DIFFERENTIAL - Abnormal       Result Value    WBC 18.63 (*)     RBC 3.11 (*)     Hgb 8.3 (*)     Hct 26.5 (*)     MCV 85.2      MCH 26.7 (*)     MCHC 31.3 (*)     RDW 16.7      Platelet 420 (*)     MPV 10.1      Neut % 83.2      Lymph % 7.0      Mono % 4.7      Eos % 1.9      Basophil % 0.3      Lymph # 1.30      Neut # 15.50 (*)     Mono # 0.88      Eos # 0.35      Baso # 0.06      IG# 0.54 (*)     IG% 2.9     CBC W/ AUTO DIFFERENTIAL    Narrative:     The following orders were created for panel order CBC auto differential.  Procedure                               Abnormality         Status                     ---------                               -----------         ------                     CBC with Differential[[id number]]       Abnormal            Final result                 Please view results for these tests on the individual orders.   COMPREHENSIVE METABOLIC PANEL          Imaging Results    None          Medications   haloperidol lactate injection 2 mg (has no administration in time range)     Medical Decision Making  Initial Assessment:   Medical screening exam  Differential Diagnosis:   Judging by the patient's chief complaint and pertinent history, the patient has the following possible differential diagnoses, including but not limited to the following.  Some of these are deemed to be lower likelihood and some more likely based on my physical exam and history combined with possible lab work and/or imaging studies.   Please see the pertinent studies, and refer to the HPI.  Some of these diagnoses will take further evaluation to fully rule out, perhaps as an outpatient and the patient was encouraged to follow  up when discharged for more comprehensive evaluation.  Altered mental status, UTI, chronic back pain, dementia,  as well as multiple other possible etiologies      Problems Addressed:  Anasarca: chronic illness or injury  Declining functional status: chronic illness or injury  Encounter for nursing home hospice care: acute illness or injury    Amount and/or Complexity of Data Reviewed  Labs: ordered.    Risk  Decision not to resuscitate or to de-escalate care because of poor prognosis.  Diagnosis or treatment significantly limited by social determinants of health.               ED Course as of 08/09/24 1626   Fri Aug 09, 2024   1549 Fortunately the hospitalist at discharge the patient was able to come down to the emergency room to evaluate the patient to see if she had any change in mental status from the last few days in the hospital and discharge.  Dr. Rock, the hospitalist, states that this is exactly how she has been for last few days.  States that she was a very for prognosis and thinks that she had actually would need hospice.  He states that we need to get some basic labs and he will likely readmit her for hospice care. [BS]   1619 Hospitalist talked to the son.  They agreeance for her to go to hospice.  Hospice set up at the nursing home with [facility]. [BS]      ED Course User Index  [BS] Silverio Chopra MD                           Clinical Impression:  Final diagnoses:  [Z51.5] Encounter for nursing home hospice care (Primary)  [R53.81] Declining functional status  [R60.1] Anasarca          ED Disposition Condition    Discharge to Nursing Home Stable          ED Prescriptions       Medication Sig Dispense Start Date End Date Auth. Provider    diltiaZEM (CARDIZEM CD) 180 MG 24 hr capsule Take 1 capsule (180 mg total) by mouth once daily. 30 capsule 8/9/2024 8/9/2025 Jad Rock MD          Follow-up Information       Follow up With Specialties Details Why Contact Chano Vides  Joseph CRUZ MD Family Medicine Schedule an appointment as soon as possible for a visit in 1 day  [address]   [place] [address]  [phone]               Silverio Chopra MD  08/09/24 0039

## 2024-08-09 NOTE — PLAN OF CARE
Problem: Adult Inpatient Plan of Care  Goal: Plan of Care Review  Outcome: Met  Goal: Patient-Specific Goal (Individualized)  Outcome: Met  Goal: Absence of Hospital-Acquired Illness or Injury  Outcome: Met  Goal: Optimal Comfort and Wellbeing  Outcome: Met  Goal: Readiness for Transition of Care  Outcome: Met     Problem: Diabetes Comorbidity  Goal: Blood Glucose Level Within Targeted Range  Outcome: Met     Problem: Acute Kidney Injury/Impairment  Goal: Fluid and Electrolyte Balance  Outcome: Met  Goal: Improved Oral Intake  Outcome: Met  Goal: Effective Renal Function  Outcome: Met     Problem: Infection  Goal: Absence of Infection Signs and Symptoms  Outcome: Met  Intervention: Prevent or Manage Infection  Flowsheets (Taken 8/9/2024 1205)  Fever Reduction/Comfort Measures:   lightweight bedding   lightweight clothing  Infection Management: aseptic technique maintained  Isolation Precautions:   precautions maintained   contact     Problem: Fall Injury Risk  Goal: Absence of Fall and Fall-Related Injury  Outcome: Met     Problem: Skin Injury Risk Increased  Goal: Skin Health and Integrity  Outcome: Met     Problem: Comorbidity Management  Goal: Blood Pressure in Desired Range  Outcome: Met     Problem: UTI (Urinary Tract Infection)  Goal: Improved Infection Symptoms  Outcome: Met  Intervention: Prevent Infection Progression  Flowsheets (Taken 8/9/2024 1205)  Fever Reduction/Comfort Measures:   lightweight bedding   lightweight clothing  Infection Management: aseptic technique maintained     Problem: Pneumonia  Goal: Fluid Balance  Outcome: Met  Goal: Resolution of Infection Signs and Symptoms  Outcome: Met  Goal: Effective Oxygenation and Ventilation  Outcome: Met     Problem: Gas Exchange Impaired  Goal: Optimal Gas Exchange  Outcome: Met  Intervention: Optimize Oxygenation and Ventilation  Flowsheets (Taken 8/9/2024 1205)  Airway/Ventilation Management: airway patency maintained  Head of Bed (HOB) Positioning:  HOB at 30-45 degrees     Problem: Functional Deficit  Goal: Improved Balance and Postural Control  Outcome: Met  Goal: Optimal Cognitive Function  Outcome: Met  Goal: Optimal Coordination  Outcome: Met  Goal: Improved Muscle Strength  Outcome: Met  Goal: Improved Muscle Tone  Outcome: Met  Goal: Optimal Range of Motion  Outcome: Met  Goal: Compensation for Sensory Deficit  Outcome: Met     Problem: Dysrhythmia  Goal: Normalized Cardiac Rhythm  Outcome: Met

## 2024-08-09 NOTE — PLAN OF CARE
8/9/24 d/c back to [facility] today. D/c info sent to them. Nurse will call report and d/c pt by GISELA.  She is bedbound, trip to be billed to her insurance.

## 2024-08-09 NOTE — PLAN OF CARE
08/09/24 1633   Discharge Assessment   Assessment Type Discharge Planning Brief Assessment   Source of Information family;health record   People in Home facility resident   Facility Arrived From: Resident of [facility]   Do you expect to return to your current living situation? Yes   Prior to hospitilization cognitive status: Not Oriented to Time;Not Oriented to Place;Not Oriented to Person   Current cognitive status: Not Oriented to Person;Not Oriented to Place;Not Oriented to Time   Equipment Currently Used at Home none   Are you on dialysis? No   Do you take coumadin? No   Discharge Plan A Hospice/home   Discharge Plan B Hospital at home   DME Needed Upon Discharge  none   Discharge Plan discussed with: Adult children   Transition of Care Barriers None   Physical Activity   On average, how many days per week do you engage in moderate to strenuous exercise (like a brisk walk)? Pt Declined   On average, how many minutes do you engage in exercise at this level? Pt Declined   Financial Resource Strain   How hard is it for you to pay for the very basics like food, housing, medical care, and heating? Pt Declined   Housing Stability   In the last 12 months, was there a time when you were not able to pay the mortgage or rent on time? Pt Declined   At any time in the past 12 months, were you homeless or living in a shelter (including now)? Pt Declined   Transportation Needs   Has the lack of transportation kept you from medical appointments, meetings, work or from getting things needed for daily living? Patient declined   Food Insecurity   Within the past 12 months, you worried that your food would run out before you got the money to buy more. Pt Declined   Within the past 12 months, the food you bought just didn't last and you didn't have money to get more. Pt Declined   Stress   Do you feel stress - tense, restless, nervous, or anxious, or unable to sleep at night because your mind is troubled all the time - these  days? Pt Declined   Social Isolation   How often do you feel lonely or isolated from those around you?  Patient declined   Alcohol Use   Q1: How often do you have a drink containing alcohol? Pt Declined   Q2: How many drinks containing alcohol do you have on a typical day when you are drinking? Pt Declined   Q3: How often do you have six or more drinks on one occasion? Pt Declined   Utilities   In the past 12 months has the electric, gas, oil, or water company threatened to shut off services in your home? Pt Declined   Health Literacy   How often do you need to have someone help you when you read instructions, pamphlets, or other written material from your doctor or pharmacy? Patient declines to respond     Spoke to Dr. Rock. He tells me that pt is in the ED.  Pt was just d/c'd this afternoon about an hour ago.  NH sent pt here stating she was unresponsive.  Pt awake in ED, yelling, per Dr. Rock. Dr. Rock asked that I call [facility] and see if they want hospice care at this time.  Pt ill with infection and has been bedridden for some years at [facility].  Called son Alexis on his cell, he answered.  Spoke to him and informed him of situation and he said yes, he wants hospice for his mother. I asked which one he wants to speak to and he does not have any preference.  I told him to expect a call in about 15 minutes.  Called Sabrina with [facility].  Gave referral info on phone to her with son's cell number [phone].  Sabrina stated that pt does have hospice appropriate diagnoses and that she will call him now.  Sent referral via fax to [facility]  Called Cary at [facility] to let her know pt returning and will be admitted to hospice services.  She said financially, patient is good to return today.  She stated that she will let nurse know pt is coming back to today, from ED.  Informed Dr. Rock of the information and he is calling ED MD to update him and have him d/c pt back to [facility].  Order for  hospice sent to [facility] along with H&P, d/c AVS from today and milagros and son Alexis's cell phone number.  Messaged Sabrina with [facility] that pt is d/c back to [facility] today from the ED.  Informed house supervisor.

## 2024-08-09 NOTE — PLAN OF CARE
08/09/24 1047   Final Note   Assessment Type Final Discharge Note   Anticipated Discharge Disposition [facility]   Post-Acute Status   Discharge Delays None known at this time     Pt d/c back to [facility]. NH to complete 7 days of Ertepenem  Initial dose 8/5/24.  [facility].  JE Gallagher sent the d/c info.

## 2024-08-09 NOTE — PROGRESS NOTES
[facility] Medicine  Progress Note    Patient Name: Thu Branch  MRN: [id number]  Patient Class: IP- Inpatient   Admission Date: 8/2/2024  Length of Stay: 7 days  Attending Physician: Ernesto Mehta MD  Primary Care Provider: Joseph Vides MD        Subjective:     Principal Problem:Acute kidney injury    Interval History:   HPI: Ms. Branch is a 73-year-old female, NH patient was transferred to the ED earlier today due to staff noting change in her mental status as well as hypoxia as noted on room air saturations.  Her reported room air saturation there was 88%.  In the ED while on RA O2 saturation noted to fall into the mid 80s.  With 2 L NC O2 saturations improved into the mid-to-high 90s.  Evaluation included CT head which was unremarkable for acute changes, CXR was thought to show RLL infiltrate and routine lab work which revealed leukocytosis and elevated renal indices consistent with GADIEL.  While in the ED was also noted to have 2 episodes of diarrhea following which a rectal tube was inserted.  Urinalysis was ordered but not collected in the ED. urinary catheter was placed following the patient's arrival to the /surge unit.  On my seeing her was awake and answered questions but did not initiate conversation.  Denied feeling short of breath, denied pain.  She did not know why she was sent to the emergency department.    8/3-when seen on rounds today appeared comfortable; she was very sleepy, awakened to light touch and would answer questions with 1 or 2 word answers but quickly fall back to sleep when not stimulated; continues to have watery stools    8/4-she was much more alert and interactive when seen on rounds today; mental status is back to her baseline; she had no complaints    8/5-there were no issues overnight; condition remained stable; she had no complaints when seen on rounds today; she remains in AFib with CVR    August 6,  2024-complain of large amount of bile colored diarrhea through rectal tube, no nausea, no vomiting, no abdominal pain    August 7, 2024-complain of diarrhea and abdominal pain, hemoglobin dropped to 6.8 from 9.1, no blood in the stool    August 8, 2024-complain of loose stool, no fever, no shortness for breath, no nausea, hemoglobin improved to 8.1 from 6.8 after 1 unit of packed RBC yesterday    August 9, 2024-diarrhea is improved with the octreotide, no nausea no vomiting, no abdominal pain    Patient can be transferred back to nursing home today      set up a ertapenem 1 g IM q.d. for 10 days at the nursing home  Objective:     Vital Signs (Most Recent):  Temp: 97.6 °F (36.4 °C) (08/09/24 0737)  Pulse: 106 (08/09/24 0737)  Resp: 20 (08/09/24 0954)  BP: (!) 155/68 (08/09/24 0737)  SpO2: 100 % (08/09/24 0737) Vital Signs (24h Range):  Temp:  [97.4 °F (36.3 °C)-98.3 °F (36.8 °C)] 97.6 °F (36.4 °C)  Pulse:  [] 106  Resp:  [16-22] 20  SpO2:  [87 %-100 %] 100 %  BP: (109-184)/() 155/68     Weight: 96.5 kg (212 lb 11.9 oz)  Body mass index is 38.91 kg/m².    Intake/Output Summary (Last 24 hours) at 8/9/2024 1013  Last data filed at 8/9/2024 0818  Gross per 24 hour   Intake 60 ml   Output 1500 ml   Net -1440 ml      Physical exam  Constitution-obese, normally developed female in NAD  Eyes-PERRL, EOMI  HENT-normocephalic, atraumatic  Neck-supple  Respiratory-normal respirations; CTA with good air movement  Heart-irregularly irregular rhythm, normal rate; normal S1 and S2; no murmurs, gallops  Abdomen-soft, nontender, nondistended  Genitourinary-deferred  Musculoskeletal-no joint abnormalities, normal ROM throughout  Skin-warm, dry; no rashes  Neurologic-alert, oriented x3; she is essentially functionally quadriplegic    Scheduled Meds:   albuterol-ipratropium  3 mL Nebulization Q6H    apixaban  5 mg Oral BID    cholestyramine  1 packet Oral BID    citalopram  30 mg Oral Daily    diltiaZEM  60 mg  "Oral Q8H    donepeziL  10 mg Oral BID    famotidine  20 mg Oral Daily    hydrocortisone  10 mg Oral BID    memantine  10 mg Oral BID    meropenem IV (PEDS and ADULTS)  1 g Intravenous Q8H    sodium chloride 0.9%  10 mL Intravenous Q6H     Continuous Infusions:   lactated ringers   Intravenous Continuous 50 mL/hr at 08/08/24 0507 New Bag at 08/08/24 0507    octreotide (SANDOSTATIN) 500 mcg in 0.9% NaCl 100 mL infusion  50 mcg/hr Intravenous Continuous 10 mL/hr at 08/09/24 1000 50 mcg/hr at 08/09/24 1000     PRN Meds:.  Current Facility-Administered Medications:     0.9%  NaCl infusion (for blood administration), , Intravenous, Q24H PRN    acetaminophen, 650 mg, Oral, Q4H PRN    acetaminophen, 650 mg, Oral, Q8H PRN    ALPRAZolam, 0.5 mg, Oral, TID PRN    bismuth subsalicylate, 30 mL, Oral, Q6H PRN    dextrose 10%, 12.5 g, Intravenous, PRN    dextrose 10%, 25 g, Intravenous, PRN    diphenoxylate-atropine 2.5-0.025 mg, 1 tablet, Oral, QID PRN    glucagon (human recombinant), 1 mg, Intramuscular, PRN    glucose, 16 g, Oral, PRN    glucose, 24 g, Oral, PRN    HYDROcodone-acetaminophen, 1 tablet, Oral, Q4H PRN    HYDROmorphone, 1 mg, Intravenous, Q6H PRN    insulin aspart U-100, 0-10 Units, Subcutaneous, QID (AC + HS) PRN    metoprolol, 5 mg, Intravenous, Q2H PRN    naloxone, 0.02 mg, Intravenous, PRN    sodium chloride 0.9%, 10 mL, Intravenous, Q12H PRN    Flushing PICC/Midline Protocol, , , Until Discontinued **AND** sodium chloride 0.9%, 10 mL, Intravenous, Q6H **AND** sodium chloride 0.9%, 10 mL, Intravenous, PRN    ziprasidone, 20 mg, Intramuscular, Q12H PRN    Significant Labs: All pertinent labs within the past 24 hours have been reviewed.  Blood Culture: No results for input(s): "LABBLOO" in the last 48 hours.  CBC:   Recent Labs   Lab 08/08/24  0535 08/09/24  0340   WBC 14.55* 14.48*   HGB 8.1* 7.7*   HCT 26.0* 25.6*    328       CMP:   Recent Labs   Lab 08/08/24  0535 08/09/24  0340    143   K 3.3* " "4.0   * 113*   CO2 25 22*   BUN 21.0* 14.0   CREATININE 0.71 0.67   CALCIUM 9.1 8.9     Magnesium:   No results for input(s): "MG" in the last 48 hours.    POCT Glucose:   Recent Labs   Lab 08/08/24  1643 08/08/24  2139 08/09/24  0618   POCTGLUCOSE 169* 121* 159*     Urine Culture:   No results for input(s): "LABURIN" in the last 48 hours.    Urine Studies:   No results for input(s): "COLORU", "APPEARANCEUA", "PHUR", "SPECGRAV", "PROTEINUA", "GLUCUA", "KETONESU", "BILIRUBINUA", "OCCULTUA", "NITRITE", "UROBILINOGEN", "LEUKOCYTESUR", "RBCUA", "WBCUA", "BACTERIA", "SQUAMEPITHEL", "HYALINECASTS" in the last 48 hours.    Invalid input(s): "WRIGHTSUR"      Significant Imaging:   CXR  Impression:  1. Mild cardiomegaly  2. Atherosclerosis  3. Atelectasis and/or scarring left lower lung    Assessment/Plan:     Discharge diagnosis  Metabolic encephalopathy  Multifactorial; patient had a UTI, possible pneumonia, volume depletion and GADIEL all of which contributed to altered mental status  Resolved; mental status at baseline     Acute kidney injury/CKD, stage IIIB  Likely due to volume depletion; exacerbated by ARB  Renal indices normalizing  Avoid nephrotoxic agents/renal dose medications  Discontinue urinary catheter  Continue IV hydration     UTI  Urine culture-ESBL+ Klebsiella pneumoniae; sensitivity pattern different than organism in blood; Anticipate  Continue IV meropenem   discharge  on ertapenem 1 g IM daily 7 days to complete 10 days therapy    Gram-negative (Klebsiella) sepsis  BC's 2/2 growing ESBL+ Klebsiella pneumoniae; sensitivities noted  WBC now nl  As noted above patient will receive ertapenem dose today with plans to discharge on IM ertapenem     Possible RLL pneumonia/hypoxemia  Bronchodilator therapy, supplemental O2 as needed as patient hypoxic on room air  O2 saturation on 3 L %  Continue current antibiotics  Procalcitonin 25.5, markedly elevated     Diarrhea  Etiology: Infectious versus " iatrogenic (patient was on multiple laxative type medications as well as Linzess); these were discontinued  C diff studies was negative  GI panel negative  Diarrhea persists  Add Questran and banana flakes; give single   Continue Pepto-Bismol and Lomotil  Continue rectal tube until stools become formed  Try octreotide       AFib with RVR  Patient developed AFib with RVR during the day on 8/4 and responded with good rate control to IV diltiazem; was maintained on diltiazem drip overnight but was discontinued early this morning due to bradycardia (rates dropped into the 50s)  She remains in AFib; she has maintained good blood pressures  Echo, 03/08/2024-EF 65-70% with grade 1 DD; normal left atrial size  Patient started on oral diltiazem 60 mg q.8 hours this morning; can be converted to diltiazem  mg daily at discharge  CHADS-VASc 5-OAC indicated; hold apixaban 5 mg b.i.d. secondary to anemia     Diabetes mellitus  Monitor blood sugars with Accu-Cheks and SSI  Add basal insulin as necessary     Hypertension  Blood pressure rising  Cont amlodipine 5 mg daily  Consider resuming ARB at discharge at decreased dose as diltiazem has been added to regimen    Hypokalemia  Supplement as necessary     Patient on chronic hydrocortisone  Taper dose back to 5 mg daily dosing at discharge    Extreme debility  Patient is bed ridden, requires total care, functionally quadriplegic  BSE revealed minimal aspiration; small bites with gravy and thin liquids recommended  The patient is on DNR comfort measures only,   patient is from nursing     Anemia-status post 1 unit of packed RBC August 7, 2024      GI prophylaxis: Famotidine     Code status: DNR     Discharge condition-stable     Discharge destination-nursing home-discharge time 30 minutes  Active Diagnoses:    Diagnosis Date Noted POA    PRINCIPAL PROBLEM:  Acute kidney injury [N17.9] 05/12/2024 Yes      Problems Resolved During this Admission:     VTE Risk Mitigation (From  admission, onward)           Ordered     apixaban tablet 5 mg  2 times daily         08/05/24 0753     IP VTE HIGH RISK PATIENT  Once         08/02/24 1789                       Jad Rock MD  Department of Hospital Medicine   [facility]

## 2024-08-10 NOTE — DISCHARGE SUMMARY
[facility]  Hospital Medicine  Discharge summary    Patient Name: Thu Branch  MRN: [id number]  Patient Class: IP- Inpatient   Admission Date: 8/2/2024  Length of Stay: 7 days  Attending Physician: No att. providers found  Primary Care Provider: Joseph Vides MD        Subjective:     Principal Problem:Acute kidney injury    Interval History:   HPI: Ms. Branch is a 73-year-old female, NH patient was transferred to the ED earlier today due to staff noting change in her mental status as well as hypoxia as noted on room air saturations.  Her reported room air saturation there was 88%.  In the ED while on RA O2 saturation noted to fall into the mid 80s.  With 2 L NC O2 saturations improved into the mid-to-high 90s.  Evaluation included CT head which was unremarkable for acute changes, CXR was thought to show RLL infiltrate and routine lab work which revealed leukocytosis and elevated renal indices consistent with GADIEL.  While in the ED was also noted to have 2 episodes of diarrhea following which a rectal tube was inserted.  Urinalysis was ordered but not collected in the ED. urinary catheter was placed following the patient's arrival to the /surge unit.  On my seeing her was awake and answered questions but did not initiate conversation.  Denied feeling short of breath, denied pain.  She did not know why she was sent to the emergency department.    8/3-when seen on rounds today appeared comfortable; she was very sleepy, awakened to light touch and would answer questions with 1 or 2 word answers but quickly fall back to sleep when not stimulated; continues to have watery stools    8/4-she was much more alert and interactive when seen on rounds today; mental status is back to her baseline; she had no complaints    8/5-there were no issues overnight; condition remained stable; she had no complaints when seen on rounds today; she remains in AFib with CVR    August 6,  2024-complain of large amount of bile colored diarrhea through rectal tube, no nausea, no vomiting, no abdominal pain    August 7, 2024-complain of diarrhea and abdominal pain, hemoglobin dropped to 6.8 from 9.1, no blood in the stool    August 8, 2024-complain of loose stool, no fever, no shortness for breath, no nausea, hemoglobin improved to 8.1 from 6.8 after 1 unit of packed RBC yesterday    August 9, 2024-diarrhea is improved with the octreotide, no nausea no vomiting, no abdominal pain    Patient can be transferred back to nursing home today      set up a ertapenem 1 g IM q.d. for 10 days at the nursing home  Objective:     Vital Signs (Most Recent):  Temp: 97.6 °F (36.4 °C) (08/09/24 1240)  Pulse: 107 (08/09/24 1240)  Resp: (!) 28 (08/09/24 1240)  BP: (!) 154/92 (08/09/24 1240)  SpO2: 96 % (08/09/24 1240) Vital Signs (24h Range):  Temp:  [97.6 °F (36.4 °C)-97.9 °F (36.6 °C)] 97.9 °F (36.6 °C)  Pulse:  [] 96  Resp:  [18-28] 18  SpO2:  [96 %-100 %] 98 %  BP: (137-154)/(84-92) 144/84     Weight: 96.5 kg (212 lb 11.9 oz)  Body mass index is 38.91 kg/m².    Intake/Output Summary (Last 24 hours) at 8/10/2024 1045  Last data filed at 8/9/2024 1305  Gross per 24 hour   Intake 60 ml   Output 425 ml   Net -365 ml      Physical exam  Constitution-obese, normally developed female in NAD  Eyes-PERRL, EOMI  HENT-normocephalic, atraumatic  Neck-supple  Respiratory-normal respirations; CTA with good air movement  Heart-irregularly irregular rhythm, normal rate; normal S1 and S2; no murmurs, gallops  Abdomen-soft, nontender, nondistended  Genitourinary-deferred  Musculoskeletal-no joint abnormalities, normal ROM throughout  Skin-warm, dry; no rashes  Neurologic-alert, oriented x3; she is essentially functionally quadriplegic    Scheduled Meds:      Continuous Infusions:      PRN Meds:.    Significant Labs: All pertinent labs within the past 24 hours have been reviewed.  Blood Culture: No results for  "input(s): "LABBLOO" in the last 48 hours.  CBC:   Recent Labs   Lab 08/09/24  0340 08/09/24  1616   WBC 14.48* 18.63*   HGB 7.7* 8.3*   HCT 25.6* 26.5*    420*       CMP:   Recent Labs   Lab 08/09/24  0340 08/09/24  1616    145   K 4.0 4.0   * 112*   CO2 22* 26   BUN 14.0 12.0   CREATININE 0.67 0.67   CALCIUM 8.9 9.1   ALBUMIN  --  2.7*   BILITOT  --  0.3   ALKPHOS  --  53   AST  --  24   ALT  --  56*     Magnesium:   No results for input(s): "MG" in the last 48 hours.    POCT Glucose:   Recent Labs   Lab 08/08/24  2139 08/09/24  0618 08/09/24  1111   POCTGLUCOSE 121* 159* 164*     Urine Culture:   No results for input(s): "LABURIN" in the last 48 hours.    Urine Studies:   No results for input(s): "COLORU", "APPEARANCEUA", "PHUR", "SPECGRAV", "PROTEINUA", "GLUCUA", "KETONESU", "BILIRUBINUA", "OCCULTUA", "NITRITE", "UROBILINOGEN", "LEUKOCYTESUR", "RBCUA", "WBCUA", "BACTERIA", "SQUAMEPITHEL", "HYALINECASTS" in the last 48 hours.    Invalid input(s): "WRIGHTSUR"      Significant Imaging:   CXR  Impression:  1. Mild cardiomegaly  2. Atherosclerosis  3. Atelectasis and/or scarring left lower lung    Assessment/Plan:     Discharge diagnosis  Metabolic encephalopathy  Multifactorial; patient had a UTI, possible pneumonia, volume depletion and GADIEL all of which contributed to altered mental status  Resolved; mental status at baseline     Acute kidney injury/CKD, stage IIIB  Likely due to volume depletion; exacerbated by ARB  Renal indices normalizing  Avoid nephrotoxic agents/renal dose medications  Discontinue urinary catheter  Continue IV hydration     UTI  Urine culture-ESBL+ Klebsiella pneumoniae; sensitivity pattern different than organism in blood; Anticipate  Continue IV meropenem   discharge  on ertapenem 1 g IM daily 7 days to complete 10 days therapy    Gram-negative (Klebsiella) sepsis  BC's 2/2 growing ESBL+ Klebsiella pneumoniae; sensitivities noted  WBC now nl  As noted above patient will " receive ertapenem dose today with plans to discharge on IM ertapenem     Possible RLL pneumonia/hypoxemia  Bronchodilator therapy, supplemental O2 as needed as patient hypoxic on room air  O2 saturation on 3 L %  Continue current antibiotics  Procalcitonin 25.5, markedly elevated     Diarrhea  Etiology: Infectious versus iatrogenic (patient was on multiple laxative type medications as well as Linzess); these were discontinued  C diff studies was negative  GI panel negative  Diarrhea persists  Add Questran and banana flakes; give single   Continue Pepto-Bismol and Lomotil  Continue rectal tube until stools become formed  Try octreotide       AFib with RVR  Patient developed AFib with RVR during the day on 8/4 and responded with good rate control to IV diltiazem; was maintained on diltiazem drip overnight but was discontinued early this morning due to bradycardia (rates dropped into the 50s)  She remains in AFib; she has maintained good blood pressures  Echo, 03/08/2024-EF 65-70% with grade 1 DD; normal left atrial size  Patient started on oral diltiazem 60 mg q.8 hours this morning; can be converted to diltiazem  mg daily at discharge  CHADS-VASc 5-OAC indicated; hold apixaban 5 mg b.i.d. secondary to anemia     Diabetes mellitus  Monitor blood sugars with Accu-Cheks and SSI  Add basal insulin as necessary     Hypertension  Blood pressure rising  Cont amlodipine 5 mg daily  Consider resuming ARB at discharge at decreased dose as diltiazem has been added to regimen    Hypokalemia  Supplement as necessary     Patient on chronic hydrocortisone  Taper dose back to 5 mg daily dosing at discharge    Extreme debility  Patient is bed ridden, requires total care, functionally quadriplegic  BSE revealed minimal aspiration; small bites with gravy and thin liquids recommended  The patient is on DNR comfort measures only,   patient is from nursing     Anemia-status post 1 unit of packed RBC August 7, 2024      GI  prophylaxis: Famotidine     Code status: DNR     Discharge condition-stable     Discharge destination-nursing home-discharge time 30 minutes  Active Diagnoses:    Diagnosis Date Noted POA    PRINCIPAL PROBLEM:  Acute kidney injury [N17.9] 05/12/2024 Yes      Problems Resolved During this Admission:     VTE Risk Mitigation (From admission, onward)           Ordered     IP VTE HIGH RISK PATIENT  Once         08/02/24 0666                       Jad Rock MD  Department of Hospital Medicine   [facility]

## 2024-08-14 ENCOUNTER — PATIENT OUTREACH (OUTPATIENT)
Dept: ADMINISTRATIVE | Facility: CLINIC | Age: 74
End: 2024-08-14
Payer: MEDICARE